# Patient Record
Sex: FEMALE | Race: WHITE | NOT HISPANIC OR LATINO | Employment: OTHER | URBAN - NONMETROPOLITAN AREA
[De-identification: names, ages, dates, MRNs, and addresses within clinical notes are randomized per-mention and may not be internally consistent; named-entity substitution may affect disease eponyms.]

---

## 2017-01-27 DIAGNOSIS — I48.0 PAROXYSMAL ATRIAL FIBRILLATION (HCC): Primary | ICD-10-CM

## 2017-01-27 DIAGNOSIS — Z51.81 ENCOUNTER FOR MONITORING FLECAINIDE THERAPY: Primary | ICD-10-CM

## 2017-01-27 DIAGNOSIS — Z79.899 ENCOUNTER FOR MONITORING FLECAINIDE THERAPY: Primary | ICD-10-CM

## 2017-01-27 RX ORDER — FLECAINIDE ACETATE 50 MG/1
50 TABLET ORAL 2 TIMES DAILY
Qty: 60 TABLET | Refills: 11 | Status: SHIPPED | OUTPATIENT
Start: 2017-01-27 | End: 2018-01-26 | Stop reason: SDUPTHER

## 2017-02-03 DIAGNOSIS — I50.9 CONGESTIVE HEART FAILURE, UNSPECIFIED CONGESTIVE HEART FAILURE CHRONICITY, UNSPECIFIED CONGESTIVE HEART FAILURE TYPE: ICD-10-CM

## 2017-02-03 DIAGNOSIS — R06.02 SHORTNESS OF BREATH: ICD-10-CM

## 2017-02-03 RX ORDER — POTASSIUM CHLORIDE 750 MG/1
10 TABLET, FILM COATED, EXTENDED RELEASE ORAL DAILY
Qty: 30 TABLET | Refills: 4 | Status: SHIPPED | OUTPATIENT
Start: 2017-02-03 | End: 2018-01-26 | Stop reason: SDUPTHER

## 2017-02-10 ENCOUNTER — OFFICE VISIT (OUTPATIENT)
Dept: CARDIOLOGY | Facility: CLINIC | Age: 77
End: 2017-02-10

## 2017-02-10 VITALS
DIASTOLIC BLOOD PRESSURE: 77 MMHG | BODY MASS INDEX: 37 KG/M2 | WEIGHT: 196 LBS | HEART RATE: 60 BPM | SYSTOLIC BLOOD PRESSURE: 156 MMHG | HEIGHT: 61 IN | OXYGEN SATURATION: 96 %

## 2017-02-10 DIAGNOSIS — I10 ESSENTIAL HYPERTENSION: ICD-10-CM

## 2017-02-10 DIAGNOSIS — R06.02 SHORTNESS OF BREATH: ICD-10-CM

## 2017-02-10 DIAGNOSIS — I48.0 PAROXYSMAL ATRIAL FIBRILLATION (HCC): Primary | ICD-10-CM

## 2017-02-10 PROCEDURE — 99213 OFFICE O/P EST LOW 20 MIN: CPT | Performed by: PHYSICIAN ASSISTANT

## 2017-02-10 RX ORDER — OMEPRAZOLE 20 MG/1
20 CAPSULE, DELAYED RELEASE ORAL 2 TIMES DAILY
COMMUNITY
Start: 2017-02-02 | End: 2017-11-22

## 2017-02-10 NOTE — PROGRESS NOTES
Problem list     Subjective   Cari Harper is a 76 y.o. female     Chief Complaint   Patient presents with   • Follow-up     routine   Problem List:  1. Paroxysmal atrial fibrillation  2. Preserved systolic function  3. Low risk stress test with no evidence of ischemia.  4. Hypertension  5. Mild pulmonary hypertension, PA systolic pressures 40-45 mmHg  6. Dyslipidemia  7. Hypothyroidism       HPI  The patient presents back in today to discuss medication change.  Because of side effects associated with amiodarone, we discontinued that therapy.  We put her on flecainide therapy and followed her with daily EKGs ×3.  She reports that she feels better after discontinuing amiodarone therapy.  She seems to have less dyspnea.  She was experiencing a tremor while on amiodarone.  That appears markedly improved as well.  Recently, she has had no rhythm disturbance.  She reports no PND or orthopnea.  Her edema even seems better long-term.  She relates to no chest pain or other anginal equivalent symptoms otherwise.  She has no further complaints.    Current Outpatient Prescriptions   Medication Sig Dispense Refill   • apixaban (ELIQUIS) 5 MG tablet tablet Take 5 mg by mouth every evening.     • clonazePAM (KlonoPIN) 1 MG tablet Take  by mouth. Takes 0.5 mg am and 1 mg in pm     • ferrous sulfate 325 (65 FE) MG tablet Take 325 mg by mouth daily with breakfast.     • flecainide (TAMBOCOR) 50 MG tablet Take 1 tablet by mouth 2 (Two) Times a Day. 60 tablet 11   • FLUoxetine (PROzac) 20 MG capsule Take 20 mg by mouth 2 (two) times a day.     • furosemide (LASIX) 20 MG tablet Take 1 tablet by mouth daily. 90 tablet 3   • Omega-3 Fatty Acids (FISH OIL) 1000 MG capsule capsule Take 1,200 mg by mouth 2 (Two) Times a Day With Meals.     • omeprazole (priLOSEC) 20 MG capsule Take 20 mg by mouth 2 (Two) Times a Day.     • potassium chloride (K-DUR) 10 MEQ CR tablet Take 1 tablet by mouth Daily. 30 tablet 4   • Prenatal Vit-Fe Fumarate-FA  (PRENATAL VITAMIN 27-0.8) 27-0.8 MG tablet tablet Take  by mouth daily.     • thyroid (ARMOUR) 120 MG tablet Take 60 mg by mouth daily.     • ondansetron (ZOFRAN) 4 MG tablet Take 1 tablet PRN nausea every 4 hours. 5 tablet 0   • polyethylene glycol (MIRALAX) packet Take 255g of Miralax with 32 oz clear liquid at 8pm the night before the procedure. Repeat 255g Miralax 6 hrs prior to your procedure. 510 g 0   • Probiotic Product (PROBIOTIC ADVANCED PO) Take 1 tablet by mouth daily.       No current facility-administered medications for this visit.        Review of patient's allergies indicates no known allergies.    Past Medical History   Diagnosis Date   • Anxiety    • Arthritis    • Atrial fibrillation    • Coronary artery disease    • Disease of thyroid gland    • DVT (deep venous thrombosis)    • Hyperlipidemia    • Hypertension        Social History     Social History   • Marital status: Unknown     Spouse name: N/A   • Number of children: N/A   • Years of education: N/A     Occupational History   • Not on file.     Social History Main Topics   • Smoking status: Former Smoker     Packs/day: 1.00     Years: 0.00     Types: Cigarettes     Start date: 8/11/1961     Quit date: 8/11/1986   • Smokeless tobacco: Never Used   • Alcohol use No   • Drug use: No   • Sexual activity: Defer     Other Topics Concern   • Not on file     Social History Narrative       Family History   Problem Relation Age of Onset   • Cancer Father    • No Known Problems Mother        Review of Systems   Constitutional: Positive for fatigue.   HENT: Positive for ear pain (left ear).    Eyes: Positive for visual disturbance (reading glasses).   Respiratory: Positive for shortness of breath.    Cardiovascular: Positive for leg swelling. Negative for chest pain and palpitations.   Gastrointestinal: Negative.    Endocrine: Negative.    Genitourinary: Negative.    Musculoskeletal: Positive for arthralgias, joint swelling (rt. shoulder due to past  "fall) and myalgias.   Skin: Negative.    Allergic/Immunologic: Negative.    Neurological: Negative.    Hematological: Bruises/bleeds easily.   Psychiatric/Behavioral: Negative.        Objective   Visit Vitals   • /77 (BP Location: Left arm, Patient Position: Sitting)   • Pulse 60   • Ht 61\" (154.9 cm)   • Wt 196 lb (88.9 kg)   • SpO2 96%   • BMI 37.03 kg/m2     Lab Results (most recent)     None        Physical Exam   Constitutional: She is oriented to person, place, and time. She appears well-developed and well-nourished. No distress.   HENT:   Head: Normocephalic and atraumatic.   Eyes: Conjunctivae and EOM are normal. Pupils are equal, round, and reactive to light.   Neck: Normal range of motion. Neck supple. No JVD present. No tracheal deviation present.   Cardiovascular: Normal rate, regular rhythm, normal heart sounds and intact distal pulses.    Pulmonary/Chest: Effort normal. She has decreased breath sounds. She has no rales (Minimal bibasilar rales).   Abdominal: Soft. Bowel sounds are normal. She exhibits no distension and no mass. There is no tenderness. There is no rebound and no guarding.   Musculoskeletal: Normal range of motion. She exhibits edema. She exhibits no tenderness or deformity.   Neurological: She is alert and oriented to person, place, and time.   Skin: Skin is warm and dry. No rash noted. No erythema. No pallor.   Psychiatric: She has a normal mood and affect. Her behavior is normal. Judgment and thought content normal.   Nursing note and vitals reviewed.        Procedure   Procedures       Assessment/Plan      Diagnosis Plan   1. Paroxysmal atrial fibrillation     2. Essential hypertension     3. Shortness of breath       The patient is doing well at this time.  She feels improved on flecainide.  The majority of side effects experienced while on amiodarone seems to be improving while off that medication.  I will make no changes at this time.  Her cardiac workup is been very much " benign at this point.  She feels very much stable this time.  She will call to us for any issues.  Otherwise we'll see her back in 3 months.

## 2017-03-07 DIAGNOSIS — R60.9 EDEMA, UNSPECIFIED TYPE: Primary | ICD-10-CM

## 2017-03-07 RX ORDER — BUMETANIDE 2 MG/1
2 TABLET ORAL DAILY
Qty: 30 TABLET | Refills: 6 | Status: SHIPPED | OUTPATIENT
Start: 2017-03-07 | End: 2017-09-28 | Stop reason: SDUPTHER

## 2017-05-12 ENCOUNTER — OFFICE VISIT (OUTPATIENT)
Dept: CARDIOLOGY | Facility: CLINIC | Age: 77
End: 2017-05-12

## 2017-05-12 VITALS
OXYGEN SATURATION: 97 % | DIASTOLIC BLOOD PRESSURE: 58 MMHG | SYSTOLIC BLOOD PRESSURE: 120 MMHG | BODY MASS INDEX: 36.4 KG/M2 | HEART RATE: 51 BPM | WEIGHT: 192.8 LBS | HEIGHT: 61 IN

## 2017-05-12 DIAGNOSIS — R06.02 SHORTNESS OF BREATH: Primary | ICD-10-CM

## 2017-05-12 DIAGNOSIS — I48.0 PAROXYSMAL ATRIAL FIBRILLATION (HCC): ICD-10-CM

## 2017-05-12 PROCEDURE — 99213 OFFICE O/P EST LOW 20 MIN: CPT | Performed by: PHYSICIAN ASSISTANT

## 2017-06-06 DIAGNOSIS — R60.9 EDEMA, UNSPECIFIED TYPE: Primary | ICD-10-CM

## 2017-06-06 RX ORDER — METOLAZONE 2.5 MG/1
2.5 TABLET ORAL DAILY
Qty: 10 TABLET | Refills: 0 | Status: SHIPPED | OUTPATIENT
Start: 2017-06-06 | End: 2017-11-22

## 2017-09-28 DIAGNOSIS — R60.9 EDEMA, UNSPECIFIED TYPE: ICD-10-CM

## 2017-09-28 RX ORDER — BUMETANIDE 2 MG/1
TABLET ORAL
Qty: 30 TABLET | Refills: 3 | Status: SHIPPED | OUTPATIENT
Start: 2017-09-28 | End: 2017-11-22

## 2017-11-17 ENCOUNTER — OFFICE VISIT (OUTPATIENT)
Dept: CARDIOLOGY | Facility: CLINIC | Age: 77
End: 2017-11-17

## 2017-11-17 VITALS
BODY MASS INDEX: 36.06 KG/M2 | OXYGEN SATURATION: 92 % | HEART RATE: 52 BPM | SYSTOLIC BLOOD PRESSURE: 135 MMHG | HEIGHT: 61 IN | WEIGHT: 191 LBS | DIASTOLIC BLOOD PRESSURE: 49 MMHG

## 2017-11-17 DIAGNOSIS — I48.0 PAROXYSMAL ATRIAL FIBRILLATION (HCC): Primary | ICD-10-CM

## 2017-11-17 DIAGNOSIS — I27.20 PULMONARY HTN (HCC): ICD-10-CM

## 2017-11-17 DIAGNOSIS — R06.02 SHORTNESS OF BREATH: ICD-10-CM

## 2017-11-17 PROCEDURE — 93000 ELECTROCARDIOGRAM COMPLETE: CPT | Performed by: PHYSICIAN ASSISTANT

## 2017-11-17 PROCEDURE — 99213 OFFICE O/P EST LOW 20 MIN: CPT | Performed by: PHYSICIAN ASSISTANT

## 2017-11-17 RX ORDER — MELOXICAM 7.5 MG/1
TABLET ORAL DAILY
COMMUNITY
Start: 2017-10-25 | End: 2017-11-22

## 2017-11-17 NOTE — PROGRESS NOTES
Problem list     Subjective   Cari Harper is a 77 y.o. female     Chief Complaint   Patient presents with   • Follow-up     patient appears in office today for routine follow up ; denies CP/palpitations   • Atrial Fibrillation     patient has H/O paroxysmal atrial fibrillation    • Shortness of Breath     patient states she has SOA upon exertion only    Problem List:  1. Paroxysmal atrial fibrillation  2. Preserved systolic function  3. Low risk stress test with no evidence of ischemia.  4. Hypertension  5. Mild pulmonary hypertension, PA systolic pressures 40-45 mmHg  6. Dyslipidemia  7. Hypothyroidism  8. First degree AVB.        HPI  The patient presents back in today for routine evaluation and follow-up.  Since last appointment here, she feels that she has done well from cardiac standpoint.  She describes no chest pain.  She has stable dyspnea.  She relates to no dysrhythmic symptoms.  The patient denies dizziness or syncope.  Blood pressures have been well-controlled.  Exercise capacity is poor but baseline.  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  By her report, she has had no bleeding issues on anticoagulation therapy.  She has no further complaints otherwise.    Current Outpatient Prescriptions   Medication Sig Dispense Refill   • apixaban (ELIQUIS) 5 MG tablet tablet Take 1 tablet by mouth Every 12 (Twelve) Hours. 42 tablet 0   • bumetanide (BUMEX) 2 MG tablet TAKE 1 TABLET BY MOUTH DAILY EVERY MORNING FOR FLUID RETENTION &SWELLLING 30 tablet 3   • clonazePAM (KlonoPIN) 1 MG tablet Take  by mouth. Takes 0.5 mg am and 1 mg in pm     • ferrous sulfate 325 (65 FE) MG tablet Take 325 mg by mouth daily with breakfast.     • flecainide (TAMBOCOR) 50 MG tablet Take 1 tablet by mouth 2 (Two) Times a Day. 60 tablet 11   • FLUoxetine (PROzac) 20 MG capsule Take 20 mg by mouth 2 (two) times a day.     • meloxicam (MOBIC) 7.5 MG tablet Daily.     • metOLazone  (ZAROXOLYN) 2.5 MG tablet Take 1 tablet by mouth Daily. 30 minutes prior to Lasix for 2 days. 10 tablet 0   • Omega-3 Fatty Acids (FISH OIL) 1000 MG capsule capsule Take 1,200 mg by mouth 2 (Two) Times a Day With Meals.     • omeprazole (priLOSEC) 20 MG capsule Take 20 mg by mouth 2 (Two) Times a Day.     • ondansetron (ZOFRAN) 4 MG tablet Take 1 tablet PRN nausea every 4 hours. 5 tablet 0   • polyethylene glycol (MIRALAX) packet Take 255g of Miralax with 32 oz clear liquid at 8pm the night before the procedure. Repeat 255g Miralax 6 hrs prior to your procedure. 510 g 0   • potassium chloride (K-DUR) 10 MEQ CR tablet Take 1 tablet by mouth Daily. 30 tablet 4   • Prenatal Vit-Fe Fumarate-FA (PRENATAL VITAMIN 27-0.8) 27-0.8 MG tablet tablet Take  by mouth daily.     • Probiotic Product (PROBIOTIC ADVANCED PO) Take 1 tablet by mouth daily.     • thyroid (ARMOUR) 120 MG tablet Take 60 mg by mouth daily.       No current facility-administered medications for this visit.        Review of patient's allergies indicates no known allergies.    Past Medical History:   Diagnosis Date   • Anxiety    • Arthritis    • Atrial fibrillation    • Coronary artery disease    • Disease of thyroid gland    • DVT (deep venous thrombosis)    • Hyperlipidemia    • Hypertension        Social History     Social History   • Marital status: Unknown     Spouse name: N/A   • Number of children: N/A   • Years of education: N/A     Occupational History   • Not on file.     Social History Main Topics   • Smoking status: Former Smoker     Packs/day: 1.00     Years: 0.00     Types: Cigarettes     Start date: 8/11/1961     Quit date: 8/11/1986   • Smokeless tobacco: Never Used   • Alcohol use No   • Drug use: No   • Sexual activity: Defer     Other Topics Concern   • Not on file     Social History Narrative       Family History   Problem Relation Age of Onset   • Cancer Father    • No Known Problems Mother        Review of Systems   Constitutional:  "Negative.  Negative for fatigue.   HENT: Positive for congestion (head congestion ) and rhinorrhea (due to seasonal allergies). Negative for sneezing and sore throat.    Eyes: Positive for visual disturbance (wears reading glasses).   Respiratory: Positive for cough (due to allergies) and shortness of breath (on exertion only). Negative for apnea, chest tightness and wheezing.    Cardiovascular: Positive for leg swelling (occasional BLE swelling/edema). Negative for chest pain and palpitations.   Gastrointestinal: Negative.  Negative for abdominal distention, abdominal pain, nausea and vomiting.   Endocrine: Negative.  Negative for cold intolerance, heat intolerance, polyphagia and polyuria.   Genitourinary: Negative.  Negative for difficulty urinating, frequency and urgency.   Musculoskeletal: Positive for arthralgias (back ) and back pain (due to arthritis). Negative for neck pain and neck stiffness.   Skin: Negative.  Negative for rash and wound.   Allergic/Immunologic: Positive for environmental allergies (seasonal allergies) and food allergies (salt).   Neurological: Negative.  Negative for dizziness, weakness, light-headedness and headaches.   Hematological: Bruises/bleeds easily (bruises and bleeds easily).   Psychiatric/Behavioral: Negative for agitation, confusion and sleep disturbance (denies waking up smothering/SOA). The patient is not nervous/anxious.        Objective   /49 (BP Location: Left arm, Patient Position: Sitting)  Pulse 52  Ht 61\" (154.9 cm)  Wt 191 lb (86.6 kg)  SpO2 92%  BMI 36.09 kg/m2  Lab Results (most recent)     None        Physical Exam   Constitutional: She is oriented to person, place, and time. She appears well-developed and well-nourished. No distress.   HENT:   Head: Normocephalic and atraumatic.   Eyes: Conjunctivae and EOM are normal. Pupils are equal, round, and reactive to light.   Neck: Normal range of motion. Neck supple. No JVD present. No tracheal deviation " present.   Cardiovascular: Normal rate, regular rhythm, normal heart sounds and intact distal pulses.    Pulmonary/Chest: Effort normal. She has decreased breath sounds. She has no rales (Minimal bibasilar rales).   Abdominal: Soft. Bowel sounds are normal. She exhibits no distension and no mass. There is no tenderness. There is no rebound and no guarding.   Musculoskeletal: Normal range of motion. She exhibits edema. She exhibits no tenderness or deformity.   Neurological: She is alert and oriented to person, place, and time.   Skin: Skin is warm and dry. No rash noted. No erythema. No pallor.   Psychiatric: She has a normal mood and affect. Her behavior is normal. Judgment and thought content normal.   Nursing note and vitals reviewed.        Procedure     ECG 12 Lead  Date/Time: 11/17/2017 11:40 AM  Performed by: DANIS YO  Authorized by: DANIS YO   Comments: Atrial fibrillation     Sinus rhythm, probable normal axis, septal wall MI age indeterminate, first-degree AV block, no acute changes noted.               Assessment/Plan      Diagnosis Plan   1. Paroxysmal atrial fibrillation  ECG 12 Lead   2. Shortness of breath     3. Pulmonary HTN       The patient is stable.  I will make no changes at this time.  For any issues, she will call to us.  We did give her samples of Eliquis.  We will see her back in 6 months, sooner if indicated by course.

## 2017-11-21 ENCOUNTER — APPOINTMENT (OUTPATIENT)
Dept: GENERAL RADIOLOGY | Facility: HOSPITAL | Age: 77
End: 2017-11-21

## 2017-11-21 ENCOUNTER — HOSPITAL ENCOUNTER (INPATIENT)
Facility: HOSPITAL | Age: 77
LOS: 7 days | Discharge: HOME | End: 2017-11-29
Attending: FAMILY MEDICINE | Admitting: INTERNAL MEDICINE

## 2017-11-21 DIAGNOSIS — J12.9 VIRAL PNEUMONIA: Primary | ICD-10-CM

## 2017-11-21 DIAGNOSIS — I48.20 CHRONIC ATRIAL FIBRILLATION (HCC): ICD-10-CM

## 2017-11-21 DIAGNOSIS — J96.01 ACUTE RESPIRATORY FAILURE WITH HYPOXIA (HCC): ICD-10-CM

## 2017-11-21 PROBLEM — J96.00 RESPIRATORY FAILURE, ACUTE (HCC): Status: ACTIVE | Noted: 2017-11-21

## 2017-11-21 LAB
A-A DO2: 58.2 MMHG (ref 0–300)
ALBUMIN SERPL-MCNC: 4.1 G/DL (ref 3.4–4.8)
ALBUMIN/GLOB SERPL: 1.2 G/DL (ref 1.5–2.5)
ALP SERPL-CCNC: 175 U/L (ref 35–104)
ALT SERPL W P-5'-P-CCNC: 21 U/L (ref 10–36)
ANION GAP SERPL CALCULATED.3IONS-SCNC: 8.1 MMOL/L (ref 3.6–11.2)
APTT PPP: 45.1 SECONDS (ref 23.8–36.1)
ARTERIAL PATENCY WRIST A: POSITIVE
AST SERPL-CCNC: 29 U/L (ref 10–30)
ATMOSPHERIC PRESS: 726 MMHG
BASE EXCESS BLDA CALC-SCNC: 3.2 MMOL/L
BASOPHILS # BLD AUTO: 0.02 10*3/MM3 (ref 0–0.3)
BASOPHILS NFR BLD AUTO: 0.2 % (ref 0–2)
BDY SITE: ABNORMAL
BILIRUB SERPL-MCNC: 0.8 MG/DL (ref 0.2–1.8)
BNP SERPL-MCNC: 771 PG/ML (ref 0–100)
BODY TEMPERATURE: 98.6 C
BUN BLD-MCNC: 23 MG/DL (ref 7–21)
BUN/CREAT SERPL: 19.2 (ref 7–25)
CALCIUM SPEC-SCNC: 9.2 MG/DL (ref 7.7–10)
CHLORIDE SERPL-SCNC: 101 MMOL/L (ref 99–112)
CO2 SERPL-SCNC: 27.9 MMOL/L (ref 24.3–31.9)
COHGB MFR BLD: 1.8 % (ref 0–5)
CREAT BLD-MCNC: 1.2 MG/DL (ref 0.43–1.29)
CRP SERPL-MCNC: 12.4 MG/DL (ref 0–0.99)
D-LACTATE SERPL-SCNC: 0.9 MMOL/L (ref 0.5–2)
DEPRECATED RDW RBC AUTO: 45.1 FL (ref 37–54)
EOSINOPHIL # BLD AUTO: 0.11 10*3/MM3 (ref 0–0.7)
EOSINOPHIL NFR BLD AUTO: 1.2 % (ref 0–7)
ERYTHROCYTE [DISTWIDTH] IN BLOOD BY AUTOMATED COUNT: 14.9 % (ref 11.5–14.5)
ERYTHROCYTE [SEDIMENTATION RATE] IN BLOOD: 81 MM/HR (ref 0–30)
FLUAV AG NPH QL: NEGATIVE
FLUBV AG NPH QL IA: NEGATIVE
GFR SERPL CREATININE-BSD FRML MDRD: 43 ML/MIN/1.73
GLOBULIN UR ELPH-MCNC: 3.3 GM/DL
GLUCOSE BLD-MCNC: 118 MG/DL (ref 70–110)
HCO3 BLDA-SCNC: 25.8 MMOL/L (ref 22–26)
HCT VFR BLD AUTO: 33.4 % (ref 37–47)
HCT VFR BLD CALC: 35 % (ref 37–47)
HGB BLD-MCNC: 10.5 G/DL (ref 12–16)
HGB BLDA-MCNC: 11.8 G/DL (ref 12–16)
HOROWITZ INDEX BLD+IHG-RTO: 21 %
IMM GRANULOCYTES # BLD: 0.02 10*3/MM3 (ref 0–0.03)
IMM GRANULOCYTES NFR BLD: 0.2 % (ref 0–0.5)
INR PPP: 1.43 (ref 0.9–1.1)
LYMPHOCYTES # BLD AUTO: 0.6 10*3/MM3 (ref 1–3)
LYMPHOCYTES NFR BLD AUTO: 6.6 % (ref 16–46)
MCH RBC QN AUTO: 26.3 PG (ref 27–33)
MCHC RBC AUTO-ENTMCNC: 31.4 G/DL (ref 33–37)
MCV RBC AUTO: 83.7 FL (ref 80–94)
METHGB BLD QL: 0.3 % (ref 0–3)
MODALITY: ABNORMAL
MONOCYTES # BLD AUTO: 0.69 10*3/MM3 (ref 0.1–0.9)
MONOCYTES NFR BLD AUTO: 7.6 % (ref 0–12)
NEUTROPHILS # BLD AUTO: 7.68 10*3/MM3 (ref 1.4–6.5)
NEUTROPHILS NFR BLD AUTO: 84.2 % (ref 40–75)
OSMOLALITY SERPL CALC.SUM OF ELEC: 278.6 MOSM/KG (ref 273–305)
OXYHGB MFR BLDV: 81.9 % (ref 85–100)
PCO2 BLDA: 32.6 MM HG (ref 35–45)
PH BLDA: 7.52 PH UNITS (ref 7.35–7.45)
PLATELET # BLD AUTO: 267 10*3/MM3 (ref 130–400)
PMV BLD AUTO: 10.9 FL (ref 6–10)
PO2 BLDA: 45.4 MM HG (ref 80–100)
POTASSIUM BLD-SCNC: 3.5 MMOL/L (ref 3.5–5.3)
PROT SERPL-MCNC: 7.4 G/DL (ref 6–8)
PROTHROMBIN TIME: 17.7 SECONDS (ref 11–15.4)
RBC # BLD AUTO: 3.99 10*6/MM3 (ref 4.2–5.4)
SAO2 % BLDCOA: 83.7 % (ref 90–100)
SODIUM BLD-SCNC: 137 MMOL/L (ref 135–153)
TROPONIN I SERPL-MCNC: 0.02 NG/ML
WBC NRBC COR # BLD: 9.12 10*3/MM3 (ref 4.5–12.5)

## 2017-11-21 PROCEDURE — 25010000002 CEFTRIAXONE: Performed by: FAMILY MEDICINE

## 2017-11-21 PROCEDURE — 25010000002 AZITHROMYCIN: Performed by: FAMILY MEDICINE

## 2017-11-21 PROCEDURE — 94799 UNLISTED PULMONARY SVC/PX: CPT

## 2017-11-21 PROCEDURE — 83605 ASSAY OF LACTIC ACID: CPT | Performed by: FAMILY MEDICINE

## 2017-11-21 PROCEDURE — 80053 COMPREHEN METABOLIC PANEL: CPT | Performed by: FAMILY MEDICINE

## 2017-11-21 PROCEDURE — 99284 EMERGENCY DEPT VISIT MOD MDM: CPT

## 2017-11-21 PROCEDURE — 85025 COMPLETE CBC W/AUTO DIFF WBC: CPT | Performed by: FAMILY MEDICINE

## 2017-11-21 PROCEDURE — 84484 ASSAY OF TROPONIN QUANT: CPT | Performed by: FAMILY MEDICINE

## 2017-11-21 PROCEDURE — 83880 ASSAY OF NATRIURETIC PEPTIDE: CPT | Performed by: FAMILY MEDICINE

## 2017-11-21 PROCEDURE — 85610 PROTHROMBIN TIME: CPT | Performed by: FAMILY MEDICINE

## 2017-11-21 PROCEDURE — 86140 C-REACTIVE PROTEIN: CPT | Performed by: FAMILY MEDICINE

## 2017-11-21 PROCEDURE — 82805 BLOOD GASES W/O2 SATURATION: CPT | Performed by: FAMILY MEDICINE

## 2017-11-21 PROCEDURE — 25010000002 METHYLPREDNISOLONE PER 125 MG: Performed by: FAMILY MEDICINE

## 2017-11-21 PROCEDURE — 71010 XR CHEST 1 VW: CPT | Performed by: RADIOLOGY

## 2017-11-21 PROCEDURE — 36600 WITHDRAWAL OF ARTERIAL BLOOD: CPT | Performed by: FAMILY MEDICINE

## 2017-11-21 PROCEDURE — 85730 THROMBOPLASTIN TIME PARTIAL: CPT | Performed by: FAMILY MEDICINE

## 2017-11-21 PROCEDURE — 83050 HGB METHEMOGLOBIN QUAN: CPT | Performed by: FAMILY MEDICINE

## 2017-11-21 PROCEDURE — 93005 ELECTROCARDIOGRAM TRACING: CPT | Performed by: FAMILY MEDICINE

## 2017-11-21 PROCEDURE — 85652 RBC SED RATE AUTOMATED: CPT | Performed by: FAMILY MEDICINE

## 2017-11-21 PROCEDURE — 94640 AIRWAY INHALATION TREATMENT: CPT

## 2017-11-21 PROCEDURE — 87040 BLOOD CULTURE FOR BACTERIA: CPT | Performed by: FAMILY MEDICINE

## 2017-11-21 PROCEDURE — 82375 ASSAY CARBOXYHB QUANT: CPT | Performed by: FAMILY MEDICINE

## 2017-11-21 PROCEDURE — 71010 HC CHEST PA OR AP: CPT

## 2017-11-21 PROCEDURE — 87804 INFLUENZA ASSAY W/OPTIC: CPT | Performed by: FAMILY MEDICINE

## 2017-11-21 PROCEDURE — 93010 ELECTROCARDIOGRAM REPORT: CPT | Performed by: INTERNAL MEDICINE

## 2017-11-21 RX ORDER — METHYLPREDNISOLONE SODIUM SUCCINATE 125 MG/2ML
80 INJECTION, POWDER, LYOPHILIZED, FOR SOLUTION INTRAMUSCULAR; INTRAVENOUS ONCE
Status: COMPLETED | OUTPATIENT
Start: 2017-11-21 | End: 2017-11-21

## 2017-11-21 RX ORDER — OXYMETAZOLINE HYDROCHLORIDE 0.05 G/100ML
3 SPRAY NASAL ONCE
Status: COMPLETED | OUTPATIENT
Start: 2017-11-21 | End: 2017-11-21

## 2017-11-21 RX ORDER — IPRATROPIUM BROMIDE AND ALBUTEROL SULFATE 2.5; .5 MG/3ML; MG/3ML
3 SOLUTION RESPIRATORY (INHALATION) ONCE
Status: COMPLETED | OUTPATIENT
Start: 2017-11-21 | End: 2017-11-21

## 2017-11-21 RX ORDER — SODIUM CHLORIDE 0.9 % (FLUSH) 0.9 %
10 SYRINGE (ML) INJECTION AS NEEDED
Status: DISCONTINUED | OUTPATIENT
Start: 2017-11-21 | End: 2017-11-29 | Stop reason: HOSPADM

## 2017-11-21 RX ORDER — ACETAMINOPHEN 500 MG
1000 TABLET ORAL ONCE
Status: COMPLETED | OUTPATIENT
Start: 2017-11-21 | End: 2017-11-21

## 2017-11-21 RX ORDER — IBUPROFEN 400 MG/1
800 TABLET ORAL ONCE
Status: COMPLETED | OUTPATIENT
Start: 2017-11-21 | End: 2017-11-21

## 2017-11-21 RX ADMIN — IPRATROPIUM BROMIDE AND ALBUTEROL SULFATE 3 ML: .5; 3 SOLUTION RESPIRATORY (INHALATION) at 22:11

## 2017-11-21 RX ADMIN — AZITHROMYCIN 500 MG: 500 INJECTION, POWDER, LYOPHILIZED, FOR SOLUTION INTRAVENOUS at 22:43

## 2017-11-21 RX ADMIN — IPRATROPIUM BROMIDE AND ALBUTEROL SULFATE 3 ML: .5; 3 SOLUTION RESPIRATORY (INHALATION) at 23:23

## 2017-11-21 RX ADMIN — CEFTRIAXONE 1 G: 1 INJECTION, POWDER, FOR SOLUTION INTRAMUSCULAR; INTRAVENOUS at 23:08

## 2017-11-21 RX ADMIN — OXYMETAZOLINE HYDROCHLORIDE 3 SPRAY: 5 SPRAY NASAL at 23:09

## 2017-11-21 RX ADMIN — METHYLPREDNISOLONE SODIUM SUCCINATE 80 MG: 125 INJECTION, POWDER, FOR SOLUTION INTRAMUSCULAR; INTRAVENOUS at 22:23

## 2017-11-21 RX ADMIN — IPRATROPIUM BROMIDE AND ALBUTEROL SULFATE 3 ML: .5; 3 SOLUTION RESPIRATORY (INHALATION) at 22:52

## 2017-11-21 RX ADMIN — ACETAMINOPHEN 1000 MG: 500 TABLET ORAL at 23:21

## 2017-11-21 RX ADMIN — IBUPROFEN 800 MG: 400 TABLET, FILM COATED ORAL at 22:09

## 2017-11-22 ENCOUNTER — APPOINTMENT (OUTPATIENT)
Dept: CARDIOLOGY | Facility: HOSPITAL | Age: 77
End: 2017-11-22
Attending: INTERNAL MEDICINE

## 2017-11-22 PROBLEM — J12.9 VIRAL PNEUMONIA: Status: ACTIVE | Noted: 2017-11-22

## 2017-11-22 LAB
ALBUMIN SERPL-MCNC: 3.9 G/DL (ref 3.4–4.8)
ALBUMIN/GLOB SERPL: 1.3 G/DL (ref 1.5–2.5)
ALP SERPL-CCNC: 174 U/L (ref 35–104)
ALT SERPL W P-5'-P-CCNC: 11 U/L (ref 10–36)
ANION GAP SERPL CALCULATED.3IONS-SCNC: 12.3 MMOL/L (ref 3.6–11.2)
AST SERPL-CCNC: 23 U/L (ref 10–30)
BACTERIA UR QL AUTO: ABNORMAL /HPF
BASOPHILS # BLD AUTO: 0.01 10*3/MM3 (ref 0–0.3)
BASOPHILS NFR BLD AUTO: 0.1 % (ref 0–2)
BH CV ECHO MEAS - % IVS THICK: 22.2 %
BH CV ECHO MEAS - % LVPW THICK: 100 %
BH CV ECHO MEAS - ACS: 1.9 CM
BH CV ECHO MEAS - AO MAX PG (FULL): 9.2 MMHG
BH CV ECHO MEAS - AO MAX PG: 17.2 MMHG
BH CV ECHO MEAS - AO MEAN PG (FULL): 3.6 MMHG
BH CV ECHO MEAS - AO MEAN PG: 8 MMHG
BH CV ECHO MEAS - AO ROOT AREA (BSA CORRECTED): 1.3
BH CV ECHO MEAS - AO ROOT AREA: 5.7 CM^2
BH CV ECHO MEAS - AO ROOT DIAM: 2.7 CM
BH CV ECHO MEAS - AO V2 MAX: 207.2 CM/SEC
BH CV ECHO MEAS - AO V2 MEAN: 131 CM/SEC
BH CV ECHO MEAS - AO V2 VTI: 47.5 CM
BH CV ECHO MEAS - AVA(I,A): 2.2 CM^2
BH CV ECHO MEAS - AVA(I,D): 2.2 CM^2
BH CV ECHO MEAS - AVA(V,A): 1.8 CM^2
BH CV ECHO MEAS - AVA(V,D): 1.8 CM^2
BH CV ECHO MEAS - BSA(HAYCOCK): 2.2 M^2
BH CV ECHO MEAS - BSA: 2 M^2
BH CV ECHO MEAS - BZI_BMI: 43.3 KILOGRAMS/M^2
BH CV ECHO MEAS - BZI_METRIC_HEIGHT: 154.9 CM
BH CV ECHO MEAS - BZI_METRIC_WEIGHT: 103.9 KG
BH CV ECHO MEAS - CONTRAST EF 4CH: 68.7 ML/M^2
BH CV ECHO MEAS - EDV(CUBED): 140.8 ML
BH CV ECHO MEAS - EDV(MOD-SP4): 67 ML
BH CV ECHO MEAS - EDV(TEICH): 129.7 ML
BH CV ECHO MEAS - EF(CUBED): 86 %
BH CV ECHO MEAS - EF(TEICH): 79.1 %
BH CV ECHO MEAS - ESV(CUBED): 19.7 ML
BH CV ECHO MEAS - ESV(MOD-SP4): 21 ML
BH CV ECHO MEAS - ESV(TEICH): 27.1 ML
BH CV ECHO MEAS - FS: 48.1 %
BH CV ECHO MEAS - IVS/LVPW: 0.95
BH CV ECHO MEAS - IVSD: 0.73 CM
BH CV ECHO MEAS - IVSS: 0.89 CM
BH CV ECHO MEAS - LA DIMENSION: 3.7 CM
BH CV ECHO MEAS - LA/AO: 1.4
BH CV ECHO MEAS - LV DIASTOLIC VOL/BSA (35-75): 33.5 ML/M^2
BH CV ECHO MEAS - LV MASS(C)D: 133.1 GRAMS
BH CV ECHO MEAS - LV MASS(C)DI: 66.5 GRAMS/M^2
BH CV ECHO MEAS - LV MASS(C)S: 96 GRAMS
BH CV ECHO MEAS - LV MASS(C)SI: 48 GRAMS/M^2
BH CV ECHO MEAS - LV MAX PG: 8 MMHG
BH CV ECHO MEAS - LV MEAN PG: 4.5 MMHG
BH CV ECHO MEAS - LV SYSTOLIC VOL/BSA (12-30): 10.5 ML/M^2
BH CV ECHO MEAS - LV V1 MAX: 141.2 CM/SEC
BH CV ECHO MEAS - LV V1 MEAN: 96.1 CM/SEC
BH CV ECHO MEAS - LV V1 VTI: 38.9 CM
BH CV ECHO MEAS - LVIDD: 5.2 CM
BH CV ECHO MEAS - LVIDS: 2.7 CM
BH CV ECHO MEAS - LVLD AP4: 7.9 CM
BH CV ECHO MEAS - LVLS AP4: 5.9 CM
BH CV ECHO MEAS - LVOT AREA (M): 2.5 CM^2
BH CV ECHO MEAS - LVOT AREA: 2.7 CM^2
BH CV ECHO MEAS - LVOT DIAM: 1.8 CM
BH CV ECHO MEAS - LVPWD: 0.77 CM
BH CV ECHO MEAS - LVPWS: 1.5 CM
BH CV ECHO MEAS - MV A MAX VEL: 126.2 CM/SEC
BH CV ECHO MEAS - MV E MAX VEL: 166.6 CM/SEC
BH CV ECHO MEAS - MV E/A: 1.3
BH CV ECHO MEAS - PA ACC SLOPE: 1799 CM/SEC^2
BH CV ECHO MEAS - PA ACC TIME: 0.08 SEC
BH CV ECHO MEAS - PA PR(ACCEL): 41 MMHG
BH CV ECHO MEAS - RAP SYSTOLE: 10 MMHG
BH CV ECHO MEAS - RVDD: 2.9 CM
BH CV ECHO MEAS - RVSP: 61.8 MMHG
BH CV ECHO MEAS - SI(AO): 135 ML/M^2
BH CV ECHO MEAS - SI(CUBED): 60.5 ML/M^2
BH CV ECHO MEAS - SI(LVOT): 51.9 ML/M^2
BH CV ECHO MEAS - SI(MOD-SP4): 23 ML/M^2
BH CV ECHO MEAS - SI(TEICH): 51.3 ML/M^2
BH CV ECHO MEAS - SV(AO): 270.2 ML
BH CV ECHO MEAS - SV(CUBED): 121.1 ML
BH CV ECHO MEAS - SV(LVOT): 103.9 ML
BH CV ECHO MEAS - SV(MOD-SP4): 46 ML
BH CV ECHO MEAS - SV(TEICH): 102.6 ML
BH CV ECHO MEAS - TR MAX VEL: 359.7 CM/SEC
BILIRUB SERPL-MCNC: 0.7 MG/DL (ref 0.2–1.8)
BILIRUB UR QL STRIP: NEGATIVE
BUN BLD-MCNC: 21 MG/DL (ref 7–21)
BUN/CREAT SERPL: 17.9 (ref 7–25)
CALCIUM SPEC-SCNC: 8.4 MG/DL (ref 7.7–10)
CHLORIDE SERPL-SCNC: 105 MMOL/L (ref 99–112)
CLARITY UR: CLEAR
CO2 SERPL-SCNC: 21.7 MMOL/L (ref 24.3–31.9)
COLOR UR: YELLOW
CREAT BLD-MCNC: 1.17 MG/DL (ref 0.43–1.29)
CRP SERPL-MCNC: 12.45 MG/DL (ref 0–0.99)
DEPRECATED RDW RBC AUTO: 45.6 FL (ref 37–54)
EOSINOPHIL # BLD AUTO: 0 10*3/MM3 (ref 0–0.7)
EOSINOPHIL NFR BLD AUTO: 0 % (ref 0–7)
ERYTHROCYTE [DISTWIDTH] IN BLOOD BY AUTOMATED COUNT: 14.9 % (ref 11.5–14.5)
GFR SERPL CREATININE-BSD FRML MDRD: 45 ML/MIN/1.73
GLOBULIN UR ELPH-MCNC: 2.9 GM/DL
GLUCOSE BLD-MCNC: 166 MG/DL (ref 70–110)
GLUCOSE UR STRIP-MCNC: NEGATIVE MG/DL
HCT VFR BLD AUTO: 31.7 % (ref 37–47)
HGB BLD-MCNC: 9.7 G/DL (ref 12–16)
HGB UR QL STRIP.AUTO: NEGATIVE
HYALINE CASTS UR QL AUTO: ABNORMAL /LPF
IMM GRANULOCYTES # BLD: 0.03 10*3/MM3 (ref 0–0.03)
IMM GRANULOCYTES NFR BLD: 0.3 % (ref 0–0.5)
KETONES UR QL STRIP: NEGATIVE
L PNEUMO1 AG UR QL IA: NEGATIVE
LEUKOCYTE ESTERASE UR QL STRIP.AUTO: ABNORMAL
LYMPHOCYTES # BLD AUTO: 0.19 10*3/MM3 (ref 1–3)
LYMPHOCYTES NFR BLD AUTO: 2.1 % (ref 16–46)
M PNEUMO IGM SER QL: NEGATIVE
MAXIMAL PREDICTED HEART RATE: 142 BPM
MCH RBC QN AUTO: 26.2 PG (ref 27–33)
MCHC RBC AUTO-ENTMCNC: 30.6 G/DL (ref 33–37)
MCV RBC AUTO: 85.7 FL (ref 80–94)
MONOCYTES # BLD AUTO: 0.09 10*3/MM3 (ref 0.1–0.9)
MONOCYTES NFR BLD AUTO: 1 % (ref 0–12)
NEUTROPHILS # BLD AUTO: 8.76 10*3/MM3 (ref 1.4–6.5)
NEUTROPHILS NFR BLD AUTO: 96.5 % (ref 40–75)
NITRITE UR QL STRIP: NEGATIVE
OSMOLALITY SERPL CALC.SUM OF ELEC: 284.3 MOSM/KG (ref 273–305)
PH UR STRIP.AUTO: 6.5 [PH] (ref 5–8)
PLATELET # BLD AUTO: 242 10*3/MM3 (ref 130–400)
PMV BLD AUTO: 11 FL (ref 6–10)
POTASSIUM BLD-SCNC: 3.2 MMOL/L (ref 3.5–5.3)
PROT SERPL-MCNC: 6.8 G/DL (ref 6–8)
PROT UR QL STRIP: NEGATIVE
RBC # BLD AUTO: 3.7 10*6/MM3 (ref 4.2–5.4)
RBC # UR: ABNORMAL /HPF
REF LAB TEST METHOD: ABNORMAL
SODIUM BLD-SCNC: 139 MMOL/L (ref 135–153)
SP GR UR STRIP: 1.01 (ref 1–1.03)
SQUAMOUS #/AREA URNS HPF: ABNORMAL /HPF
STRESS TARGET HR: 121 BPM
TROPONIN I SERPL-MCNC: 0.02 NG/ML
TSH SERPL DL<=0.05 MIU/L-ACNC: 0.85 MIU/ML (ref 0.55–4.78)
UROBILINOGEN UR QL STRIP: ABNORMAL
WBC NRBC COR # BLD: 9.08 10*3/MM3 (ref 4.5–12.5)
WBC UR QL AUTO: ABNORMAL /HPF

## 2017-11-22 PROCEDURE — 97530 THERAPEUTIC ACTIVITIES: CPT

## 2017-11-22 PROCEDURE — 97162 PT EVAL MOD COMPLEX 30 MIN: CPT

## 2017-11-22 PROCEDURE — 94799 UNLISTED PULMONARY SVC/PX: CPT

## 2017-11-22 PROCEDURE — 85025 COMPLETE CBC W/AUTO DIFF WBC: CPT | Performed by: INTERNAL MEDICINE

## 2017-11-22 PROCEDURE — 99223 1ST HOSP IP/OBS HIGH 75: CPT | Performed by: INTERNAL MEDICINE

## 2017-11-22 PROCEDURE — G8979 MOBILITY GOAL STATUS: HCPCS

## 2017-11-22 PROCEDURE — 93306 TTE W/DOPPLER COMPLETE: CPT

## 2017-11-22 PROCEDURE — 84484 ASSAY OF TROPONIN QUANT: CPT | Performed by: FAMILY MEDICINE

## 2017-11-22 PROCEDURE — 25010000002 CEFTRIAXONE: Performed by: INTERNAL MEDICINE

## 2017-11-22 PROCEDURE — 80053 COMPREHEN METABOLIC PANEL: CPT | Performed by: INTERNAL MEDICINE

## 2017-11-22 PROCEDURE — 84443 ASSAY THYROID STIM HORMONE: CPT | Performed by: INTERNAL MEDICINE

## 2017-11-22 PROCEDURE — 97116 GAIT TRAINING THERAPY: CPT

## 2017-11-22 PROCEDURE — G8978 MOBILITY CURRENT STATUS: HCPCS

## 2017-11-22 PROCEDURE — 87899 AGENT NOS ASSAY W/OPTIC: CPT | Performed by: INTERNAL MEDICINE

## 2017-11-22 PROCEDURE — 86140 C-REACTIVE PROTEIN: CPT | Performed by: INTERNAL MEDICINE

## 2017-11-22 PROCEDURE — 25010000002 METHYLPREDNISOLONE PER 125 MG

## 2017-11-22 PROCEDURE — 81001 URINALYSIS AUTO W/SCOPE: CPT | Performed by: FAMILY MEDICINE

## 2017-11-22 PROCEDURE — 25010000002 METHYLPREDNISOLONE PER 40 MG: Performed by: INTERNAL MEDICINE

## 2017-11-22 RX ORDER — CLONAZEPAM 0.5 MG/1
0.5 TABLET ORAL NIGHTLY
Status: DISCONTINUED | OUTPATIENT
Start: 2017-11-22 | End: 2017-11-29 | Stop reason: HOSPADM

## 2017-11-22 RX ORDER — CLONAZEPAM 1 MG/1
1 TABLET ORAL NIGHTLY
COMMUNITY

## 2017-11-22 RX ORDER — FLUOXETINE HYDROCHLORIDE 20 MG/1
20 CAPSULE ORAL 2 TIMES DAILY
Status: CANCELLED | OUTPATIENT
Start: 2017-11-22

## 2017-11-22 RX ORDER — METHYLPREDNISOLONE SODIUM SUCCINATE 40 MG/ML
40 INJECTION, POWDER, LYOPHILIZED, FOR SOLUTION INTRAMUSCULAR; INTRAVENOUS EVERY 12 HOURS
Status: DISCONTINUED | OUTPATIENT
Start: 2017-11-22 | End: 2017-11-23

## 2017-11-22 RX ORDER — SODIUM CHLORIDE 0.9 % (FLUSH) 0.9 %
1-10 SYRINGE (ML) INJECTION AS NEEDED
Status: DISCONTINUED | OUTPATIENT
Start: 2017-11-22 | End: 2017-11-29 | Stop reason: HOSPADM

## 2017-11-22 RX ORDER — FLECAINIDE ACETATE 50 MG/1
50 TABLET ORAL EVERY 12 HOURS SCHEDULED
Status: DISCONTINUED | OUTPATIENT
Start: 2017-11-22 | End: 2017-11-29 | Stop reason: HOSPADM

## 2017-11-22 RX ORDER — FERROUS SULFATE 325(65) MG
325 TABLET ORAL EVERY EVENING
Status: CANCELLED | OUTPATIENT
Start: 2017-11-22

## 2017-11-22 RX ORDER — AMOXICILLIN 500 MG
2400 CAPSULE ORAL EVERY EVENING
COMMUNITY

## 2017-11-22 RX ORDER — CLONAZEPAM 0.5 MG/1
0.5 TABLET ORAL EVERY MORNING
Status: CANCELLED | OUTPATIENT
Start: 2017-11-22

## 2017-11-22 RX ORDER — FLECAINIDE ACETATE 50 MG/1
50 TABLET ORAL 2 TIMES DAILY
Status: CANCELLED | OUTPATIENT
Start: 2017-11-22

## 2017-11-22 RX ORDER — CLONAZEPAM 1 MG/1
1 TABLET ORAL NIGHTLY
Status: CANCELLED | OUTPATIENT
Start: 2017-11-22

## 2017-11-22 RX ORDER — BUMETANIDE 1 MG/1
2 TABLET ORAL EVERY MORNING
Status: CANCELLED | OUTPATIENT
Start: 2017-11-22

## 2017-11-22 RX ORDER — METHYLPREDNISOLONE SODIUM SUCCINATE 125 MG/2ML
INJECTION, POWDER, LYOPHILIZED, FOR SOLUTION INTRAMUSCULAR; INTRAVENOUS
Status: COMPLETED
Start: 2017-11-22 | End: 2017-11-22

## 2017-11-22 RX ORDER — CLONAZEPAM 0.5 MG/1
0.5 TABLET ORAL DAILY PRN
Status: DISCONTINUED | OUTPATIENT
Start: 2017-11-22 | End: 2017-11-29 | Stop reason: HOSPADM

## 2017-11-22 RX ORDER — PRENATAL VIT/IRON FUM/FOLIC AC 27MG-0.8MG
1 TABLET ORAL EVERY EVENING
Status: CANCELLED | OUTPATIENT
Start: 2017-11-22

## 2017-11-22 RX ORDER — BUMETANIDE 2 MG/1
2 TABLET ORAL EVERY MORNING
COMMUNITY
End: 2018-01-26 | Stop reason: SDUPTHER

## 2017-11-22 RX ORDER — HEPARIN SODIUM 5000 [USP'U]/ML
5000 INJECTION, SOLUTION INTRAVENOUS; SUBCUTANEOUS EVERY 12 HOURS SCHEDULED
Status: DISCONTINUED | OUTPATIENT
Start: 2017-11-22 | End: 2017-11-22

## 2017-11-22 RX ORDER — IPRATROPIUM BROMIDE AND ALBUTEROL SULFATE 2.5; .5 MG/3ML; MG/3ML
3 SOLUTION RESPIRATORY (INHALATION)
Status: DISCONTINUED | OUTPATIENT
Start: 2017-11-22 | End: 2017-11-23

## 2017-11-22 RX ORDER — POTASSIUM CHLORIDE 750 MG/1
10 TABLET, FILM COATED, EXTENDED RELEASE ORAL EVERY MORNING
Status: CANCELLED | OUTPATIENT
Start: 2017-11-22

## 2017-11-22 RX ORDER — LEVOTHYROXINE AND LIOTHYRONINE 38; 9 UG/1; UG/1
60 TABLET ORAL EVERY MORNING
COMMUNITY

## 2017-11-22 RX ORDER — FERROUS SULFATE 325(65) MG
325 TABLET ORAL EVERY EVENING
Status: DISCONTINUED | OUTPATIENT
Start: 2017-11-22 | End: 2017-11-29 | Stop reason: HOSPADM

## 2017-11-22 RX ORDER — POTASSIUM CHLORIDE 750 MG/1
10 TABLET, FILM COATED, EXTENDED RELEASE ORAL DAILY
Status: DISCONTINUED | OUTPATIENT
Start: 2017-11-22 | End: 2017-11-29 | Stop reason: HOSPADM

## 2017-11-22 RX ORDER — LEVOTHYROXINE AND LIOTHYRONINE 38; 9 UG/1; UG/1
60 TABLET ORAL EVERY MORNING
Status: CANCELLED | OUTPATIENT
Start: 2017-11-22

## 2017-11-22 RX ORDER — BUMETANIDE 0.25 MG/ML
1 INJECTION INTRAMUSCULAR; INTRAVENOUS ONCE
Status: COMPLETED | OUTPATIENT
Start: 2017-11-22 | End: 2017-11-22

## 2017-11-22 RX ORDER — L.ACID,CASEI/B.ANIMAL/S.THERMO 16B CELL
1 CAPSULE ORAL DAILY
Status: COMPLETED | OUTPATIENT
Start: 2017-11-22 | End: 2017-11-29

## 2017-11-22 RX ORDER — CLONAZEPAM 0.5 MG/1
0.5 TABLET ORAL DAILY PRN
COMMUNITY
End: 2019-08-21

## 2017-11-22 RX ORDER — LEVOTHYROXINE AND LIOTHYRONINE 38; 9 UG/1; UG/1
60 TABLET ORAL EVERY MORNING
Status: DISCONTINUED | OUTPATIENT
Start: 2017-11-22 | End: 2017-11-29 | Stop reason: HOSPADM

## 2017-11-22 RX ORDER — FLUOXETINE HYDROCHLORIDE 20 MG/1
20 CAPSULE ORAL EVERY 12 HOURS SCHEDULED
Status: DISCONTINUED | OUTPATIENT
Start: 2017-11-22 | End: 2017-11-29 | Stop reason: HOSPADM

## 2017-11-22 RX ADMIN — THYROID, PORCINE 60 MG: 60 TABLET ORAL at 05:14

## 2017-11-22 RX ADMIN — METHYLPREDNISOLONE SODIUM SUCCINATE 40 MG: 40 INJECTION, POWDER, FOR SOLUTION INTRAMUSCULAR; INTRAVENOUS at 08:30

## 2017-11-22 RX ADMIN — FERROUS SULFATE TAB 325 MG (65 MG ELEMENTAL FE) 325 MG: 325 (65 FE) TAB at 16:44

## 2017-11-22 RX ADMIN — APIXABAN 5 MG: 5 TABLET, FILM COATED ORAL at 08:29

## 2017-11-22 RX ADMIN — DOXYCYCLINE 100 MG: 100 INJECTION, POWDER, LYOPHILIZED, FOR SOLUTION INTRAVENOUS at 05:14

## 2017-11-22 RX ADMIN — IPRATROPIUM BROMIDE AND ALBUTEROL SULFATE 3 ML: .5; 3 SOLUTION RESPIRATORY (INHALATION) at 06:27

## 2017-11-22 RX ADMIN — FLECAINIDE ACETATE 50 MG: 50 TABLET ORAL at 08:30

## 2017-11-22 RX ADMIN — SODIUM CHLORIDE 1000 ML: 9 INJECTION, SOLUTION INTRAVENOUS at 00:04

## 2017-11-22 RX ADMIN — METHYLPREDNISOLONE SODIUM SUCCINATE 40 MG: 40 INJECTION, POWDER, FOR SOLUTION INTRAMUSCULAR; INTRAVENOUS at 21:29

## 2017-11-22 RX ADMIN — FLUOXETINE HYDROCHLORIDE 20 MG: 20 CAPSULE ORAL at 08:29

## 2017-11-22 RX ADMIN — APIXABAN 5 MG: 5 TABLET, FILM COATED ORAL at 21:22

## 2017-11-22 RX ADMIN — IPRATROPIUM BROMIDE AND ALBUTEROL SULFATE 3 ML: .5; 3 SOLUTION RESPIRATORY (INHALATION) at 23:38

## 2017-11-22 RX ADMIN — THYROID, PORCINE 60 MG: 60 TABLET ORAL at 11:28

## 2017-11-22 RX ADMIN — METHYLPREDNISOLONE SODIUM SUCCINATE 40 MG: 125 INJECTION, POWDER, FOR SOLUTION INTRAMUSCULAR; INTRAVENOUS at 21:29

## 2017-11-22 RX ADMIN — IPRATROPIUM BROMIDE AND ALBUTEROL SULFATE 3 ML: .5; 3 SOLUTION RESPIRATORY (INHALATION) at 18:05

## 2017-11-22 RX ADMIN — BUMETANIDE 1 MG: 0.25 INJECTION INTRAMUSCULAR; INTRAVENOUS at 00:12

## 2017-11-22 RX ADMIN — CEFTRIAXONE 1 G: 1 INJECTION, POWDER, FOR SOLUTION INTRAMUSCULAR; INTRAVENOUS at 18:17

## 2017-11-22 RX ADMIN — CLONAZEPAM 0.5 MG: 0.5 TABLET ORAL at 21:22

## 2017-11-22 RX ADMIN — IPRATROPIUM BROMIDE AND ALBUTEROL SULFATE 3 ML: .5; 3 SOLUTION RESPIRATORY (INHALATION) at 12:00

## 2017-11-22 RX ADMIN — FLUOXETINE HYDROCHLORIDE 20 MG: 20 CAPSULE ORAL at 22:47

## 2017-11-22 RX ADMIN — Medication 1 CAPSULE: at 16:44

## 2017-11-22 RX ADMIN — DOXYCYCLINE 100 MG: 100 INJECTION, POWDER, LYOPHILIZED, FOR SOLUTION INTRAVENOUS at 16:44

## 2017-11-22 RX ADMIN — FLECAINIDE ACETATE 50 MG: 50 TABLET ORAL at 21:21

## 2017-11-22 RX ADMIN — POTASSIUM CHLORIDE 10 MEQ: 750 TABLET, FILM COATED, EXTENDED RELEASE ORAL at 08:29

## 2017-11-22 NOTE — ED PROVIDER NOTES
Subjective   History of Present Illness  78 y/o F w/h/o SOA that has been worsening for the past 24 hours. +fevers/chills. Pt denies any CP. No lower ext swelling. Pt states no h/o COPD or asthma. Pt states she quit smoking over 40 yrs ago. Pt states fevers/chills started tonight.   Review of Systems   Constitutional: Positive for chills, fatigue and fever.   Eyes: Negative for photophobia and visual disturbance.   Respiratory: Positive for cough, shortness of breath and wheezing. Negative for chest tightness.    Cardiovascular: Negative for chest pain and palpitations.   Gastrointestinal: Negative for abdominal distention, abdominal pain, constipation, diarrhea, nausea and vomiting.   Genitourinary: Negative for difficulty urinating and dysuria.   Musculoskeletal: Negative for back pain and neck pain.   Skin: Negative for color change and pallor.   Neurological: Negative for dizziness and headaches.   Hematological: Bruises/bleeds easily.       Past Medical History:   Diagnosis Date   • Anxiety    • Arthritis    • Atrial fibrillation    • Coronary artery disease    • Disease of thyroid gland    • DVT (deep venous thrombosis)    • Hyperlipidemia    • Hypertension        No Known Allergies    Past Surgical History:   Procedure Laterality Date   • APPENDECTOMY     • CHOLECYSTECTOMY     • COLONOSCOPY W/ BIOPSIES     • EYE SURGERY     • TUBAL ABDOMINAL LIGATION         Family History   Problem Relation Age of Onset   • Cancer Father    • No Known Problems Mother        Social History     Social History   • Marital status: Unknown     Spouse name: N/A   • Number of children: N/A   • Years of education: N/A     Social History Main Topics   • Smoking status: Former Smoker     Packs/day: 1.00     Years: 0.00     Types: Cigarettes     Start date: 8/11/1961     Quit date: 8/11/1986   • Smokeless tobacco: Never Used   • Alcohol use No   • Drug use: No   • Sexual activity: Defer     Other Topics Concern   • None     Social  History Narrative           Objective   Physical Exam   Constitutional: She is oriented to person, place, and time. She appears well-developed and well-nourished. She is active.   HENT:   Head: Normocephalic and atraumatic.   Right Ear: Hearing and external ear normal.   Left Ear: Hearing and external ear normal.   Nose: Nose normal.   Mouth/Throat: Uvula is midline, oropharynx is clear and moist and mucous membranes are normal.   Eyes: Conjunctivae, EOM and lids are normal. Pupils are equal, round, and reactive to light.   Neck: Trachea normal, normal range of motion, full passive range of motion without pain and phonation normal. Neck supple.   Cardiovascular: Normal rate, regular rhythm and normal heart sounds.    Pulmonary/Chest: Effort normal. She has decreased breath sounds. She has wheezes. She has no rhonchi.   Abdominal: Soft. Normal appearance.   Neurological: She is alert and oriented to person, place, and time. GCS eye subscore is 4. GCS verbal subscore is 5. GCS motor subscore is 6.   Skin: Skin is warm, dry and intact.   Psychiatric: She has a normal mood and affect. Her speech is normal and behavior is normal. Cognition and memory are normal.   Nursing note and vitals reviewed.      Procedures         ED Course  ED Course   Value Comment By Time   ECG 12 Lead Junctional rhythm. 66 bpm. QTc 436ms. +artifact. No ST segment abnormalities concerning for STEMI. Edi Hansen MD 11/21 2214      Pt desat to <90% when on RA. Pt admitted overnight for observation. Pt x-ray sig for perihilar infiltrate. Pt reports h/o CHF but is noncompliant with bumex.            MDM  Number of Diagnoses or Management Options  Chronic atrial fibrillation: new and requires workup  Viral pneumonia: new and requires workup     Amount and/or Complexity of Data Reviewed  Clinical lab tests: reviewed and ordered  Tests in the radiology section of CPT®: reviewed and ordered  Tests in the medicine section of CPT®: reviewed  and ordered  Discuss the patient with other providers: yes  Independent visualization of images, tracings, or specimens: yes    Risk of Complications, Morbidity, and/or Mortality  Presenting problems: high  Diagnostic procedures: high  Management options: high    Patient Progress  Patient progress: stable      Final diagnoses:   Viral pneumonia   Chronic atrial fibrillation            Edi Hansen MD  11/21/17 8699       Edi Hansen MD  11/22/17 0024

## 2017-11-22 NOTE — PROGRESS NOTES
Discharge Planning Assessment   Jesu     Patient Name: Cari Harper  MRN: [id number]  Today's Date: 11/22/2017    Admit Date: 11/21/2017          Discharge Needs Assessment       11/22/17 1157    Living Environment    Lives With alone    Living Arrangements house    Type of Financial/Environmental Concern none    Transportation Available car;family or friend will provide   Her son will provide transporatation at d/c.     Living Environment    Quality Of Family Relationships supportive;helpful    Able to Return to Prior Living Arrangements yes    Discharge Needs Assessment    Concerns To Be Addressed denies needs/concerns at this time    Readmission Within The Last 30 Days no previous admission in last 30 days    Community Agency Name(S) She does not have Home health and denies any need at this time.     Anticipated Changes Related to Illness none    Equipment Currently Used at Home walker, standard   She has a walker but does not use.    Equipment Needed After Discharge none   CM will follow and may need oxygen if she qualifies.     Discharge Disposition home or self-care    Discharge Contact Information if Applicable Doroteo Harper [phone] (pts son)            Discharge Plan       11/22/17 1201    Case Management/Social Work Plan    Plan She lives at home alone and plans to return home at d/c.  She has a walker via unknown provider but does not require the use of it.  She does not have home health and declines the need.  Her son will provide transportation at d/c.  She may need oxygen and S/S will arrange with MD order.    Patient/Family In Agreement With Plan yes    Additional Comments Admitted with dx of PNA and respiratory failure.  Started on O2@2L, Rocephin, Doxy, steroids and nebulizer tx's.  Blood cult pending. Continue tele monitoring.         Discharge Placement     No information found                Demographic Summary       11/22/17 1153    Referral Information    Admission Type  observation;inpatient    Arrived From admitted as an inpatient    Reason For Consult discharge planning    Record Reviewed medical record    Primary Care Physician Information    Name Traci WATT in [place]            Functional Status       11/22/17 1152    Functional Status Current    Current Functional Level Comment She has been up to walk in the miranda this morning without difficulty per PT.    Change in Functional Status Since Onset of Current Illness/Injury no    Functional Status Prior    Prior Functional Level Comment SHe is usually independent with ADL's.    IADL    Medications assistive person   Her son Shin sets her meds up in a weekly container.     Activity Tolerance    Current Activity Limitations none    Usual Activity Tolerance good    Current Activity Tolerance good    Cognitive/Perceptual/Developmental    Current Mental Status/Cognitive Functioning no deficits noted    Employment/Financial    Shift Worked other (see comments)    Financial Concerns none    Employment/Finance Comments She has Medicare, [employer] and [employer] for Rx coverage.  She denies any issues with coverage and Rx's.  Gets meds filled at [facility] in [place].               Legal       11/22/17 1156    Legal    Legal Comments She has a POA and her son Shin will bring in a copy.                   Renea Kaufman RN

## 2017-11-22 NOTE — PROGRESS NOTES
Patient is currently out of bed to chair having lunch, she reports feeling much better Dr. Levy's admission note and plan noted, labs and x-ray reviewed.  Agree with perihilar pneumonia and early signs of CHF decompensation as well as acute kidney injury..  We'll continue current treatment.  We will continue its cardiac enzymes and renal function monitoring as well as bowling status monitoring and decide deep we will continue IV diuretics.  Currently she is feeling better we will wait for lab results.  Son is at bedside and agree with plan of care.

## 2017-11-22 NOTE — ED NOTES
Provider at bedside at this time discussing results and plan to admit at this time, all questions and concerns addressed with pt and family, pt and family verbalize understanding.     Namrata Smith RN  11/22/17 0104

## 2017-11-22 NOTE — ED NOTES
Pt will have Trop drawn in ED prior to going to floor, floor nurse made aware.     Namrata Smith RN  11/22/17 0051

## 2017-11-22 NOTE — ED NOTES
Updated pt that report had been called to floor nurse and pt will be going up to the floor as soon as blood is drawn.     Namrata Smith RN  11/22/17 0102

## 2017-11-22 NOTE — ED NOTES
2L via NC placed on pt after ABG was complete per provider order.     Namrata Smith RN  11/21/17 7636

## 2017-11-22 NOTE — PLAN OF CARE
Problem: Patient Care Overview (Adult)  Goal: Plan of Care Review  Outcome: Ongoing (interventions implemented as appropriate)    Problem: Pneumonia (Adult)  Goal: Signs and Symptoms of Listed Potential Problems Will be Absent or Manageable (Pneumonia)  Outcome: Ongoing (interventions implemented as appropriate)    Problem: Fall Risk (Adult)  Goal: Identify Related Risk Factors and Signs and Symptoms  Outcome: Outcome(s) achieved Date Met:  11/22/17  Goal: Absence of Falls  Outcome: Ongoing (interventions implemented as appropriate)    Problem: Pressure Ulcer Risk (Kerwin Scale) (Adult,Obstetrics,Pediatric)  Goal: Identify Related Risk Factors and Signs and Symptoms  Outcome: Outcome(s) achieved Date Met:  11/22/17  Goal: Skin Integrity  Outcome: Ongoing (interventions implemented as appropriate)

## 2017-11-22 NOTE — H&P
Hospitalist History and Physical    Patient Identification:  Name: Cari Harper  Age/Sex: 77 y.o. female  :  1940  MRN: [id number]         Primary Care Physician: ALYSA Marrero    Chief Complaint   Patient presents with   • Shortness of Breath   • Fever       History of Present Illness  Patient is a 77 y.o. female presents with the following: Fatigue, fever and malaise    The patient is a pleasant 77-year-old female with past medical history significant for congestive heart failure with diastolic dysfunction, coronary artery disease and atrial fibrillation who presents the emergency department complaining of malaise, fatigue and fever.    The patient states that approximately 4 days ago she experienced severe fatigue with malaise.  She also reports a nonproductive cough.  She complains of shortness of breath/dyspnea on exertion.  The patient denies any nausea, vomiting and/or diarrhea.  She denies any abdominal pain.  She denies chest pain and/or palpitations.  She complains of chronic lower extremity edema.    The patient denies any known sick contacts but states that she visits her son who lives in a nursing facility in [place] as he suffers from ALS.    The patient reports that apart from the above-mentioned symptoms she has been in her usual state of health with the exception of a fall that occurred last Monday.  The patient denies syncope but states that she doesn't really remember the events surrounding her fall.  She states that she crawled through her 4 year and made a phone call for help.  The patient does complain of overall generalized weakness.    In the emergency department, the patient was found to be febrile with a maximum temperature of 101.5°F.  She was tachypnic with respirations up to 28 and her oxygen saturation was 86% via room air.  Chemistries were remarkable for a creatinine that was slightly elevated at 1.20.  Fine creatinine appears to be around  0.7-0.88.  Her BNP was elevated at 771.  Urinalysis revealed 7-12 epithelial cells, trace bacteria, 13-20 white blood cells and large leukocyte esterase with negative nitrites.  The patient had a portable chest x-ray with bilateral perihilar bronchial wall thickening and questionable infiltrate.  This was per virtual radiologic.    The patient has been admitted to the telemetry floor as a medical/telemetry patient for further evaluation and management.    Present during visit: patient's son and RUTHIE Yen    Past History:  Past Medical History:   Diagnosis Date   • Anxiety    • Arthritis    • Atrial fibrillation    • Coronary artery disease    • Disease of thyroid gland    • DVT (deep venous thrombosis)    • Hyperlipidemia    • Hypertension      Past Surgical History:   Procedure Laterality Date   • APPENDECTOMY     • CHOLECYSTECTOMY     • COLONOSCOPY W/ BIOPSIES     • EYE SURGERY     • TUBAL ABDOMINAL LIGATION       Family History   Problem Relation Age of Onset   • Cancer Father    • No Known Problems Mother      Social History   Substance Use Topics   • Smoking status: Former Smoker     Packs/day: 1.00     Years: 0.00     Types: Cigarettes     Start date: 8/11/1961     Quit date: 8/11/1986   • Smokeless tobacco: Never Used   • Alcohol use No     Prescriptions Prior to Admission   Medication Sig Dispense Refill Last Dose   • apixaban (ELIQUIS) 5 MG tablet tablet Take 1 tablet by mouth Every 12 (Twelve) Hours. (Patient taking differently: Take 5 mg by mouth 2 (Two) Times a Day.) 42 tablet 0 11/21/2017 at am   • bumetanide (BUMEX) 2 MG tablet Take 2 mg by mouth Every Morning.   11/21/2017 at am   • clonazePAM (KlonoPIN) 0.5 MG tablet Take 0.5 mg by mouth Every Morning.   11/21/2017 at am   • clonazePAM (KlonoPIN) 1 MG tablet Take 1 mg by mouth Every Night.   11/20/2017 at pm   • ferrous sulfate 325 (65 FE) MG tablet Take 325 mg by mouth Every Evening.   11/20/2017 at pm   • flecainide (TAMBOCOR) 50 MG tablet Take 1  tablet by mouth 2 (Two) Times a Day. 60 tablet 11 11/21/2017 at am   • FLUoxetine (PROzac) 20 MG capsule Take 20 mg by mouth 2 (two) times a day.   11/21/2017 at am   • Omega-3 Fatty Acids (FISH OIL) 1200 MG capsule capsule Take 2,400 mg by mouth Every Evening.   11/20/2017 at pm   • potassium chloride (K-DUR) 10 MEQ CR tablet Take 1 tablet by mouth Daily. (Patient taking differently: Take 10 mEq by mouth Every Morning.) 30 tablet 4 11/21/2017 at am   • Prenatal Vit-Fe Fumarate-FA (PRENATAL VITAMIN 27-0.8) 27-0.8 MG tablet tablet Take 1 tablet by mouth Every Evening.   11/20/2017 at pm   • Thyroid 60 MG PO tablet Take 60 mg by mouth Every Morning.   11/21/2017 at am     Allergies: Review of patient's allergies indicates no known allergies.    Review of Systems:  Review of Systems   Constitutional: Positive for chills, fatigue and fever. Negative for diaphoresis.   HENT: Positive for congestion. Negative for hearing loss, tinnitus and trouble swallowing.    Eyes: Negative for photophobia, discharge and visual disturbance.   Respiratory: Positive for cough and shortness of breath. Negative for wheezing.    Cardiovascular: Negative for chest pain, palpitations and leg swelling.   Gastrointestinal: Negative for abdominal pain, constipation, diarrhea, nausea and vomiting.   Endocrine: Negative for polydipsia, polyphagia and polyuria.   Genitourinary: Negative for dysuria, frequency and hematuria.   Musculoskeletal: Negative for gait problem, myalgias and neck pain.   Skin: Negative for rash and wound.   Neurological: Positive for weakness. Negative for dizziness, tremors, seizures, syncope and light-headedness.   Hematological: Does not bruise/bleed easily.   Psychiatric/Behavioral: Negative for confusion, hallucinations and suicidal ideas.      Vital Signs  Temp:  [97.8 °F (36.6 °C)-101.5 °F (38.6 °C)] 97.8 °F (36.6 °C)  Heart Rate:  [64-72] 70  Resp:  [20-28] 20  BP: (111-142)/(56-74) 139/56  Body mass index is 43.28  kg/(m^2).    Physical Exam:  Physical Exam   Constitutional: She is oriented to person, place, and time. She appears well-developed and well-nourished. She appears ill. No distress.   HENT:   Head: Normocephalic and atraumatic.   Nose: Nose normal.   Mouth/Throat: Oropharynx is clear and moist and mucous membranes are normal.   Eyes: Conjunctivae and EOM are normal. Pupils are equal, round, and reactive to light. No scleral icterus.   Neck: Trachea normal. Neck supple. No JVD present. Carotid bruit is not present. No thyromegaly present.   Cardiovascular: Normal rate, regular rhythm, normal heart sounds and normal pulses.  Exam reveals no gallop and no friction rub.    No murmur heard.  Trace bilateral lower extremity edema   Pulmonary/Chest: Effort normal. No respiratory distress. She has no wheezes. She has rhonchi in the right middle field and the right lower field. She has no rales.   Abdominal: Soft. Bowel sounds are normal. She exhibits no distension. There is no tenderness. There is no guarding.   Musculoskeletal: Normal range of motion.   Neurological: She is alert and oriented to person, place, and time. She has normal strength. No cranial nerve deficit.   Skin: Skin is warm, dry and intact. No rash noted. No erythema.   Psychiatric: She has a normal mood and affect. Her speech is delayed.     Results Review:      Results from last 7 days  Lab Units 11/21/17  2216   PH, ARTERIAL pH units 7.516*   PO2 ART mm Hg 45.4*   PCO2, ARTERIAL mm Hg 32.6*   HCO3 ART mmol/L 25.8         Results from last 7 days  Lab Units 11/21/17  2221   WBC 10*3/mm3 9.12   HEMOGLOBIN g/dL 10.5*   HEMATOCRIT % 33.4*   PLATELETS 10*3/mm3 267       Results from last 7 days  Lab Units 11/21/17  2221   SODIUM mmol/L 137   POTASSIUM mmol/L 3.5   CHLORIDE mmol/L 101   CO2 mmol/L 27.9   BUN mg/dL 23*   CREATININE mg/dL 1.20   CALCIUM mg/dL 9.2   GLUCOSE mg/dL 118*       Results from last 7 days  Lab Units 11/21/17  2221   BILIRUBIN mg/dL 0.8    ALK PHOS U/L 175*   AST (SGOT) U/L 29   ALT (SGPT) U/L 21       Results from last 7 days  Lab Units 11/21/17  2221   CRP mg/dL 12.40*           Results from last 7 days  Lab Units 11/22/17  0025 11/21/17  2221   TROPONIN I ng/mL 0.023 0.016       Results from last 7 days  Lab Units 11/21/17  2221   INR  1.43*         Imaging:    I have personally reviewed the EKG. Per my view it appears to be skewed by artifact    Imaging Results (most recent)     Procedure Component Value Units Date/Time    XR Chest 1 View [[id number]] Resulted:  11/21/17 2339     Updated:  11/21/17 2339      *Per my view the patient does appear to have bilateral jodee-hilar fullness that may represent infiltrates; no effusions    Assessment/Plan      -Systemic inflammatory response syndrome (SIRS) with fever and tachypnea that is probably related to bilateral community acquired pneumonia  -Probable mild acute CHF exacerbation with diastolic dysfunction that may be related to above  -Acute hypoxic respiratory failure with respiratory alkalosis likely due to hyperventilation and related to SIRS/pneumonia  -TAHIR on top of stage III CKD  -Mildly elevated alkaline phosphatase  -Chronic normocytic anemia  -Essential hypertension, currently uncontrolled  -History of DVT    The patient has been admitted to the telemetry unit as a medical telemetry patient.  She has been placed on IV Solu-Medrol, scheduled DuoNeb nebs, IV Rocephin and doxycycline.  She is receiving oxygen therapy that will be titrated for oxygen saturations greater than 90%.  We'll follow-up on the official chest x-ray report.  I have ordered for mycoplasma IgM antibody and a urine Legionella antigen.  I will update the patient's echocardiogram.  He received a one-time dose of IV Bumex in the emergency department.  I will hold on her oral Bumex as the patient does have some mild renal failure. The patient may require oxygen therapy prior to discharge home.    I will check a TSH and CRP in  the morning. I will repeat a CBC and CMP in the morning. I have consulted physical therapy.     DVT prophylaxis: Subcutaneous heparin    Estimated Length of Stay: > 2 MNs    I discussed the patients findings and my recommendations with patient, family and nursing staff      Arminda Jacinto DO  11/22/17  2:21 AM

## 2017-11-22 NOTE — ED NOTES
Provider made aware of pts temp at this time, new orders received.     Namrata Smith RN  11/21/17 9569

## 2017-11-22 NOTE — PLAN OF CARE
Problem: Inpatient Physical Therapy  Goal: Bed Mobility Goal LTG- PT  Outcome: Ongoing (interventions implemented as appropriate)    11/22/17 1552   Bed Mobility PT LTG   Bed Mobility PT LTG, Date Established 11/22/17   Bed Mobility PT LTG, Time to Achieve by discharge   Bed Mobility PT LTG, Activity Type all bed mobility   Bed Mobility PT LTG, [place] Level contact guard assist   Bed Mobility PT Goal LTG, Assist Device bed rails       Goal: Transfer Training Goal 1 LTG- PT  Outcome: Ongoing (interventions implemented as appropriate)    11/22/17 1552   Transfer Training PT LTG   Transfer Training PT LTG, Date Established 11/22/17   Transfer Training PT LTG, Time to Achieve by discharge   Transfer Training PT LTG, Activity Type all transfers   Transfer Training PT LTG, [place] Level contact guard assist   Transfer Training PT LTG, Assist Device walker, rolling       Goal: Gait Training Goal LTG- PT  Outcome: Ongoing (interventions implemented as appropriate)    11/22/17 1552   Gait Training PT LTG   Gait Training Goal PT LTG, Date Established 11/22/17   Gait Training Goal PT LTG, Time to Achieve by discharge   Gait Training Goal PT LTG, [place] Level contact guard assist   Gait Training Goal PT LTG, Assist Device walker, rolling   Gait Training Goal PT LTG, Distance to Achieve 80

## 2017-11-23 ENCOUNTER — APPOINTMENT (OUTPATIENT)
Dept: GENERAL RADIOLOGY | Facility: HOSPITAL | Age: 77
End: 2017-11-23

## 2017-11-23 PROCEDURE — 25010000002 METHYLPREDNISOLONE PER 40 MG: Performed by: INTERNAL MEDICINE

## 2017-11-23 PROCEDURE — 25010000002 CEFTRIAXONE: Performed by: HOSPITALIST

## 2017-11-23 PROCEDURE — 99232 SBSQ HOSP IP/OBS MODERATE 35: CPT | Performed by: HOSPITALIST

## 2017-11-23 PROCEDURE — 71020 XR CHEST PA AND LATERAL: CPT | Performed by: RADIOLOGY

## 2017-11-23 PROCEDURE — 94799 UNLISTED PULMONARY SVC/PX: CPT

## 2017-11-23 PROCEDURE — 71020 HC CHEST PA AND LATERAL: CPT

## 2017-11-23 PROCEDURE — 63710000001 PREDNISONE PER 1 MG: Performed by: HOSPITALIST

## 2017-11-23 RX ORDER — POTASSIUM CHLORIDE 1.5 G/1.77G
40 POWDER, FOR SOLUTION ORAL AS NEEDED
Status: DISCONTINUED | OUTPATIENT
Start: 2017-11-23 | End: 2017-11-29 | Stop reason: HOSPADM

## 2017-11-23 RX ORDER — POTASSIUM CHLORIDE 7.45 MG/ML
10 INJECTION INTRAVENOUS
Status: DISCONTINUED | OUTPATIENT
Start: 2017-11-23 | End: 2017-11-29 | Stop reason: HOSPADM

## 2017-11-23 RX ORDER — IPRATROPIUM BROMIDE AND ALBUTEROL SULFATE 2.5; .5 MG/3ML; MG/3ML
3 SOLUTION RESPIRATORY (INHALATION) 2 TIMES DAILY
Status: DISCONTINUED | OUTPATIENT
Start: 2017-11-23 | End: 2017-11-29 | Stop reason: HOSPADM

## 2017-11-23 RX ORDER — POTASSIUM CHLORIDE 20 MEQ/1
40 TABLET, EXTENDED RELEASE ORAL EVERY 4 HOURS
Status: COMPLETED | OUTPATIENT
Start: 2017-11-23 | End: 2017-11-23

## 2017-11-23 RX ORDER — NITROGLYCERIN 0.4 MG/1
0.4 TABLET SUBLINGUAL
Status: DISCONTINUED | OUTPATIENT
Start: 2017-11-23 | End: 2017-11-29 | Stop reason: HOSPADM

## 2017-11-23 RX ORDER — PREDNISONE 20 MG/1
20 TABLET ORAL DAILY
Status: DISCONTINUED | OUTPATIENT
Start: 2017-11-23 | End: 2017-11-29 | Stop reason: HOSPADM

## 2017-11-23 RX ORDER — POTASSIUM CHLORIDE 750 MG/1
40 CAPSULE, EXTENDED RELEASE ORAL AS NEEDED
Status: DISCONTINUED | OUTPATIENT
Start: 2017-11-23 | End: 2017-11-29 | Stop reason: HOSPADM

## 2017-11-23 RX ADMIN — POTASSIUM CHLORIDE 40 MEQ: 1500 TABLET, EXTENDED RELEASE ORAL at 17:10

## 2017-11-23 RX ADMIN — FLECAINIDE ACETATE 50 MG: 50 TABLET ORAL at 07:44

## 2017-11-23 RX ADMIN — Medication 1 CAPSULE: at 07:44

## 2017-11-23 RX ADMIN — APIXABAN 5 MG: 5 TABLET, FILM COATED ORAL at 20:21

## 2017-11-23 RX ADMIN — IPRATROPIUM BROMIDE AND ALBUTEROL SULFATE 3 ML: .5; 3 SOLUTION RESPIRATORY (INHALATION) at 18:27

## 2017-11-23 RX ADMIN — FLUOXETINE HYDROCHLORIDE 20 MG: 20 CAPSULE ORAL at 20:21

## 2017-11-23 RX ADMIN — FERROUS SULFATE TAB 325 MG (65 MG ELEMENTAL FE) 325 MG: 325 (65 FE) TAB at 15:39

## 2017-11-23 RX ADMIN — CEFTRIAXONE 1 G: 1 INJECTION, POWDER, FOR SOLUTION INTRAMUSCULAR; INTRAVENOUS at 17:10

## 2017-11-23 RX ADMIN — THYROID, PORCINE 60 MG: 60 TABLET ORAL at 05:50

## 2017-11-23 RX ADMIN — FLUOXETINE HYDROCHLORIDE 20 MG: 20 CAPSULE ORAL at 07:44

## 2017-11-23 RX ADMIN — CLONAZEPAM 0.5 MG: 0.5 TABLET ORAL at 20:21

## 2017-11-23 RX ADMIN — FLECAINIDE ACETATE 50 MG: 50 TABLET ORAL at 20:21

## 2017-11-23 RX ADMIN — METHYLPREDNISOLONE SODIUM SUCCINATE 40 MG: 40 INJECTION, POWDER, FOR SOLUTION INTRAMUSCULAR; INTRAVENOUS at 10:54

## 2017-11-23 RX ADMIN — POTASSIUM CHLORIDE 40 MEQ: 1500 TABLET, EXTENDED RELEASE ORAL at 13:23

## 2017-11-23 RX ADMIN — DOXYCYCLINE 100 MG: 100 INJECTION, POWDER, LYOPHILIZED, FOR SOLUTION INTRAVENOUS at 15:39

## 2017-11-23 RX ADMIN — POTASSIUM CHLORIDE 10 MEQ: 750 TABLET, FILM COATED, EXTENDED RELEASE ORAL at 07:44

## 2017-11-23 RX ADMIN — DOXYCYCLINE 100 MG: 100 INJECTION, POWDER, LYOPHILIZED, FOR SOLUTION INTRAVENOUS at 05:47

## 2017-11-23 RX ADMIN — PREDNISONE 20 MG: 20 TABLET ORAL at 13:23

## 2017-11-23 RX ADMIN — IPRATROPIUM BROMIDE AND ALBUTEROL SULFATE 3 ML: .5; 3 SOLUTION RESPIRATORY (INHALATION) at 06:18

## 2017-11-23 RX ADMIN — APIXABAN 5 MG: 5 TABLET, FILM COATED ORAL at 07:43

## 2017-11-23 NOTE — PLAN OF CARE
Problem: Patient Care Overview (Adult)  Goal: Plan of Care Review  Outcome: Ongoing (interventions implemented as appropriate)    Problem: Pneumonia (Adult)  Goal: Signs and Symptoms of Listed Potential Problems Will be Absent or Manageable (Pneumonia)  Outcome: Ongoing (interventions implemented as appropriate)    Problem: Fall Risk (Adult)  Goal: Absence of Falls  Outcome: Ongoing (interventions implemented as appropriate)    Problem: Pressure Ulcer Risk (Kerwin Scale) (Adult,Obstetrics,Pediatric)  Goal: Skin Integrity  Outcome: Ongoing (interventions implemented as appropriate)

## 2017-11-23 NOTE — PLAN OF CARE
Problem: Patient Care Overview (Adult)  Goal: Plan of Care Review  Outcome: Ongoing (interventions implemented as appropriate)    11/22/17 1918 11/22/17 1953   Patient Care Overview   Progress --  progress toward functional goals as expected   Coping/Psychosocial Response Interventions   Plan Of Care Reviewed With patient;son --        Goal: Adult Individualization and Mutuality  Outcome: Ongoing (interventions implemented as appropriate)    Problem: Pneumonia (Adult)  Goal: Signs and Symptoms of Listed Potential Problems Will be Absent or Manageable (Pneumonia)  Outcome: Ongoing (interventions implemented as appropriate)    Problem: Fall Risk (Adult)  Goal: Absence of Falls  Outcome: Ongoing (interventions implemented as appropriate)    Problem: Pressure Ulcer Risk (Kerwin Scale) (Adult,Obstetrics,Pediatric)  Goal: Skin Integrity  Outcome: Ongoing (interventions implemented as appropriate)

## 2017-11-23 NOTE — PROGRESS NOTES
[facility]     Patient Identification:  Name:  Cari Harper  Age:  77 y.o.  Sex:  female  :  1940  MRN:  [id number]  Visit Number:  [id number]  Primary Care Provider:  ALYSA Marrero    Length of stay:  1    Subjective:  Patient is out of bed to chair, eating well reporting multiple urination and good bowel movement she felt much better, complaining of tremors, son is at bedside.   ----------------------------------------------------------------------------------------------------------------------  Current Hospital Meds:    apixaban 5 mg Oral Q12H   ceftriaxone 1 g Intravenous Q24H   clonazePAM 0.5 mg Oral Nightly   doxycycline 100 mg Intravenous Q12H   ferrous sulfate 325 mg Oral Q PM   flecainide 50 mg Oral Q12H   FLUoxetine 20 mg Oral Q12H   ipratropium-albuterol 3 mL Nebulization 4x Daily - RT   methylPREDNISolone sodium succinate 40 mg Intravenous Q12H   potassium chloride 10 mEq Oral Daily   Risaquad-2 1 capsule Oral Daily   Thyroid 60 mg Oral QAM        ----------------------------------------------------------------------------------------------------------------------  Vital Signs:  Temp:  [97.3 °F (36.3 °C)-98.2 °F (36.8 °C)] 97.7 °F (36.5 °C)  Heart Rate:  [63-74] 64  Resp:  [18] 18  BP: (111-142)/(48-65) 121/65       Tele:   Last 3 weights    17  2152 17  0055 17  0341   Weight: 194 lb (88 kg) 229 lb 1 oz (104 kg) 228 lb 12.8 oz (104 kg)     Body mass index is 43.23 kg/(m^2).    Intake/Output Summary (Last 24 hours) at 17 1119  Last data filed at 17 0812   Gross per 24 hour   Intake              820 ml   Output             1250 ml   Net             -430 ml     Diet Regular; Cardiac  ----------------------------------------------------------------------------------------------------------------------  Physical exam:  General: Comfortable,awake, alert, oriented to self, place, and time, well-developed and  well-nourished.  No respiratory distress.    Skin:  Skin is warm and dry. No rash noted. No pallor.    HENT:  Head:  Normocephalic and atraumatic.  Mouth:  Moist mucous membranes.    Eyes:  Conjunctivae and EOM are normal.  Pupils are equal, round, and reactive to light.  No scleral icterus.    Neck:  Neck supple.  No JVD present.    Pulmonary/Chest:  No respiratory distress, no wheezes, no crackles, with normal breath sounds and good air movement.  Cardiovascular:  Normal rate, regular rhythm and normal heart sounds with no murmur.  Abdominal:  Soft.  Bowel sounds are normal.  No distension and no tenderness.   Extremities:  No edema, no tenderness, and no deformity.  No red or swollen joints anywhere.  Strong pulses in all 4 extremities with no clubbing, no cyanosis, no edema.  Neurological:  Motor strength equal no obvious deficit, sensory grossly intact.   No cranial nerve deficit.  No tongue deviation.  No facial droop.  No slurred speech.      ----------------------------------------------------------------------------------------------------------------------  ----------------------------------------------------------------------------------------------------------------------    Results from last 7 days  Lab Units 11/22/17  0025 11/21/17  2221   TROPONIN I ng/mL 0.023 0.016               Results from last 7 days  Lab Units 11/22/17  0405 11/22/17  0231 11/21/17  2221   CRP mg/dL 12.45*  --  12.40*   LACTATE mmol/L  --   --  0.9   WBC 10*3/mm3  --  9.08 9.12   HEMOGLOBIN g/dL  --  9.7* 10.5*   HEMATOCRIT %  --  31.7* 33.4*   MCV fL  --  85.7 83.7   MCHC g/dL  --  30.6* 31.4*   PLATELETS 10*3/mm3  --  242 267   INR   --   --  1.43*       Results from last 7 days  Lab Units 11/21/17  2216   PH, ARTERIAL pH units 7.516*   PO2 ART mm Hg 45.4*   PCO2, ARTERIAL mm Hg 32.6*   HCO3 ART mmol/L 25.8       Results from last 7 days  Lab Units 11/22/17  0231 11/21/17  2221   SODIUM mmol/L 139 137   POTASSIUM mmol/L 3.2*  3.5   CHLORIDE mmol/L 105 101   CO2 mmol/L 21.7* 27.9   BUN mg/dL 21 23*   CREATININE mg/dL 1.17 1.20   EGFR IF NONAFRICN AM mL/min/1.73 45* 43*   CALCIUM mg/dL 8.4 9.2   GLUCOSE mg/dL 166* 118*   ALBUMIN g/dL 3.90 4.10   BILIRUBIN mg/dL 0.7 0.8   ALK PHOS U/L 174* 175*   AST (SGOT) U/L 23 29   ALT (SGPT) U/L 11 21   Estimated Creatinine Clearance: 44 mL/min (by C-G formula based on Cr of 1.17).    No results found for: AMMONIA      Blood Culture   Date Value Ref Range Status   11/21/2017 No growth at 24 hours  Preliminary   11/21/2017 No growth at 24 hours  Preliminary                I have personally looked at the labs and they are summarized above.  ----------------------------------------------------------------------------------------------------------------------  Imaging Results (last 24 hours)     ** No results found for the last 24 hours. **        ----------------------------------------------------------------------------------------------------------------------  Assessment and Plan:    - Bilateral  perihilar pneumonia.    - Early signs of acute diastolic heart failure and chronic clinically she is now euvolemic.    - History of atrial fibrillation, currently in sinus, on chronic anticoagulation.    - Acute hypoxic Respiratory failure secondary to acute CHF and pneumonia.    - Mild elevation of troponin, patient is chest pain-free and did not report any discomfort prior to admission most likely secondary to hypoxemia from acute pressure failure.  We'll repeat troponin in the morning.      - Severe pulmonary hypertension on echo.    - Moderate tricuspid regurgitation on echo.    Continue current treatment changes Solu-Medrol to by mouth, decreased neb treatment frequency, will repeat chest x-ray in the morning, we will also assess oxygen supplementation needs.    Morena Sarah MD  11/23/17  11:19 AM

## 2017-11-24 LAB
ANION GAP SERPL CALCULATED.3IONS-SCNC: 5.8 MMOL/L (ref 3.6–11.2)
BASOPHILS # BLD AUTO: 0 10*3/MM3 (ref 0–0.3)
BASOPHILS NFR BLD AUTO: 0 % (ref 0–2)
BUN BLD-MCNC: 32 MG/DL (ref 7–21)
BUN/CREAT SERPL: 42.7 (ref 7–25)
CALCIUM SPEC-SCNC: 9.1 MG/DL (ref 7.7–10)
CHLORIDE SERPL-SCNC: 110 MMOL/L (ref 99–112)
CO2 SERPL-SCNC: 25.2 MMOL/L (ref 24.3–31.9)
CREAT BLD-MCNC: 0.75 MG/DL (ref 0.43–1.29)
CRP SERPL-MCNC: 9.4 MG/DL (ref 0–0.99)
DEPRECATED RDW RBC AUTO: 45.8 FL (ref 37–54)
EOSINOPHIL # BLD AUTO: 0 10*3/MM3 (ref 0–0.7)
EOSINOPHIL NFR BLD AUTO: 0 % (ref 0–7)
ERYTHROCYTE [DISTWIDTH] IN BLOOD BY AUTOMATED COUNT: 15 % (ref 11.5–14.5)
GFR SERPL CREATININE-BSD FRML MDRD: 75 ML/MIN/1.73
GLUCOSE BLD-MCNC: 116 MG/DL (ref 70–110)
HCT VFR BLD AUTO: 31.2 % (ref 37–47)
HGB BLD-MCNC: 9.6 G/DL (ref 12–16)
IMM GRANULOCYTES # BLD: 0.03 10*3/MM3 (ref 0–0.03)
IMM GRANULOCYTES NFR BLD: 0.3 % (ref 0–0.5)
LYMPHOCYTES # BLD AUTO: 0.49 10*3/MM3 (ref 1–3)
LYMPHOCYTES NFR BLD AUTO: 4.8 % (ref 16–46)
MAGNESIUM SERPL-MCNC: 2.5 MG/DL (ref 1.7–2.6)
MCH RBC QN AUTO: 26.4 PG (ref 27–33)
MCHC RBC AUTO-ENTMCNC: 30.8 G/DL (ref 33–37)
MCV RBC AUTO: 85.7 FL (ref 80–94)
MONOCYTES # BLD AUTO: 0.82 10*3/MM3 (ref 0.1–0.9)
MONOCYTES NFR BLD AUTO: 8 % (ref 0–12)
NEUTROPHILS # BLD AUTO: 8.91 10*3/MM3 (ref 1.4–6.5)
NEUTROPHILS NFR BLD AUTO: 86.9 % (ref 40–75)
OSMOLALITY SERPL CALC.SUM OF ELEC: 289.1 MOSM/KG (ref 273–305)
PHOSPHATE SERPL-MCNC: 3.8 MG/DL (ref 2.7–4.5)
PLATELET # BLD AUTO: 254 10*3/MM3 (ref 130–400)
PMV BLD AUTO: 11.2 FL (ref 6–10)
POTASSIUM BLD-SCNC: 4.6 MMOL/L (ref 3.5–5.3)
RBC # BLD AUTO: 3.64 10*6/MM3 (ref 4.2–5.4)
SODIUM BLD-SCNC: 141 MMOL/L (ref 135–153)
TROPONIN I SERPL-MCNC: 0.04 NG/ML
WBC NRBC COR # BLD: 10.25 10*3/MM3 (ref 4.5–12.5)

## 2017-11-24 PROCEDURE — 25010000002 CEFTRIAXONE: Performed by: HOSPITALIST

## 2017-11-24 PROCEDURE — 80048 BASIC METABOLIC PNL TOTAL CA: CPT | Performed by: HOSPITALIST

## 2017-11-24 PROCEDURE — 83735 ASSAY OF MAGNESIUM: CPT | Performed by: PHYSICIAN ASSISTANT

## 2017-11-24 PROCEDURE — 25010000002 FUROSEMIDE PER 20 MG: Performed by: HOSPITALIST

## 2017-11-24 PROCEDURE — 97530 THERAPEUTIC ACTIVITIES: CPT

## 2017-11-24 PROCEDURE — 97110 THERAPEUTIC EXERCISES: CPT

## 2017-11-24 PROCEDURE — 94799 UNLISTED PULMONARY SVC/PX: CPT

## 2017-11-24 PROCEDURE — 84484 ASSAY OF TROPONIN QUANT: CPT | Performed by: HOSPITALIST

## 2017-11-24 PROCEDURE — 97116 GAIT TRAINING THERAPY: CPT

## 2017-11-24 PROCEDURE — 99232 SBSQ HOSP IP/OBS MODERATE 35: CPT | Performed by: HOSPITALIST

## 2017-11-24 PROCEDURE — 63710000001 PREDNISONE PER 1 MG: Performed by: HOSPITALIST

## 2017-11-24 PROCEDURE — 86140 C-REACTIVE PROTEIN: CPT | Performed by: PHYSICIAN ASSISTANT

## 2017-11-24 PROCEDURE — 84100 ASSAY OF PHOSPHORUS: CPT | Performed by: PHYSICIAN ASSISTANT

## 2017-11-24 PROCEDURE — 85025 COMPLETE CBC W/AUTO DIFF WBC: CPT | Performed by: HOSPITALIST

## 2017-11-24 RX ORDER — FUROSEMIDE 10 MG/ML
20 INJECTION INTRAMUSCULAR; INTRAVENOUS ONCE
Status: COMPLETED | OUTPATIENT
Start: 2017-11-24 | End: 2017-11-24

## 2017-11-24 RX ADMIN — APIXABAN 5 MG: 5 TABLET, FILM COATED ORAL at 07:51

## 2017-11-24 RX ADMIN — FLECAINIDE ACETATE 50 MG: 50 TABLET ORAL at 07:51

## 2017-11-24 RX ADMIN — THYROID, PORCINE 60 MG: 60 TABLET ORAL at 05:29

## 2017-11-24 RX ADMIN — FERROUS SULFATE TAB 325 MG (65 MG ELEMENTAL FE) 325 MG: 325 (65 FE) TAB at 13:05

## 2017-11-24 RX ADMIN — CEFTRIAXONE 1 G: 1 INJECTION, POWDER, FOR SOLUTION INTRAMUSCULAR; INTRAVENOUS at 16:41

## 2017-11-24 RX ADMIN — IPRATROPIUM BROMIDE AND ALBUTEROL SULFATE 3 ML: .5; 3 SOLUTION RESPIRATORY (INHALATION) at 06:22

## 2017-11-24 RX ADMIN — FUROSEMIDE 20 MG: 10 INJECTION, SOLUTION INTRAMUSCULAR; INTRAVENOUS at 13:05

## 2017-11-24 RX ADMIN — FLUOXETINE HYDROCHLORIDE 20 MG: 20 CAPSULE ORAL at 07:51

## 2017-11-24 RX ADMIN — FLUOXETINE HYDROCHLORIDE 20 MG: 20 CAPSULE ORAL at 19:21

## 2017-11-24 RX ADMIN — PREDNISONE 20 MG: 20 TABLET ORAL at 07:50

## 2017-11-24 RX ADMIN — IPRATROPIUM BROMIDE AND ALBUTEROL SULFATE 3 ML: .5; 3 SOLUTION RESPIRATORY (INHALATION) at 18:43

## 2017-11-24 RX ADMIN — Medication 1 CAPSULE: at 07:50

## 2017-11-24 RX ADMIN — POTASSIUM CHLORIDE 10 MEQ: 750 TABLET, FILM COATED, EXTENDED RELEASE ORAL at 07:51

## 2017-11-24 RX ADMIN — CLONAZEPAM 0.5 MG: 0.5 TABLET ORAL at 19:21

## 2017-11-24 RX ADMIN — DOXYCYCLINE 100 MG: 100 INJECTION, POWDER, LYOPHILIZED, FOR SOLUTION INTRAVENOUS at 16:40

## 2017-11-24 RX ADMIN — DOXYCYCLINE 100 MG: 100 INJECTION, POWDER, LYOPHILIZED, FOR SOLUTION INTRAVENOUS at 05:30

## 2017-11-24 RX ADMIN — APIXABAN 5 MG: 5 TABLET, FILM COATED ORAL at 19:21

## 2017-11-24 RX ADMIN — FLECAINIDE ACETATE 50 MG: 50 TABLET ORAL at 19:21

## 2017-11-24 NOTE — PROGRESS NOTES
[facility]     Patient Identification:  Name:  Cari Harper  Age:  77 y.o.  Sex:  female  :  1940  MRN:  [id number]  Visit Number:  [id number]  Primary Care Provider:  ALYSA Marrero    Length of stay:  2    Subjective:  Patient is sitting on a chair she feels comfortable she still gets short of air with ambulation but reports improvement, she no longer has tremors, eating well had bowel movement.  She is diuresing well.  ----------------------------------------------------------------------------------------------------------------------  Current Hospital Meds:    apixaban 5 mg Oral Q12H   ceftriaxone 1 g Intravenous Q24H   clonazePAM 0.5 mg Oral Nightly   doxycycline 100 mg Intravenous Q12H   ferrous sulfate 325 mg Oral Q PM   flecainide 50 mg Oral Q12H   FLUoxetine 20 mg Oral Q12H   furosemide 20 mg Intravenous Once   ipratropium-albuterol 3 mL Nebulization BID   potassium chloride 10 mEq Oral Daily   predniSONE 20 mg Oral Daily   Risaquad-2 1 capsule Oral Daily   Thyroid 60 mg Oral QAM        ----------------------------------------------------------------------------------------------------------------------  Vital Signs:  Temp:  [98.1 °F (36.7 °C)-98.4 °F (36.9 °C)] 98.1 °F (36.7 °C)  Heart Rate:  [54-64] 54  Resp:  [18-20] 20  BP: (129-149)/(57-66) 149/57       Tele:   Last 3 weights    17  0055 17  0341 17  0309   Weight: 229 lb 1 oz (104 kg) 228 lb 12.8 oz (104 kg) 198 lb 3.2 oz (89.9 kg)     Body mass index is 37.45 kg/(m^2).    Intake/Output Summary (Last 24 hours) at 17 1232  Last data filed at 17 0900   Gross per 24 hour   Intake              840 ml   Output             1550 ml   Net             -710 ml     Diet Regular; Cardiac  ----------------------------------------------------------------------------------------------------------------------  Physical exam:    General: Comfortable,awake, alert, oriented  to self, place, and time, well-developed and well-nourished.  No respiratory distress.    Skin:  Skin is warm and dry. No rash noted. No pallor.    HENT:  Head:  Normocephalic and atraumatic.  Mouth:  Moist mucous membranes.    Eyes:  Conjunctivae and EOM are normal.  Pupils are equal, round, and reactive to light.  No scleral icterus.    Neck:  Neck supple.  No JVD present.    Pulmonary/Chest:  No respiratory distress, no wheezes, no crackles, with normal breath sounds and good air movement.  Cardiovascular:  Normal rate, regular rhythm and normal heart sounds with no murmur.  Abdominal:  Soft.  Bowel sounds are normal.  No distension and no tenderness.   Extremities:  No edema, no tenderness, and no deformity.  No red or swollen joints anywhere.  Strong pulses in all 4 extremities with no clubbing, no cyanosis, no edema.    ----------------------------------------------------------------------------------------------------------------------  ----------------------------------------------------------------------------------------------------------------------    Results from last 7 days  Lab Units 11/24/17  0510 11/22/17  0025 11/21/17  2221   TROPONIN I ng/mL 0.038 0.023 0.016       Results from last 7 days  Lab Units 11/24/17  0510 11/22/17  0405 11/22/17  0231 11/21/17  2221   CRP mg/dL 9.40* 12.45*  --  12.40*   LACTATE mmol/L  --   --   --  0.9   WBC 10*3/mm3 10.25  --  9.08 9.12   HEMOGLOBIN g/dL 9.6*  --  9.7* 10.5*   HEMATOCRIT % 31.2*  --  31.7* 33.4*   MCV fL 85.7  --  85.7 83.7   MCHC g/dL 30.8*  --  30.6* 31.4*   PLATELETS 10*3/mm3 254  --  242 267   INR   --   --   --  1.43*       Results from last 7 days  Lab Units 11/21/17  2216   PH, ARTERIAL pH units 7.516*   PO2 ART mm Hg 45.4*   PCO2, ARTERIAL mm Hg 32.6*   HCO3 ART mmol/L 25.8       Results from last 7 days  Lab Units 11/24/17  0510 11/22/17  0231 11/21/17  2221   SODIUM mmol/L 141 139 137   POTASSIUM mmol/L 4.6 3.2* 3.5   MAGNESIUM mg/dL 2.5   --   --    CHLORIDE mmol/L 110 105 101   CO2 mmol/L 25.2 21.7* 27.9   BUN mg/dL 32* 21 23*   CREATININE mg/dL 0.75 1.17 1.20   EGFR IF NONAFRICN AM mL/min/1.73 75 45* 43*   CALCIUM mg/dL 9.1 8.4 9.2   GLUCOSE mg/dL 116* 166* 118*   ALBUMIN g/dL  --  3.90 4.10   BILIRUBIN mg/dL  --  0.7 0.8   ALK PHOS U/L  --  174* 175*   AST (SGOT) U/L  --  23 29   ALT (SGPT) U/L  --  11 21   Estimated Creatinine Clearance: 59.1 mL/min (by C-G formula based on Cr of 0.75).    No results found for: AMMONIA      Blood Culture   Date Value Ref Range Status   11/21/2017 No growth at 2 days  Preliminary   11/21/2017 No growth at 2 days  Preliminary                I have personally looked at the labs and they are summarized above.  ----------------------------------------------------------------------------------------------------------------------  Imaging Results (last 24 hours)     Procedure Component Value Units Date/Time    XR Chest PA & Lateral [[id number]] Collected:  11/24/17 1026     Updated:  11/24/17 1029    Narrative:       EXAMINATION: XR CHEST PA AND LATERAL-      CLINICAL INDICATION: chf and pneumonia; J12.9-Viral pneumonia,  unspecified; I48.2-Chronic atrial fibrillation          COMPARISON: 11/21/2017      TECHNIQUE: XR CHEST PA AND LATERAL-      FINDINGS:   Increased interstitial markings and superimposed airspace disease in the  upper lobes bilaterally   Heart and mediastinal contours are unremarkable.   No pneumothorax.   Bony and soft tissue structures are unremarkable.            Impression:       Diffuse interstitial lung disease and probable superimposed  bronchopneumonia in the upper lobes     This report was finalized on 11/24/2017 10:27 AM by Dr. Simón Stone MD.           ----------------------------------------------------------------------------------------------------------------------  Assessment and Plan:    -Bilateral perihilar pneumonia.  - Acute hypoxic respiratory failure secondary to diastolic heart  failure and pneumonia.    - Acute diastolic heart failure chronic.    - Severe pulmonary hypertension on 2-D echo.    Continue current treatment, low-dose Lasix when necessary, fluid restriction, will repeat chest x-ray in the morning and hopefully home soon.  We'll also check pulse oximetry with ambulation on room air to see if she requires home oxygen.       Morena Sarah MD  11/24/17  12:32 PM

## 2017-11-24 NOTE — PLAN OF CARE
Problem: Patient Care Overview (Adult)  Goal: Plan of Care Review  Outcome: Ongoing (interventions implemented as appropriate)  Goal: Adult Individualization and Mutuality  Outcome: Ongoing (interventions implemented as appropriate)  Goal: Discharge Needs Assessment  Outcome: Ongoing (interventions implemented as appropriate)    Problem: Pneumonia (Adult)  Goal: Signs and Symptoms of Listed Potential Problems Will be Absent or Manageable (Pneumonia)  Outcome: Ongoing (interventions implemented as appropriate)    Problem: Fall Risk (Adult)  Goal: Absence of Falls  Outcome: Ongoing (interventions implemented as appropriate)    Problem: Pressure Ulcer Risk (Kerwin Scale) (Adult,Obstetrics,Pediatric)  Goal: Skin Integrity  Outcome: Ongoing (interventions implemented as appropriate)

## 2017-11-24 NOTE — THERAPY TREATMENT NOTE
Acute Care - Physical Therapy Treatment Note   Jesu     Patient Name: Cari Harper  : 1940  MRN: [id number]  Today's Date: 2017  Onset of Illness/Injury or Date of Surgery Date: 17  Date of Referral to PT: 17  Referring Physician: Dr. Jacinto    Admit Date: 2017    Visit Dx:    ICD-10-CM ICD-9-CM   1. Viral pneumonia J12.9 480.9   2. Chronic atrial fibrillation I48.2 427.31     Patient Active Problem List   Diagnosis   • Diarrhea   • Respiratory failure, acute   • Viral pneumonia               Adult Rehabilitation Note       17 1507          Rehab Assessment/Intervention    Discipline physical therapy assistant  -RF      Document Type therapy note (daily note)  -RF      Subjective Information agree to therapy  -RF      Patient Effort, Rehab Treatment good  -RF      Precautions/Limitations fall precautions  -RF      Patient Response to Treatment Patient demonstrated good tolerance to Te, however slight fatigue was noted with ambualtion. Pt able to use restroom toilet with safety frame this session. COntinued skilled care required for further strengthening and improved functional mobility.   -RF      Recorded by [RF] Alley Dubon PTA      Pain Assessment    Pain Assessment No/denies pain  -RF      Recorded by [RF] Alley Dubon PTA      Cognitive Assessment/Intervention    Current Cognitive/Communication Assessment functional  -RF      Orientation Status oriented to;person;place;situation  -RF      Follows Commands/Answers Questions able to follow single-step instructions;100% of the time  -RF      Personal Safety WNL/WFL  -RF      Personal Safety Interventions fall prevention program maintained;muscle strengthening facilitated;gait belt;nonskid shoes/slippers when out of bed  -RF      Recorded by [RF] Alley Dubon PTA      Bed Mobility, Assessment/Treatment    Bed Mobility, Assistive Device bed rails  -RF      Bed Mob, Supine to Sit, [place] minimum assist  (75% patient effort)  -RF      Bed Mob, Sit to Supine, [place] minimum assist (75% patient effort)  -RF      Recorded by [RF] Alley Dubon PTA      Transfer Assessment/Treatment    Transfers, Bed-Chair [place] minimum assist (75% patient effort);nonverbal cues required (demo/gesture);verbal cues required  -RF      Transfers, Chair-Bed [place] minimum assist (75% patient effort);nonverbal cues required (demo/gesture);verbal cues required  -RF      Transfers, Sit-Stand [place] minimum assist (75% patient effort);moderate assist (50% patient effort)  -RF      Transfers, Stand-Sit [place] minimum assist (75% patient effort);moderate assist (50% patient effort)  -RF      Transfers, Sit-Stand-Sit, Assist Device rolling walker  -RF      Toilet Transfer, [place] minimum assist (75% patient effort);verbal cues required;supervision required  -RF      Toilet Transfer, Assistive Device elevated toilet seat;rolling walker  -RF      Recorded by [RF] Alley Dubon PTA      Gait Assessment/Treatment    Gait, [place] Level minimum assist (75% patient effort);moderate assist (50% patient effort);2 person assist required  -RF      Gait, Assistive Device rolling walker  -RF      Gait, Distance (Feet) 80  -RF      Gait, Comment slight fatigue noted  -RF      Recorded by [RF] Alley Dubon PTA      Therapy Exercises    Bilateral Lower Extremities AROM:;15 reps;sitting  -RF      Recorded by [RF] Alley Dubon PTA      Positioning and Restraints    Pre-Treatment Position in bed  -RF      Post Treatment Position chair  -RF      In Chair sitting;call light within reach;encouraged to call for assist;with family/caregiver  -RF      Recorded by [RF] Alley Dubon PTA        User Key  (r) = Recorded By, (t) = Taken By, (c) = Cosigned By    Initials Name Effective Dates    RF Alley Dubon PTA 07/19/17 -                 IP PT Goals       11/22/17 1552          Bed Mobility PT LTG    Bed  Mobility PT LTG, Date Established 11/22/17  -BC      Bed Mobility PT LTG, Time to Achieve by discharge  -BC      Bed Mobility PT LTG, Activity Type all bed mobility  -BC      Bed Mobility PT LTG, [place] Level contact guard assist  -BC      Bed Mobility PT Goal  LTG, Assist Device bed rails  -BC      Transfer Training PT LTG    Transfer Training PT LTG, Date Established 11/22/17  -BC      Transfer Training PT LTG, Time to Achieve by discharge  -BC      Transfer Training PT LTG, Activity Type all transfers  -BC      Transfer Training PT LTG, [place] Level contact guard assist  -BC      Transfer Training PT LTG, Assist Device walker, rolling  -BC      Gait Training PT LTG    Gait Training Goal PT LTG, Date Established 11/22/17  -BC      Gait Training Goal PT LTG, Time to Achieve by discharge  -BC      Gait Training Goal PT LTG, [place] Level contact guard assist  -BC      Gait Training Goal PT LTG, Assist Device walker, rolling  -BC      Gait Training Goal PT LTG, Distance to Achieve 80  -BC        User Key  (r) = Recorded By, (t) = Taken By, (c) = Cosigned By    Initials Name Provider Type    ERICA Cardona, PT Physical Therapist          Physical Therapy Education     Title: PT OT SLP Therapies (Done)     Topic: Physical Therapy (Done)     Point: Mobility training (Done)    Learning Progress Summary    Learner Readiness Method Response Comment Documented by Status   Patient Acceptance E VU,NR   11/24/17 1510 Done    Acceptance E VU,NR   11/23/17 2145 Done    Acceptance E VU  BC 11/22/17 1551 Done               Point: Home exercise program (Done)    Learning Progress Summary    Learner Readiness Method Response Comment Documented by Status   Patient Acceptance E VU,NR   11/24/17 1510 Done    Acceptance E VU,NR   11/23/17 2145 Done    Acceptance E VU  BC 11/22/17 1551 Done               Point: Body mechanics (Done)    Learning Progress Summary    Learner Readiness Method Response Comment  Documented by Status   Patient Acceptance E VU,NR   11/24/17 1510 Done    Acceptance E VU,NR   11/23/17 2145 Done    Acceptance E VU  BC 11/22/17 1551 Done               Point: Precautions (Done)    Learning Progress Summary    Learner Readiness Method Response Comment Documented by Status   Patient Acceptance E VU,NR   11/24/17 1510 Done    Acceptance E VU,NR   11/23/17 2145 Done    Acceptance E VU  BC 11/22/17 1551 Done                      User Key     Initials Effective Dates Name Provider Type Discipline    BC 03/14/16 -  Adele Cardona, PT Physical Therapist PT     04/14/17 -  Richmond Seals, RN Registered Nurse Nurse     07/19/17 -  Alley Dubon PTA Physical Therapy Assistant PT                    PT Recommendation and Plan  Anticipated Equipment Needs At Discharge: front wheeled walker  Planned Therapy Interventions: balance training, bed mobility training, gait training, home exercise program, patient/family education, strengthening, transfer training  PT Frequency: 3-5 times/wk, per priority policy             Outcome Measures       11/24/17 1500 11/22/17 1500       How much help from another person do you currently need...    Turning from your back to your side while in flat bed without using bedrails? 3  -RF 3  -BC     Moving from lying on back to sitting on the side of a flat bed without bedrails? 2  -RF 2  -BC     Moving to and from a bed to a chair (including a wheelchair)? 3  -RF 3  -BC     Standing up from a chair using your arms (e.g., wheelchair, bedside chair)? 3  -RF 3  -BC     Climbing 3-5 steps with a railing? 2  -RF 2  -BC     To walk in hospital room? 3  -RF 3  -BC     AM-PAC 6 Clicks Score 16  -RF 16  -BC     Functional Assessment    Outcome Measure Options AM-PAC 6 Clicks Basic Mobility (PT)  -RF AM-PAC 6 Clicks Basic Mobility (PT)  -BC       User Key  (r) = Recorded By, (t) = Taken By, (c) = Cosigned By    Initials Name Provider Type    BC Adele Cardona, PT Physical  Therapist    RF Alley Dubon PTA Physical Therapy Assistant           Time Calculation:         PT Charges       11/24/17 1511          Time Calculation    PT Received On 11/24/17  -RF      PT - Next Appointment 11/27/17  -RF      PT Goal Re-Cert Due Date 12/06/17  -RF      Time Calculation- PT    Total Timed Code Minutes- PT 39 minute(s)  -RF        User Key  (r) = Recorded By, (t) = Taken By, (c) = Cosigned By    Initials Name Provider Type    RF Alley Dubon PTA Physical Therapy Assistant          Therapy Charges for Today     Code Description Service Date Service Provider Modifiers Qty    90551729496 HC PT THER PROC EA 15 MIN 11/24/2017 Alley Dubon, GWENDOLYN GP 1    41693412158 HC PT THERAPEUTIC ACT EA 15 MIN 11/24/2017 Alley Dubon PTA GP 1    51762318086 HC GAIT TRAINING EA 15 MIN 11/24/2017 Alley Dubon PTA GP 1    18017421850 HC PT THER SUPP EA 15 MIN 11/24/2017 Alley Dubon PTA GP 3          PT G-Codes  Outcome Measure Options: AM-PAC 6 Clicks Basic Mobility (PT)  Score: 16  Functional Limitation: Mobility: Walking and moving around  Mobility: Walking and Moving Around Current Status (): At least 40 percent but less than 60 percent impaired, limited or restricted  Mobility: Walking and Moving Around Goal Status (): At least 20 percent but less than 40 percent impaired, limited or restricted    Alley Dubon PTA  11/24/2017

## 2017-11-25 ENCOUNTER — APPOINTMENT (OUTPATIENT)
Dept: GENERAL RADIOLOGY | Facility: HOSPITAL | Age: 77
End: 2017-11-25

## 2017-11-25 ENCOUNTER — APPOINTMENT (OUTPATIENT)
Dept: CT IMAGING | Facility: HOSPITAL | Age: 77
End: 2017-11-25

## 2017-11-25 LAB
ANION GAP SERPL CALCULATED.3IONS-SCNC: 6.6 MMOL/L (ref 3.6–11.2)
BASOPHILS # BLD AUTO: 0 10*3/MM3 (ref 0–0.3)
BASOPHILS NFR BLD AUTO: 0 % (ref 0–2)
BUN BLD-MCNC: 36 MG/DL (ref 7–21)
BUN/CREAT SERPL: 41.9 (ref 7–25)
CALCIUM SPEC-SCNC: 9.4 MG/DL (ref 7.7–10)
CHLORIDE SERPL-SCNC: 110 MMOL/L (ref 99–112)
CO2 SERPL-SCNC: 25.4 MMOL/L (ref 24.3–31.9)
CREAT BLD-MCNC: 0.86 MG/DL (ref 0.43–1.29)
CRP SERPL-MCNC: 9.66 MG/DL (ref 0–0.99)
DEPRECATED RDW RBC AUTO: 47.2 FL (ref 37–54)
EOSINOPHIL # BLD AUTO: 0.01 10*3/MM3 (ref 0–0.7)
EOSINOPHIL NFR BLD AUTO: 0.1 % (ref 0–7)
ERYTHROCYTE [DISTWIDTH] IN BLOOD BY AUTOMATED COUNT: 15.2 % (ref 11.5–14.5)
GFR SERPL CREATININE-BSD FRML MDRD: 64 ML/MIN/1.73
GLUCOSE BLD-MCNC: 100 MG/DL (ref 70–110)
HCT VFR BLD AUTO: 32 % (ref 37–47)
HGB BLD-MCNC: 9.6 G/DL (ref 12–16)
IMM GRANULOCYTES # BLD: 0.02 10*3/MM3 (ref 0–0.03)
IMM GRANULOCYTES NFR BLD: 0.2 % (ref 0–0.5)
LYMPHOCYTES # BLD AUTO: 0.84 10*3/MM3 (ref 1–3)
LYMPHOCYTES NFR BLD AUTO: 8.5 % (ref 16–46)
MAGNESIUM SERPL-MCNC: 2.6 MG/DL (ref 1.7–2.6)
MCH RBC QN AUTO: 26.1 PG (ref 27–33)
MCHC RBC AUTO-ENTMCNC: 30 G/DL (ref 33–37)
MCV RBC AUTO: 87 FL (ref 80–94)
MONOCYTES # BLD AUTO: 0.98 10*3/MM3 (ref 0.1–0.9)
MONOCYTES NFR BLD AUTO: 9.9 % (ref 0–12)
NEUTROPHILS # BLD AUTO: 8.04 10*3/MM3 (ref 1.4–6.5)
NEUTROPHILS NFR BLD AUTO: 81.3 % (ref 40–75)
OSMOLALITY SERPL CALC.SUM OF ELEC: 291.5 MOSM/KG (ref 273–305)
PHOSPHATE SERPL-MCNC: 4.3 MG/DL (ref 2.7–4.5)
PLATELET # BLD AUTO: 257 10*3/MM3 (ref 130–400)
PMV BLD AUTO: 11.1 FL (ref 6–10)
POTASSIUM BLD-SCNC: 4.7 MMOL/L (ref 3.5–5.3)
RBC # BLD AUTO: 3.68 10*6/MM3 (ref 4.2–5.4)
SODIUM BLD-SCNC: 142 MMOL/L (ref 135–153)
WBC NRBC COR # BLD: 9.89 10*3/MM3 (ref 4.5–12.5)

## 2017-11-25 PROCEDURE — 71010 HC CHEST AP: CPT

## 2017-11-25 PROCEDURE — 94799 UNLISTED PULMONARY SVC/PX: CPT

## 2017-11-25 PROCEDURE — 71250 CT THORAX DX C-: CPT | Performed by: RADIOLOGY

## 2017-11-25 PROCEDURE — 71250 CT THORAX DX C-: CPT

## 2017-11-25 PROCEDURE — 25010000002 CEFTRIAXONE: Performed by: HOSPITALIST

## 2017-11-25 PROCEDURE — 80048 BASIC METABOLIC PNL TOTAL CA: CPT | Performed by: HOSPITALIST

## 2017-11-25 PROCEDURE — 99232 SBSQ HOSP IP/OBS MODERATE 35: CPT | Performed by: HOSPITALIST

## 2017-11-25 PROCEDURE — 86140 C-REACTIVE PROTEIN: CPT | Performed by: PHYSICIAN ASSISTANT

## 2017-11-25 PROCEDURE — 85025 COMPLETE CBC W/AUTO DIFF WBC: CPT | Performed by: HOSPITALIST

## 2017-11-25 PROCEDURE — 84100 ASSAY OF PHOSPHORUS: CPT | Performed by: PHYSICIAN ASSISTANT

## 2017-11-25 PROCEDURE — 83735 ASSAY OF MAGNESIUM: CPT | Performed by: PHYSICIAN ASSISTANT

## 2017-11-25 PROCEDURE — 71010 XR CHEST AP: CPT | Performed by: RADIOLOGY

## 2017-11-25 PROCEDURE — 63710000001 PREDNISONE PER 1 MG: Performed by: HOSPITALIST

## 2017-11-25 RX ORDER — BUDESONIDE AND FORMOTEROL FUMARATE DIHYDRATE 80; 4.5 UG/1; UG/1
2 AEROSOL RESPIRATORY (INHALATION)
Status: DISCONTINUED | OUTPATIENT
Start: 2017-11-25 | End: 2017-11-29 | Stop reason: HOSPADM

## 2017-11-25 RX ADMIN — APIXABAN 5 MG: 5 TABLET, FILM COATED ORAL at 08:11

## 2017-11-25 RX ADMIN — IPRATROPIUM BROMIDE AND ALBUTEROL SULFATE 3 ML: .5; 3 SOLUTION RESPIRATORY (INHALATION) at 06:15

## 2017-11-25 RX ADMIN — PREDNISONE 20 MG: 20 TABLET ORAL at 08:11

## 2017-11-25 RX ADMIN — FLECAINIDE ACETATE 50 MG: 50 TABLET ORAL at 21:17

## 2017-11-25 RX ADMIN — DOXYCYCLINE 100 MG: 100 INJECTION, POWDER, LYOPHILIZED, FOR SOLUTION INTRAVENOUS at 04:56

## 2017-11-25 RX ADMIN — IPRATROPIUM BROMIDE AND ALBUTEROL SULFATE 3 ML: .5; 3 SOLUTION RESPIRATORY (INHALATION) at 18:30

## 2017-11-25 RX ADMIN — FLUOXETINE HYDROCHLORIDE 20 MG: 20 CAPSULE ORAL at 21:17

## 2017-11-25 RX ADMIN — BUDESONIDE AND FORMOTEROL FUMARATE DIHYDRATE 2 PUFF: 80; 4.5 AEROSOL RESPIRATORY (INHALATION) at 18:30

## 2017-11-25 RX ADMIN — APIXABAN 5 MG: 5 TABLET, FILM COATED ORAL at 21:17

## 2017-11-25 RX ADMIN — FLECAINIDE ACETATE 50 MG: 50 TABLET ORAL at 08:11

## 2017-11-25 RX ADMIN — FERROUS SULFATE TAB 325 MG (65 MG ELEMENTAL FE) 325 MG: 325 (65 FE) TAB at 14:19

## 2017-11-25 RX ADMIN — THYROID, PORCINE 60 MG: 60 TABLET ORAL at 04:54

## 2017-11-25 RX ADMIN — FLUOXETINE HYDROCHLORIDE 20 MG: 20 CAPSULE ORAL at 08:11

## 2017-11-25 RX ADMIN — CLONAZEPAM 0.5 MG: 0.5 TABLET ORAL at 21:17

## 2017-11-25 RX ADMIN — Medication 1 CAPSULE: at 08:11

## 2017-11-25 RX ADMIN — CEFTRIAXONE 1 G: 1 INJECTION, POWDER, FOR SOLUTION INTRAMUSCULAR; INTRAVENOUS at 17:55

## 2017-11-25 RX ADMIN — DOXYCYCLINE 100 MG: 100 INJECTION, POWDER, LYOPHILIZED, FOR SOLUTION INTRAVENOUS at 17:55

## 2017-11-25 RX ADMIN — POTASSIUM CHLORIDE 10 MEQ: 750 TABLET, FILM COATED, EXTENDED RELEASE ORAL at 08:11

## 2017-11-25 NOTE — NURSING NOTE
Grandson at bedside at present time. Informed him of pt being transferred to [facility].

## 2017-11-25 NOTE — PROGRESS NOTES
[facility] HOSPITALIST PROGRESS NOTE     Patient Identification:  Name:  Cari Harper  Age:  77 y.o.  Sex:  female  :  1940  MRN:  [id number]  Visit Number:  [id number]  Primary Care Provider:  ALYSA Marrero    Length of stay:  3    Subjective:  Patient is in bed, she seems to be more short of breath than yesterday with minimal exertion, no reports of improvement with her respiratory status for the past 2 days.  She is diuresing well.  Repeat x-ray shows more prominence of diffuse infiltrate, left perihilar infiltrate seems to have nodularity or lymphadenopathy.  ----------------------------------------------------------------------------------------------------------------------  Current Hospital Meds:    apixaban 5 mg Oral Q12H   budesonide-formoterol 2 puff Inhalation BID - RT   ceftriaxone 1 g Intravenous Q24H   clonazePAM 0.5 mg Oral Nightly   doxycycline 100 mg Intravenous Q12H   ferrous sulfate 325 mg Oral Q PM   flecainide 50 mg Oral Q12H   FLUoxetine 20 mg Oral Q12H   ipratropium-albuterol 3 mL Nebulization BID   potassium chloride 10 mEq Oral Daily   predniSONE 20 mg Oral Daily   Risaquad-2 1 capsule Oral Daily   Thyroid 60 mg Oral QAM        ----------------------------------------------------------------------------------------------------------------------  Vital Signs:  Temp:  [97.7 °F (36.5 °C)-98.9 °F (37.2 °C)] 98.9 °F (37.2 °C)  Heart Rate:  [54-93] 93  Resp:  [18-22] 22  BP: (119-155)/(46-60) 155/60         Last 3 weights    17  0341 17  0309 17  0338   Weight: 228 lb 12.8 oz (104 kg) 198 lb 3.2 oz (89.9 kg) 191 lb 4.8 oz (86.8 kg)     Body mass index is 36.15 kg/(m^2).    Intake/Output Summary (Last 24 hours) at 17 1024  Last data filed at 17 0338   Gross per 24 hour   Intake              240 ml   Output             1200 ml   Net             -960 ml     Diet Regular;  Cardiac  ----------------------------------------------------------------------------------------------------------------------  Physical exam:  General: Comfortable,awake, alert, oriented to self, place, and time, well-developed and well-nourished.  No respiratory distress.    Skin:  Skin is warm and dry. No rash noted. No pallor.    HENT:  Head:  Normocephalic and atraumatic.  Mouth:  Moist mucous membranes.    Eyes:  Conjunctivae and EOM are normal.  Pupils are equal, round, and reactive to light.  No scleral icterus.    Neck:  Neck supple.  No JVD present.  No hepatojugular reflux   Pulmonary/Chest:  No respiratory distress, no wheezes, rare crackles on the right mid and lower lung fields   Cardiovascular:  Normal rate, regular rhythm and normal heart sounds with no murmur.  Abdominal:  Soft.  Bowel sounds are normal.  No distension and no tenderness.   Extremities:  No edema, no tenderness, and no deformity.  No red or swollen joints anywhere.  Strong pulses in all 4 extremities with no clubbing, no cyanosis, no edema.  Neurological:  Motor strength equal no obvious deficit, sensory grossly intact.   No cranial nerve deficit.  No tongue deviation.  No facial droop.  No slurred speech.      ----------------------------------------------------------------------------------------------------------------------  ----------------------------------------------------------------------------------------------------------------------    Results from last 7 days  Lab Units 11/24/17  0510 11/22/17  0025 11/21/17  2221   TROPONIN I ng/mL 0.038 0.023 0.016       Results from last 7 days  Lab Units 11/25/17  0834 11/25/17  0130 11/24/17  0510 11/22/17  0405 11/22/17  0231 11/21/17  2221   CRP mg/dL 9.66*  --  9.40* 12.45*  --  12.40*   LACTATE mmol/L  --   --   --   --   --  0.9   WBC 10*3/mm3  --  9.89 10.25  --  9.08 9.12   HEMOGLOBIN g/dL  --  9.6* 9.6*  --  9.7* 10.5*   HEMATOCRIT %  --  32.0* 31.2*  --  31.7* 33.4*   MCV  fL  --  87.0 85.7  --  85.7 83.7   MCHC g/dL  --  30.0* 30.8*  --  30.6* 31.4*   PLATELETS 10*3/mm3  --  257 254  --  242 267   INR   --   --   --   --   --  1.43*       Results from last 7 days  Lab Units 11/21/17  2216   PH, ARTERIAL pH units 7.516*   PO2 ART mm Hg 45.4*   PCO2, ARTERIAL mm Hg 32.6*   HCO3 ART mmol/L 25.8       Results from last 7 days  Lab Units 11/25/17  0130 11/24/17  0510 11/22/17  0231 11/21/17  2221   SODIUM mmol/L 142 141 139 137   POTASSIUM mmol/L 4.7 4.6 3.2* 3.5   MAGNESIUM mg/dL 2.6 2.5  --   --    CHLORIDE mmol/L 110 110 105 101   CO2 mmol/L 25.4 25.2 21.7* 27.9   BUN mg/dL 36* 32* 21 23*   CREATININE mg/dL 0.86 0.75 1.17 1.20   EGFR IF NONAFRICN AM mL/min/1.73 64 75 45* 43*   CALCIUM mg/dL 9.4 9.1 8.4 9.2   GLUCOSE mg/dL 100 116* 166* 118*   ALBUMIN g/dL  --   --  3.90 4.10   BILIRUBIN mg/dL  --   --  0.7 0.8   ALK PHOS U/L  --   --  174* 175*   AST (SGOT) U/L  --   --  23 29   ALT (SGPT) U/L  --   --  11 21   Estimated Creatinine Clearance: 54 mL/min (by C-G formula based on Cr of 0.86).    No results found for: AMMONIA      Blood Culture   Date Value Ref Range Status   11/21/2017 No growth at 3 days  Preliminary   11/21/2017 No growth at 3 days  Preliminary                I have personally looked at the labs and they are summarized above.  ----------------------------------------------------------------------------------------------------------------------  Imaging Results (last 24 hours)     Procedure Component Value Units Date/Time    XR Chest PA & Lateral [[id number]] Collected:  11/24/17 1026     Updated:  11/24/17 1029    Narrative:       EXAMINATION: XR CHEST PA AND LATERAL-      CLINICAL INDICATION: chf and pneumonia; J12.9-Viral pneumonia,  unspecified; I48.2-Chronic atrial fibrillation          COMPARISON: 11/21/2017      TECHNIQUE: XR CHEST PA AND LATERAL-      FINDINGS:   Increased interstitial markings and superimposed airspace disease in the  upper lobes bilaterally    Heart and mediastinal contours are unremarkable.   No pneumothorax.   Bony and soft tissue structures are unremarkable.            Impression:       Diffuse interstitial lung disease and probable superimposed  bronchopneumonia in the upper lobes     This report was finalized on 11/24/2017 10:27 AM by Dr. Simón Stone MD.       XR Chest AP [[id number]] Updated:  11/25/17 0724            ----------------------------------------------------------------------------------------------------------------------  Assessment and Plan:    - Bilateral pneumonia    - Acute hypoxic course of failure secondary to pneumonia and acute diastolic heart failure on top of chronic    - Acute diastolic heart failure on top of chronic now euvolemic    - Severe pulmonary hypertension on 2-D echo.    Continue current treatment, we'll get a CT scan of the chest for further evaluation of infiltrate especially in the left perihilar where it seems to be more nodular in characteristic but is difficult to assess because of the cardiac silhouette.    Plan has been outlined and discussed with the patient and 2 sons at bedside and agreed with plan of care.    Morena Sarah MD  11/25/17  10:24 AM

## 2017-11-25 NOTE — NURSING NOTE
While ambulating patient to bathroom without oxygen her sat dropped to 82%. O2 placed on patient at 2L and saturation improved to 94%. Ambulated pt back to bed with o2 on and no soa observed. She is resting in bed at this time with no SOA voiced and O2 @ 2l n/c in place. Will continue to monitor.

## 2017-11-26 LAB
ANION GAP SERPL CALCULATED.3IONS-SCNC: 6.6 MMOL/L (ref 3.6–11.2)
BACTERIA SPEC AEROBE CULT: NORMAL
BACTERIA SPEC AEROBE CULT: NORMAL
BASOPHILS # BLD AUTO: 0 10*3/MM3 (ref 0–0.3)
BASOPHILS NFR BLD AUTO: 0 % (ref 0–2)
BUN BLD-MCNC: 36 MG/DL (ref 7–21)
BUN/CREAT SERPL: 43.4 (ref 7–25)
CALCIUM SPEC-SCNC: 8.7 MG/DL (ref 7.7–10)
CHLORIDE SERPL-SCNC: 109 MMOL/L (ref 99–112)
CO2 SERPL-SCNC: 25.4 MMOL/L (ref 24.3–31.9)
CREAT BLD-MCNC: 0.83 MG/DL (ref 0.43–1.29)
CRP SERPL-MCNC: 12.24 MG/DL (ref 0–0.99)
DEPRECATED RDW RBC AUTO: 46.8 FL (ref 37–54)
EOSINOPHIL # BLD AUTO: 0.04 10*3/MM3 (ref 0–0.7)
EOSINOPHIL NFR BLD AUTO: 0.4 % (ref 0–7)
ERYTHROCYTE [DISTWIDTH] IN BLOOD BY AUTOMATED COUNT: 15.1 % (ref 11.5–14.5)
GFR SERPL CREATININE-BSD FRML MDRD: 66 ML/MIN/1.73
GLUCOSE BLD-MCNC: 109 MG/DL (ref 70–110)
HCT VFR BLD AUTO: 30.5 % (ref 37–47)
HGB BLD-MCNC: 9.1 G/DL (ref 12–16)
IMM GRANULOCYTES # BLD: 0.04 10*3/MM3 (ref 0–0.03)
IMM GRANULOCYTES NFR BLD: 0.4 % (ref 0–0.5)
LYMPHOCYTES # BLD AUTO: 0.7 10*3/MM3 (ref 1–3)
LYMPHOCYTES NFR BLD AUTO: 7.7 % (ref 16–46)
MAGNESIUM SERPL-MCNC: 2.4 MG/DL (ref 1.7–2.6)
MCH RBC QN AUTO: 25.9 PG (ref 27–33)
MCHC RBC AUTO-ENTMCNC: 29.8 G/DL (ref 33–37)
MCV RBC AUTO: 86.9 FL (ref 80–94)
MONOCYTES # BLD AUTO: 0.95 10*3/MM3 (ref 0.1–0.9)
MONOCYTES NFR BLD AUTO: 10.5 % (ref 0–12)
NEUTROPHILS # BLD AUTO: 7.36 10*3/MM3 (ref 1.4–6.5)
NEUTROPHILS NFR BLD AUTO: 81 % (ref 40–75)
OSMOLALITY SERPL CALC.SUM OF ELEC: 290.2 MOSM/KG (ref 273–305)
PHOSPHATE SERPL-MCNC: 4.6 MG/DL (ref 2.7–4.5)
PLATELET # BLD AUTO: 242 10*3/MM3 (ref 130–400)
PMV BLD AUTO: 11 FL (ref 6–10)
POTASSIUM BLD-SCNC: 4.2 MMOL/L (ref 3.5–5.3)
RBC # BLD AUTO: 3.51 10*6/MM3 (ref 4.2–5.4)
SODIUM BLD-SCNC: 141 MMOL/L (ref 135–153)
WBC NRBC COR # BLD: 9.09 10*3/MM3 (ref 4.5–12.5)

## 2017-11-26 PROCEDURE — 83735 ASSAY OF MAGNESIUM: CPT | Performed by: PHYSICIAN ASSISTANT

## 2017-11-26 PROCEDURE — 94799 UNLISTED PULMONARY SVC/PX: CPT

## 2017-11-26 PROCEDURE — 63710000001 PREDNISONE PER 1 MG: Performed by: HOSPITALIST

## 2017-11-26 PROCEDURE — 84100 ASSAY OF PHOSPHORUS: CPT | Performed by: PHYSICIAN ASSISTANT

## 2017-11-26 PROCEDURE — 85025 COMPLETE CBC W/AUTO DIFF WBC: CPT | Performed by: PHYSICIAN ASSISTANT

## 2017-11-26 PROCEDURE — 86140 C-REACTIVE PROTEIN: CPT | Performed by: PHYSICIAN ASSISTANT

## 2017-11-26 PROCEDURE — 99232 SBSQ HOSP IP/OBS MODERATE 35: CPT | Performed by: HOSPITALIST

## 2017-11-26 PROCEDURE — 80048 BASIC METABOLIC PNL TOTAL CA: CPT | Performed by: PHYSICIAN ASSISTANT

## 2017-11-26 RX ADMIN — FLECAINIDE ACETATE 50 MG: 50 TABLET ORAL at 20:15

## 2017-11-26 RX ADMIN — POTASSIUM CHLORIDE 10 MEQ: 750 TABLET, FILM COATED, EXTENDED RELEASE ORAL at 08:32

## 2017-11-26 RX ADMIN — DOXYCYCLINE 100 MG: 100 INJECTION, POWDER, LYOPHILIZED, FOR SOLUTION INTRAVENOUS at 17:27

## 2017-11-26 RX ADMIN — FLUOXETINE HYDROCHLORIDE 20 MG: 20 CAPSULE ORAL at 08:32

## 2017-11-26 RX ADMIN — FLUOXETINE HYDROCHLORIDE 20 MG: 20 CAPSULE ORAL at 20:15

## 2017-11-26 RX ADMIN — BUDESONIDE AND FORMOTEROL FUMARATE DIHYDRATE 2 PUFF: 80; 4.5 AEROSOL RESPIRATORY (INHALATION) at 18:31

## 2017-11-26 RX ADMIN — PIPERACILLIN SODIUM,TAZOBACTAM SODIUM 3.38 G: 3; .375 INJECTION, POWDER, FOR SOLUTION INTRAVENOUS at 20:14

## 2017-11-26 RX ADMIN — FLECAINIDE ACETATE 50 MG: 50 TABLET ORAL at 08:32

## 2017-11-26 RX ADMIN — FERROUS SULFATE TAB 325 MG (65 MG ELEMENTAL FE) 325 MG: 325 (65 FE) TAB at 13:53

## 2017-11-26 RX ADMIN — PREDNISONE 20 MG: 20 TABLET ORAL at 08:32

## 2017-11-26 RX ADMIN — APIXABAN 5 MG: 5 TABLET, FILM COATED ORAL at 20:15

## 2017-11-26 RX ADMIN — PIPERACILLIN SODIUM,TAZOBACTAM SODIUM 3.38 G: 3; .375 INJECTION, POWDER, FOR SOLUTION INTRAVENOUS at 13:11

## 2017-11-26 RX ADMIN — THYROID, PORCINE 60 MG: 60 TABLET ORAL at 05:31

## 2017-11-26 RX ADMIN — DOXYCYCLINE 100 MG: 100 INJECTION, POWDER, LYOPHILIZED, FOR SOLUTION INTRAVENOUS at 05:31

## 2017-11-26 RX ADMIN — CLONAZEPAM 0.5 MG: 0.5 TABLET ORAL at 20:15

## 2017-11-26 RX ADMIN — BUDESONIDE AND FORMOTEROL FUMARATE DIHYDRATE 2 PUFF: 80; 4.5 AEROSOL RESPIRATORY (INHALATION) at 06:22

## 2017-11-26 RX ADMIN — APIXABAN 5 MG: 5 TABLET, FILM COATED ORAL at 08:32

## 2017-11-26 RX ADMIN — IPRATROPIUM BROMIDE AND ALBUTEROL SULFATE 3 ML: .5; 3 SOLUTION RESPIRATORY (INHALATION) at 18:31

## 2017-11-26 RX ADMIN — Medication 1 CAPSULE: at 08:32

## 2017-11-26 RX ADMIN — IPRATROPIUM BROMIDE AND ALBUTEROL SULFATE 3 ML: .5; 3 SOLUTION RESPIRATORY (INHALATION) at 06:23

## 2017-11-26 NOTE — PLAN OF CARE
Problem: Pneumonia (Adult)  Goal: Signs and Symptoms of Listed Potential Problems Will be Absent or Manageable (Pneumonia)  Outcome: Ongoing (interventions implemented as appropriate)    11/26/17 0008   Pneumonia   Problems Assessed (Pneumonia) all   Problems Present (Pneumonia) none

## 2017-11-26 NOTE — PLAN OF CARE
Problem: Fall Risk (Adult)  Goal: Absence of Falls  Outcome: Ongoing (interventions implemented as appropriate)    11/26/17 0008   Fall Risk (Adult)   Absence of Falls making progress toward outcome

## 2017-11-26 NOTE — PLAN OF CARE
Problem: Pressure Ulcer Risk (Kerwin Scale) (Adult,Obstetrics,Pediatric)  Goal: Skin Integrity  Outcome: Ongoing (interventions implemented as appropriate)    11/26/17 0007   Pressure Ulcer Risk (Kerwin Scale) (Adult,Obstetrics,Pediatric)   Skin Integrity making progress toward outcome

## 2017-11-26 NOTE — PROGRESS NOTES
[facility] HOSPITALIST PROGRESS NOTE     Patient Identification:  Name:  Cari Harper  Age:  77 y.o.  Sex:  female  :  1940  MRN:  [id number]  Visit Number:  [id number]  Primary Care Provider:  ALYSA Marrero    Length of stay:  4    Subjective:  Patient currently is in bed in reclined position she is not short of breath unless with exertion, she is afebrile, diuresing well and had bowel movement.  CT scan of chest was ordered yesterday because of worsening of infiltrate and no good clinical improvement, result shows extensive interstitial lung disease and infiltrate bilaterally more in the right.  Patient reports her coughing is more productive now which I think we can order another sputum culture.  CRP is increasing  ----------------------------------------------------------------------------------------------------------------------  Current Hospital Meds:    apixaban 5 mg Oral Q12H   budesonide-formoterol 2 puff Inhalation BID - RT   ceftriaxone 1 g Intravenous Q24H   clonazePAM 0.5 mg Oral Nightly   doxycycline 100 mg Intravenous Q12H   ferrous sulfate 325 mg Oral Q PM   flecainide 50 mg Oral Q12H   FLUoxetine 20 mg Oral Q12H   ipratropium-albuterol 3 mL Nebulization BID   potassium chloride 10 mEq Oral Daily   predniSONE 20 mg Oral Daily   Risaquad-2 1 capsule Oral Daily   Thyroid 60 mg Oral QAM        ----------------------------------------------------------------------------------------------------------------------  Vital Signs:  Temp:  [98 °F (36.7 °C)-98.5 °F (36.9 °C)] 98.2 °F (36.8 °C)  Heart Rate:  [53-70] 62  Resp:  [18-22] 20  BP: (123-138)/(44-71) 135/44       Tele:   Last 3 weights    17  0341 17  0309 17  0338   Weight: 228 lb 12.8 oz (104 kg) 198 lb 3.2 oz (89.9 kg) 191 lb 4.8 oz (86.8 kg)     Body mass index is 36.15 kg/(m^2).    Intake/Output Summary (Last 24 hours) at 17 1043  Last data filed at 17 0900   Gross per 24 hour    Intake              700 ml   Output             1150 ml   Net             -450 ml     Diet Regular; Cardiac  ----------------------------------------------------------------------------------------------------------------------  Physical exam:  General: Comfortable,awake, alert, oriented to self, place, and time, well-developed and well-nourished.  No respiratory distress.    Skin:  Skin is warm and dry. No rash noted. No pallor.    HENT:  Head:  Normocephalic and atraumatic.  Mouth:  Moist mucous membranes.    Eyes:  Conjunctivae and EOM are normal.  Pupils are equal, round, and reactive to light.  No scleral icterus.    Neck:  Neck supple.  No JVD present.    Pulmonary/Chest:  No respiratory distress, no more wheezes, scattered crackles both lung fields more on the right mid areas, with good air movement.  Cardiovascular:  Normal rate, regular rhythm and normal heart sounds with no murmur.  Abdominal:  Soft.  Bowel sounds are normal.  No distension and no tenderness.   Extremities:  No edema, no tenderness, and no deformity.  No red or swollen joints anywhere.  Strong pulses in all 4 extremities with no clubbing, no cyanosis, no edema.  Neurological:  Motor strength equal no obvious deficit, sensory grossly intact.   No cranial nerve deficit.  No tongue deviation.  No facial droop.  No slurred speech.      ----------------------------------------------------------------------------------------------------------------------  ----------------------------------------------------------------------------------------------------------------------    Results from last 7 days  Lab Units 11/24/17  0510 11/22/17  0025 11/21/17  2221   TROPONIN I ng/mL 0.038 0.023 0.016       Results from last 7 days  Lab Units 11/26/17  0122 11/25/17  0834 11/25/17  0130 11/24/17  0510  11/21/17  2221   CRP mg/dL 12.24* 9.66*  --  9.40*  < > 12.40*   LACTATE mmol/L  --   --   --   --   --  0.9   WBC 10*3/mm3 9.09  --  9.89 10.25  < > 9.12    HEMOGLOBIN g/dL 9.1*  --  9.6* 9.6*  < > 10.5*   HEMATOCRIT % 30.5*  --  32.0* 31.2*  < > 33.4*   MCV fL 86.9  --  87.0 85.7  < > 83.7   MCHC g/dL 29.8*  --  30.0* 30.8*  < > 31.4*   PLATELETS 10*3/mm3 242  --  257 254  < > 267   INR   --   --   --   --   --  1.43*   < > = values in this interval not displayed.    Results from last 7 days  Lab Units 11/21/17  2216   PH, ARTERIAL pH units 7.516*   PO2 ART mm Hg 45.4*   PCO2, ARTERIAL mm Hg 32.6*   HCO3 ART mmol/L 25.8       Results from last 7 days  Lab Units 11/26/17  0122 11/25/17  0130 11/24/17  0510 11/22/17  0231 11/21/17  2221   SODIUM mmol/L 141 142 141 139 137   POTASSIUM mmol/L 4.2 4.7 4.6 3.2* 3.5   MAGNESIUM mg/dL 2.4 2.6 2.5  --   --    CHLORIDE mmol/L 109 110 110 105 101   CO2 mmol/L 25.4 25.4 25.2 21.7* 27.9   BUN mg/dL 36* 36* 32* 21 23*   CREATININE mg/dL 0.83 0.86 0.75 1.17 1.20   EGFR IF NONAFRICN AM mL/min/1.73 66 64 75 45* 43*   CALCIUM mg/dL 8.7 9.4 9.1 8.4 9.2   GLUCOSE mg/dL 109 100 116* 166* 118*   ALBUMIN g/dL  --   --   --  3.90 4.10   BILIRUBIN mg/dL  --   --   --  0.7 0.8   ALK PHOS U/L  --   --   --  174* 175*   AST (SGOT) U/L  --   --   --  23 29   ALT (SGPT) U/L  --   --   --  11 21   Estimated Creatinine Clearance: 55.9 mL/min (by C-G formula based on Cr of 0.83).    No results found for: AMMONIA      Blood Culture   Date Value Ref Range Status   11/21/2017 No growth at 4 days  Preliminary   11/21/2017 No growth at 4 days  Preliminary                I have personally looked at the labs and they are summarized above.  ----------------------------------------------------------------------------------------------------------------------  Imaging Results (last 24 hours)     Procedure Component Value Units Date/Time    XR Chest AP [[id number]] Collected:  11/25/17 1042     Updated:  11/25/17 1045    Narrative:       XR CHEST AP-     CLINICAL INDICATION: pneumonia; J12.9-Viral pneumonia, unspecified;  I48.2-Chronic atrial fibrillation           COMPARISON: 11/23/2017      TECHNIQUE: Single frontal view of the chest.     FINDINGS:     Diffuse interstitial lung disease  Consolidation in the right upper and to a lesser degree left upper lobe  There is no evidence of an acute osseous abnormality.   There are no suspicious-appearing parenchymal soft tissue nodules.            Impression:       Overall appearance is worse than on the previous exam         This report was finalized on 11/25/2017 10:43 AM by Dr. Simón Stone MD.       CT Chest Without Contrast [[id number]] Collected:  11/26/17 1013     Updated:  11/26/17 1028    Narrative:       CT CHEST WITHOUT CONTRAST-      CLINICAL INDICATION: Respiratory failure; J12.9-Viral pneumonia,  unspecified; I48.2-Chronic atrial fibrillation.          DOSE:      COMPARISON: Chest radiograph dated 11/25/2017.     Radiation dose reduction techniques were utilized per ALARA protocol.  Automated exposure control was initiated through either or InflowControl or  DoseRight software packages by  protocol.           PROCEDURE: Axial images were acquired from the thoracic inlet through  the upper abdomen without any IV contrast. Reformatted images were  created.     FINDINGS: There is trace right pleural effusion.     There are a couple of enlarged lymph nodes in the mediastinum.     The lung window setting images show extensive interstitial lung disease  in the upper lobes bilaterally, worse on the right than on the left.  This probably represents an atypical interstitial pneumonia. It has an  atypical appearance for edema.        Impression:       Extensive interstitial lung disease in the upper lobes  bilaterally most likely representing pneumonia. It is worse on the right  than on the left. Trace right effusion is present.     This report was finalized on 11/26/2017 10:26 AM by Dr. Simón Stone MD.            ----------------------------------------------------------------------------------------------------------------------  Assessment and Plan:    - Bilateral multifocal pneumonia with a typical pattern, no significant clinical improvement with current treatment as ordered and resolution of the bronchospasm.    - Acute diastolic heart failure on top of chronic now euvolemic.    - Severe pulmonary hypertension on 2-D echo.    - COPD exacerbation, bronchospasm has resolved    Will change her Rocephin to Zosyn, continue doxycycline, will avoid Levaquin especially with her history of cardiac arrhythmia, will reorder sputum culture now that she has productive cough.  If the patient fails to improve in the next few days patient may need bronchoscopy and pulmonary consult.      Morena Sarah MD  11/26/17  10:43 AM

## 2017-11-26 NOTE — PLAN OF CARE
Problem: Pneumonia (Adult)  Goal: Signs and Symptoms of Listed Potential Problems Will be Absent or Manageable (Pneumonia)  Outcome: Ongoing (interventions implemented as appropriate)    Problem: Fall Risk (Adult)  Goal: Absence of Falls  Outcome: Ongoing (interventions implemented as appropriate)    Problem: Pressure Ulcer Risk (Kerwin Scale) (Adult,Obstetrics,Pediatric)  Goal: Skin Integrity  Outcome: Ongoing (interventions implemented as appropriate)

## 2017-11-27 LAB
ANION GAP SERPL CALCULATED.3IONS-SCNC: 8.4 MMOL/L (ref 3.6–11.2)
BASOPHILS # BLD AUTO: 0 10*3/MM3 (ref 0–0.3)
BASOPHILS NFR BLD AUTO: 0 % (ref 0–2)
BUN BLD-MCNC: 29 MG/DL (ref 7–21)
BUN/CREAT SERPL: 42 (ref 7–25)
CALCIUM SPEC-SCNC: 8.5 MG/DL (ref 7.7–10)
CHLORIDE SERPL-SCNC: 109 MMOL/L (ref 99–112)
CO2 SERPL-SCNC: 22.6 MMOL/L (ref 24.3–31.9)
CREAT BLD-MCNC: 0.69 MG/DL (ref 0.43–1.29)
CRP SERPL-MCNC: 11.3 MG/DL (ref 0–0.99)
DEPRECATED RDW RBC AUTO: 46.3 FL (ref 37–54)
EOSINOPHIL # BLD AUTO: 0.07 10*3/MM3 (ref 0–0.7)
EOSINOPHIL NFR BLD AUTO: 0.8 % (ref 0–7)
ERYTHROCYTE [DISTWIDTH] IN BLOOD BY AUTOMATED COUNT: 15 % (ref 11.5–14.5)
GFR SERPL CREATININE-BSD FRML MDRD: 82 ML/MIN/1.73
GLUCOSE BLD-MCNC: 107 MG/DL (ref 70–110)
HCT VFR BLD AUTO: 29.9 % (ref 37–47)
HGB BLD-MCNC: 9 G/DL (ref 12–16)
IMM GRANULOCYTES # BLD: 0.02 10*3/MM3 (ref 0–0.03)
IMM GRANULOCYTES NFR BLD: 0.2 % (ref 0–0.5)
LYMPHOCYTES # BLD AUTO: 0.51 10*3/MM3 (ref 1–3)
LYMPHOCYTES NFR BLD AUTO: 6.2 % (ref 16–46)
MCH RBC QN AUTO: 26.1 PG (ref 27–33)
MCHC RBC AUTO-ENTMCNC: 30.1 G/DL (ref 33–37)
MCV RBC AUTO: 86.7 FL (ref 80–94)
MONOCYTES # BLD AUTO: 0.86 10*3/MM3 (ref 0.1–0.9)
MONOCYTES NFR BLD AUTO: 10.4 % (ref 0–12)
NEUTROPHILS # BLD AUTO: 6.78 10*3/MM3 (ref 1.4–6.5)
NEUTROPHILS NFR BLD AUTO: 82.4 % (ref 40–75)
OSMOLALITY SERPL CALC.SUM OF ELEC: 285.7 MOSM/KG (ref 273–305)
PLATELET # BLD AUTO: 233 10*3/MM3 (ref 130–400)
PMV BLD AUTO: 11 FL (ref 6–10)
POTASSIUM BLD-SCNC: 4.2 MMOL/L (ref 3.5–5.3)
RBC # BLD AUTO: 3.45 10*6/MM3 (ref 4.2–5.4)
SODIUM BLD-SCNC: 140 MMOL/L (ref 135–153)
WBC NRBC COR # BLD: 8.24 10*3/MM3 (ref 4.5–12.5)

## 2017-11-27 PROCEDURE — 94799 UNLISTED PULMONARY SVC/PX: CPT

## 2017-11-27 PROCEDURE — 80048 BASIC METABOLIC PNL TOTAL CA: CPT | Performed by: HOSPITALIST

## 2017-11-27 PROCEDURE — 99233 SBSQ HOSP IP/OBS HIGH 50: CPT | Performed by: INTERNAL MEDICINE

## 2017-11-27 PROCEDURE — 63710000001 PREDNISONE PER 1 MG: Performed by: HOSPITALIST

## 2017-11-27 PROCEDURE — 85025 COMPLETE CBC W/AUTO DIFF WBC: CPT | Performed by: HOSPITALIST

## 2017-11-27 PROCEDURE — 86140 C-REACTIVE PROTEIN: CPT | Performed by: PHYSICIAN ASSISTANT

## 2017-11-27 PROCEDURE — 97110 THERAPEUTIC EXERCISES: CPT

## 2017-11-27 PROCEDURE — 97116 GAIT TRAINING THERAPY: CPT

## 2017-11-27 RX ORDER — BUMETANIDE 1 MG/1
2 TABLET ORAL DAILY
Status: DISCONTINUED | OUTPATIENT
Start: 2017-11-27 | End: 2017-11-29 | Stop reason: HOSPADM

## 2017-11-27 RX ADMIN — CLONAZEPAM 0.5 MG: 0.5 TABLET ORAL at 20:47

## 2017-11-27 RX ADMIN — FLUOXETINE HYDROCHLORIDE 20 MG: 20 CAPSULE ORAL at 08:50

## 2017-11-27 RX ADMIN — BUDESONIDE AND FORMOTEROL FUMARATE DIHYDRATE 2 PUFF: 80; 4.5 AEROSOL RESPIRATORY (INHALATION) at 06:22

## 2017-11-27 RX ADMIN — DOXYCYCLINE 100 MG: 100 INJECTION, POWDER, LYOPHILIZED, FOR SOLUTION INTRAVENOUS at 05:15

## 2017-11-27 RX ADMIN — FLUOXETINE HYDROCHLORIDE 20 MG: 20 CAPSULE ORAL at 20:47

## 2017-11-27 RX ADMIN — FLECAINIDE ACETATE 50 MG: 50 TABLET ORAL at 20:47

## 2017-11-27 RX ADMIN — PIPERACILLIN SODIUM,TAZOBACTAM SODIUM 3.38 G: 3; .375 INJECTION, POWDER, FOR SOLUTION INTRAVENOUS at 02:32

## 2017-11-27 RX ADMIN — PIPERACILLIN SODIUM,TAZOBACTAM SODIUM 3.38 G: 3; .375 INJECTION, POWDER, FOR SOLUTION INTRAVENOUS at 20:46

## 2017-11-27 RX ADMIN — APIXABAN 5 MG: 5 TABLET, FILM COATED ORAL at 08:50

## 2017-11-27 RX ADMIN — FERROUS SULFATE TAB 325 MG (65 MG ELEMENTAL FE) 325 MG: 325 (65 FE) TAB at 15:55

## 2017-11-27 RX ADMIN — PIPERACILLIN SODIUM,TAZOBACTAM SODIUM 3.38 G: 3; .375 INJECTION, POWDER, FOR SOLUTION INTRAVENOUS at 11:14

## 2017-11-27 RX ADMIN — THYROID, PORCINE 60 MG: 60 TABLET ORAL at 05:16

## 2017-11-27 RX ADMIN — BUDESONIDE AND FORMOTEROL FUMARATE DIHYDRATE 2 PUFF: 80; 4.5 AEROSOL RESPIRATORY (INHALATION) at 18:22

## 2017-11-27 RX ADMIN — APIXABAN 5 MG: 5 TABLET, FILM COATED ORAL at 20:47

## 2017-11-27 RX ADMIN — PREDNISONE 20 MG: 20 TABLET ORAL at 08:50

## 2017-11-27 RX ADMIN — BUMETANIDE 2 MG: 1 TABLET ORAL at 18:47

## 2017-11-27 RX ADMIN — FLECAINIDE ACETATE 50 MG: 50 TABLET ORAL at 08:50

## 2017-11-27 RX ADMIN — Medication 1 CAPSULE: at 08:50

## 2017-11-27 RX ADMIN — IPRATROPIUM BROMIDE AND ALBUTEROL SULFATE 3 ML: .5; 3 SOLUTION RESPIRATORY (INHALATION) at 18:22

## 2017-11-27 RX ADMIN — POTASSIUM CHLORIDE 10 MEQ: 750 TABLET, FILM COATED, EXTENDED RELEASE ORAL at 08:50

## 2017-11-27 RX ADMIN — IPRATROPIUM BROMIDE AND ALBUTEROL SULFATE 3 ML: .5; 3 SOLUTION RESPIRATORY (INHALATION) at 06:22

## 2017-11-27 NOTE — PROGRESS NOTES
Subjective     History:   Cari Harper is a 77 y.o. female admitted on 11/21/2017 secondary to Respiratory failure, acute     Procedures: None    Patient seen and examined with RUTHIE Flor. Awake and alert. Currently sitting in bedside chair with her son present at bedside. Continues to report dyspnea with exertion but states she does feel improved from yesterday. Denies CP. Reports only a mild dry cough. Denies fever or chills. No acute events overnight per RN.     History taken from: patient, chart, and RN.      Objective     Vital Signs  Temp:  [97.9 °F (36.6 °C)-98.7 °F (37.1 °C)] 98.7 °F (37.1 °C)  Heart Rate:  [59-80] 59  Resp:  [18-20] 20  BP: (126-143)/(52-61) 142/60    Intake/Output Summary (Last 24 hours) at 11/27/17 1727  Last data filed at 11/27/17 1300   Gross per 24 hour   Intake              780 ml   Output             1200 ml   Net             -420 ml         Physical Exam:  General:    Awake, alert, in no acute distress   Heart:      Normal S1 and S2. Regular rate and rhythm. No significant murmur, rubs or gallops appreciated.   Lungs:     Respirations regular, even and unlabored. Diminished breath sounds throughout. No wheezes appreciated.    Abdomen:   Soft and nontender. No guarding, rebound tenderness or  organomegaly noted. Bowel sounds present x 4.   Extremities:  1+ pitting edema in LE B/L. Moves UE and LE equally B/L.     Results Review:      Results from last 7 days  Lab Units 11/27/17  0040 11/26/17 0122 11/25/17 0130 11/24/17  0510 11/22/17 0231 11/21/17  2221   WBC 10*3/mm3 8.24 9.09 9.89 10.25 9.08 9.12   HEMOGLOBIN g/dL 9.0* 9.1* 9.6* 9.6* 9.7* 10.5*   PLATELETS 10*3/mm3 233 242 257 254 242 267       Results from last 7 days  Lab Units 11/27/17  0040 11/26/17  0122 11/25/17  0130 11/24/17  0510 11/22/17  0231 11/21/17  2221   SODIUM mmol/L 140 141 142 141 139 137   POTASSIUM mmol/L 4.2 4.2 4.7 4.6 3.2* 3.5   CHLORIDE mmol/L 109 109 110 110 105 101   CO2 mmol/L 22.6* 25.4 25.4 25.2  21.7* 27.9   BUN mg/dL 29* 36* 36* 32* 21 23*   CREATININE mg/dL 0.69 0.83 0.86 0.75 1.17 1.20   CALCIUM mg/dL 8.5 8.7 9.4 9.1 8.4 9.2   GLUCOSE mg/dL 107 109 100 116* 166* 118*       Results from last 7 days  Lab Units 11/22/17  0231 11/21/17  2221   BILIRUBIN mg/dL 0.7 0.8   ALK PHOS U/L 174* 175*   AST (SGOT) U/L 23 29   ALT (SGPT) U/L 11 21       Results from last 7 days  Lab Units 11/26/17  0122 11/25/17  0130 11/24/17  0510   MAGNESIUM mg/dL 2.4 2.6 2.5       Results from last 7 days  Lab Units 11/21/17  2221   INR  1.43*       Results from last 7 days  Lab Units 11/24/17  0510 11/22/17  0025 11/21/17  2221   TROPONIN I ng/mL 0.038 0.023 0.016       Imaging Results (last 24 hours)     ** No results found for the last 24 hours. **            Medications:    apixaban 5 mg Oral Q12H   budesonide-formoterol 2 puff Inhalation BID - RT   bumetanide 2 mg Oral Daily   clonazePAM 0.5 mg Oral Nightly   ferrous sulfate 325 mg Oral Q PM   flecainide 50 mg Oral Q12H   FLUoxetine 20 mg Oral Q12H   ipratropium-albuterol 3 mL Nebulization BID   piperacillin-tazobactam 3.375 g Intravenous Q8H   potassium chloride 10 mEq Oral Daily   predniSONE 20 mg Oral Daily   Risaquad-2 1 capsule Oral Daily   Thyroid 60 mg Oral QAM              Assessment/Plan   Acute hypoxic respiratory failure: Likely multifactorial 2/2 bilateral pneumonia, COPD exacerbation, CHF exacerbation and severe pulmonary HTN. Cont antibiotics, steroids nebs and diuretics in addition to supplemental O2.     Severe sepsis: Likely 2/2 bilateral pneumonia. Atypical workup is negative. WBC remains stable. CRP improved today and pt reports improvement in her symptoms. Sputum culture ordered. Cont current antibiotics as they were recently broadened. Repeat labs in the AM.     Acute exacerbation of COPD: Cont treatment as outlined above. PT consulted in setting of deconditioning.      Acute on chronic diastolic CHF: Clinically improving. Restart home dose of Bumex today.  Repeat labs including BNP and CXR in the AM.     Severe pulmonary HTN: Cont treatment as outlined above.     Hx of Afib: Cont current medication regimen. Cont Eliquis for anticoagulation. Cont to monitor on telemetry.     DVT PPX: Eliquis    Pt is at high risk 2/2 respiratory failure, pneumonia, sepsis, CHF exacerbation, COPD exacerbation and advanced age.       Walter Sanchez,   11/27/17  5:27 PM

## 2017-11-27 NOTE — THERAPY TREATMENT NOTE
Acute Care - Physical Therapy Treatment Note   Jesu     Patient Name: Cari Harper  : 1940  MRN: [id number]  Today's Date: 2017  Onset of Illness/Injury or Date of Surgery Date: 17  Date of Referral to PT: 17  Referring Physician: Dr. Jacinto    Admit Date: 2017    Visit Dx:    ICD-10-CM ICD-9-CM   1. Viral pneumonia J12.9 480.9   2. Chronic atrial fibrillation I48.2 427.31     Patient Active Problem List   Diagnosis   • Diarrhea   • Respiratory failure, acute   • Viral pneumonia               Adult Rehabilitation Note       17 1553          Rehab Assessment/Intervention    Discipline physical therapy assistant  -RF      Document Type therapy note (daily note)  -RF      Subjective Information agree to therapy  -RF      Patient Effort, Rehab Treatment good  -RF      Precautions/Limitations fall precautions  -RF      Patient Response to Treatment Patient demonstrating improved strengthening and activity tolerance, however slight dyspnea is still noted with activity. Pt educated on HEP while sitting in chair.   -RF      Recorded by [RF] Alley Dubon PTA      Pain Assessment    Pain Assessment No/denies pain  -RF      Recorded by [RF] Alley Dubon PTA      Cognitive Assessment/Intervention    Current Cognitive/Communication Assessment functional  -RF      Orientation Status oriented to;person;place;situation  -RF      Follows Commands/Answers Questions able to follow single-step instructions  -RF      Personal Safety WNL/WFL  -RF      Personal Safety Interventions fall prevention program maintained;muscle strengthening facilitated;gait belt;nonskid shoes/slippers when out of bed  -RF      Recorded by [RF] Alley Dubon PTA      Bed Mobility, Assessment/Treatment    Bed Mobility, Assistive Device bed rails  -RF      Bed Mob, Supine to Sit, [place] minimum assist (75% patient effort)  -RF      Bed Mob, Sit to Supine, [place] minimum assist (75% patient  effort)  -RF      Recorded by [RF] Alley Dubon PTA      Transfer Assessment/Treatment    Transfers, Bed-Chair [place] minimum assist (75% patient effort);nonverbal cues required (demo/gesture);verbal cues required  -RF      Transfers, Chair-Bed [place] minimum assist (75% patient effort);nonverbal cues required (demo/gesture);verbal cues required  -RF      Transfers, Sit-Stand [place] minimum assist (75% patient effort);moderate assist (50% patient effort)  -RF      Transfers, Stand-Sit [place] minimum assist (75% patient effort);moderate assist (50% patient effort)  -RF      Transfers, Sit-Stand-Sit, Assist Device rolling walker  -RF      Recorded by [RF] Alley Dubon PTA      Gait Assessment/Treatment    Gait, [place] Level minimum assist (75% patient effort);moderate assist (50% patient effort);2 person assist required  -RF      Gait, Assistive Device rolling walker  -RF      Gait, Distance (Feet) 50  -RF      Gait, Comment fatigue noted  -RF      Recorded by [RF] Alley Dubon PTA      Therapy Exercises    Bilateral Lower Extremities AROM:;15 reps;sitting  -RF      Recorded by [RF] Alley Dubon PTA      Positioning and Restraints    Pre-Treatment Position in bed  -RF      Post Treatment Position chair  -RF      In Chair sitting;call light within reach;encouraged to call for assist;with family/caregiver  -RF      Recorded by [RF] Alley Dubon PTA        User Key  (r) = Recorded By, (t) = Taken By, (c) = Cosigned By    Initials Name Effective Dates    RF Alley Dubon PTA 07/19/17 -                 IP PT Goals       11/22/17 1552          Bed Mobility PT LTG    Bed Mobility PT LTG, Date Established 11/22/17  -BC      Bed Mobility PT LTG, Time to Achieve by discharge  -BC      Bed Mobility PT LTG, Activity Type all bed mobility  -BC      Bed Mobility PT LTG, [place] Level contact guard assist  -BC      Bed Mobility PT Goal  LTG, Assist Device bed rails  -BC       Transfer Training PT LTG    Transfer Training PT LTG, Date Established 11/22/17  -BC      Transfer Training PT LTG, Time to Achieve by discharge  -BC      Transfer Training PT LTG, Activity Type all transfers  -BC      Transfer Training PT LTG, [place] Level contact guard assist  -BC      Transfer Training PT LTG, Assist Device walker, rolling  -BC      Gait Training PT LTG    Gait Training Goal PT LTG, Date Established 11/22/17  -BC      Gait Training Goal PT LTG, Time to Achieve by discharge  -BC      Gait Training Goal PT LTG, [place] Level contact guard assist  -BC      Gait Training Goal PT LTG, Assist Device walker, rolling  -BC      Gait Training Goal PT LTG, Distance to Achieve 80  -BC        User Key  (r) = Recorded By, (t) = Taken By, (c) = Cosigned By    Initials Name Provider Type    ERICA Cardona, PT Physical Therapist          Physical Therapy Education     Title: PT OT SLP Therapies (Done)     Topic: Physical Therapy (Done)     Point: Mobility training (Done)    Learning Progress Summary    Learner Readiness Method Response Comment Documented by Status   Patient Acceptance E VU,NR   11/27/17 1611 Done    Acceptance E VU,NR   11/24/17 2029 Done    Acceptance E VU,NR   11/24/17 1510 Done    Acceptance E VU,NR   11/23/17 2145 Done    Acceptance E VU  BC 11/22/17 1551 Done   Family Acceptance E VU,NR   11/27/17 1611 Done               Point: Home exercise program (Done)    Learning Progress Summary    Learner Readiness Method Response Comment Documented by Status   Patient Acceptance E VU,NR   11/27/17 1611 Done    Acceptance E VU,NR   11/24/17 2029 Done    Acceptance E VU,NR   11/24/17 1510 Done    Acceptance E VU,NR   11/23/17 2145 Done    Acceptance E VU  BC 11/22/17 1551 Done   Family Acceptance E VU,NR   11/27/17 1611 Done               Point: Body mechanics (Done)    Learning Progress Summary    Learner Readiness Method Response Comment Documented by Status    Patient Acceptance E VU,NR   11/27/17 1611 Done    Acceptance E VU,NR   11/24/17 2029 Done    Acceptance E VU,NR   11/24/17 1510 Done    Acceptance E VU,NR   11/23/17 2145 Done    Acceptance E VU  BC 11/22/17 1551 Done   Family Acceptance E VU,NR   11/27/17 1611 Done               Point: Precautions (Done)    Learning Progress Summary    Learner Readiness Method Response Comment Documented by Status   Patient Acceptance E VU,NR   11/27/17 1611 Done    Acceptance E VU,NR   11/24/17 2029 Done    Acceptance E VU,NR   11/24/17 1510 Done    Acceptance E VU,NR   11/23/17 2145 Done    Acceptance E VU  BC 11/22/17 1551 Done   Family Acceptance E VU,NR   11/27/17 1611 Done                      User Key     Initials Effective Dates Name Provider Type Discipline    BC 03/14/16 -  Adele Cardona, PT Physical Therapist PT     04/14/17 -  Richmond Seals, RN Registered Nurse Nurse     07/19/17 -  Alley Dubon, PTA Physical Therapy Assistant PT                    PT Recommendation and Plan  Anticipated Equipment Needs At Discharge: front wheeled walker  Planned Therapy Interventions: balance training, bed mobility training, gait training, home exercise program, patient/family education, strengthening, transfer training  PT Frequency: 3-5 times/wk, per priority policy             Outcome Measures       11/27/17 1600          How much help from another person do you currently need...    Turning from your back to your side while in flat bed without using bedrails? 3  -RF      Moving from lying on back to sitting on the side of a flat bed without bedrails? 2  -RF      Moving to and from a bed to a chair (including a wheelchair)? 3  -RF      Standing up from a chair using your arms (e.g., wheelchair, bedside chair)? 3  -RF      Climbing 3-5 steps with a railing? 2  -RF      To walk in hospital room? 3  -RF      AM-PAC 6 Clicks Score 16  -RF      Functional Assessment    Outcome Measure Options AM-PAC 6  Clicks Basic Mobility (PT)  -RF        User Key  (r) = Recorded By, (t) = Taken By, (c) = Cosigned By    Initials Name Provider Type    RF Alley Dubon PTA Physical Therapy Assistant           Time Calculation:         PT Charges       11/27/17 1611          Time Calculation    PT Received On 11/27/17  -RF      PT - Next Appointment 11/28/17  -RF      PT Goal Re-Cert Due Date 12/06/17  -RF      Time Calculation- PT    Total Timed Code Minutes- PT 25 minute(s)  -RF        User Key  (r) = Recorded By, (t) = Taken By, (c) = Cosigned By    Initials Name Provider Type    RF Alley Dubon PTA Physical Therapy Assistant          Therapy Charges for Today     Code Description Service Date Service Provider Modifiers Qty    47191471840 HC GAIT TRAINING EA 15 MIN 11/27/2017 Alley Dubon PTA GP 1    62947867789 HC PT THER PROC EA 15 MIN 11/27/2017 Alley Dubon PTA GP 1    21014263861 HC PT THER SUPP EA 15 MIN 11/27/2017 Alley Dubon PTA GP 2          PT G-Codes  Outcome Measure Options: AM-PAC 6 Clicks Basic Mobility (PT)  Score: 16  Functional Limitation: Mobility: Walking and moving around  Mobility: Walking and Moving Around Current Status (): At least 40 percent but less than 60 percent impaired, limited or restricted  Mobility: Walking and Moving Around Goal Status (): At least 20 percent but less than 40 percent impaired, limited or restricted    Alley Dubon PTA  11/27/2017

## 2017-11-27 NOTE — PROGRESS NOTES
Continued Stay Note  CECILE Nails     Patient Name: Cari Harper  MRN: [id number]  Today's Date: 11/27/2017    Admit Date: 11/21/2017          Discharge Plan       11/27/17 1017    Case Management/Social Work Plan    Plan CM follow up visit: Pt sitting up in bedside chair, after ambulating with P.T. Son , Doroteo, is at bedside. Anbx's were changed on 11/26/17 d/t CT scan revealing semaj infiltrate. Pt remains on O2, nebs and pred. She lives at home alone with good family support. She has 3 sons & 1 dtr who take turns staying with her throughout the week. She plans to return back home when stable for dc. She currently denies any anticipated dc needs. Pt may require Home O2 upon discharge, if she qualifies. CM will cont. to follow and assist as needed.     Patient/Family In Agreement With Plan yes              Discharge Codes     None            Mckenzie Turpin RN

## 2017-11-28 ENCOUNTER — APPOINTMENT (OUTPATIENT)
Dept: GENERAL RADIOLOGY | Facility: HOSPITAL | Age: 77
End: 2017-11-28

## 2017-11-28 LAB
ALBUMIN SERPL-MCNC: 3.3 G/DL (ref 3.4–4.8)
ALBUMIN/GLOB SERPL: 1.4 G/DL (ref 1.5–2.5)
ALP SERPL-CCNC: 162 U/L (ref 35–104)
ALT SERPL W P-5'-P-CCNC: 45 U/L (ref 10–36)
ANION GAP SERPL CALCULATED.3IONS-SCNC: 9.1 MMOL/L (ref 3.6–11.2)
AST SERPL-CCNC: 32 U/L (ref 10–30)
BASOPHILS # BLD AUTO: 0.01 10*3/MM3 (ref 0–0.3)
BASOPHILS NFR BLD AUTO: 0.1 % (ref 0–2)
BILIRUB SERPL-MCNC: 0.8 MG/DL (ref 0.2–1.8)
BNP SERPL-MCNC: 521 PG/ML (ref 0–100)
BUN BLD-MCNC: 28 MG/DL (ref 7–21)
BUN/CREAT SERPL: 28.6 (ref 7–25)
CALCIUM SPEC-SCNC: 8.5 MG/DL (ref 7.7–10)
CHLORIDE SERPL-SCNC: 106 MMOL/L (ref 99–112)
CO2 SERPL-SCNC: 24.9 MMOL/L (ref 24.3–31.9)
CREAT BLD-MCNC: 0.98 MG/DL (ref 0.43–1.29)
CRP SERPL-MCNC: 9.97 MG/DL (ref 0–0.99)
DEPRECATED RDW RBC AUTO: 46.1 FL (ref 37–54)
EOSINOPHIL # BLD AUTO: 0.09 10*3/MM3 (ref 0–0.7)
EOSINOPHIL NFR BLD AUTO: 1.1 % (ref 0–7)
ERYTHROCYTE [DISTWIDTH] IN BLOOD BY AUTOMATED COUNT: 15.2 % (ref 11.5–14.5)
GFR SERPL CREATININE-BSD FRML MDRD: 55 ML/MIN/1.73
GLOBULIN UR ELPH-MCNC: 2.3 GM/DL
GLUCOSE BLD-MCNC: 100 MG/DL (ref 70–110)
HCT VFR BLD AUTO: 31.3 % (ref 37–47)
HGB BLD-MCNC: 9.5 G/DL (ref 12–16)
IMM GRANULOCYTES # BLD: 0.02 10*3/MM3 (ref 0–0.03)
IMM GRANULOCYTES NFR BLD: 0.2 % (ref 0–0.5)
LYMPHOCYTES # BLD AUTO: 0.7 10*3/MM3 (ref 1–3)
LYMPHOCYTES NFR BLD AUTO: 8.2 % (ref 16–46)
MCH RBC QN AUTO: 25.8 PG (ref 27–33)
MCHC RBC AUTO-ENTMCNC: 30.4 G/DL (ref 33–37)
MCV RBC AUTO: 85.1 FL (ref 80–94)
MONOCYTES # BLD AUTO: 0.75 10*3/MM3 (ref 0.1–0.9)
MONOCYTES NFR BLD AUTO: 8.8 % (ref 0–12)
NEUTROPHILS # BLD AUTO: 6.99 10*3/MM3 (ref 1.4–6.5)
NEUTROPHILS NFR BLD AUTO: 81.6 % (ref 40–75)
OSMOLALITY SERPL CALC.SUM OF ELEC: 285 MOSM/KG (ref 273–305)
PLATELET # BLD AUTO: 270 10*3/MM3 (ref 130–400)
PMV BLD AUTO: 11.1 FL (ref 6–10)
POTASSIUM BLD-SCNC: 3.8 MMOL/L (ref 3.5–5.3)
PROT SERPL-MCNC: 5.6 G/DL (ref 6–8)
RBC # BLD AUTO: 3.68 10*6/MM3 (ref 4.2–5.4)
SODIUM BLD-SCNC: 140 MMOL/L (ref 135–153)
WBC NRBC COR # BLD: 8.56 10*3/MM3 (ref 4.5–12.5)

## 2017-11-28 PROCEDURE — 80053 COMPREHEN METABOLIC PANEL: CPT | Performed by: INTERNAL MEDICINE

## 2017-11-28 PROCEDURE — 97116 GAIT TRAINING THERAPY: CPT

## 2017-11-28 PROCEDURE — 83880 ASSAY OF NATRIURETIC PEPTIDE: CPT | Performed by: INTERNAL MEDICINE

## 2017-11-28 PROCEDURE — 94799 UNLISTED PULMONARY SVC/PX: CPT

## 2017-11-28 PROCEDURE — 99232 SBSQ HOSP IP/OBS MODERATE 35: CPT | Performed by: INTERNAL MEDICINE

## 2017-11-28 PROCEDURE — 86140 C-REACTIVE PROTEIN: CPT | Performed by: PHYSICIAN ASSISTANT

## 2017-11-28 PROCEDURE — 97110 THERAPEUTIC EXERCISES: CPT

## 2017-11-28 PROCEDURE — 71010 HC CHEST AP: CPT

## 2017-11-28 PROCEDURE — 25010000002 PIPERACILLIN-TAZOBACTAM: Performed by: INTERNAL MEDICINE

## 2017-11-28 PROCEDURE — 63710000001 PREDNISONE PER 1 MG: Performed by: HOSPITALIST

## 2017-11-28 PROCEDURE — 85025 COMPLETE CBC W/AUTO DIFF WBC: CPT | Performed by: INTERNAL MEDICINE

## 2017-11-28 PROCEDURE — 71010 XR CHEST AP: CPT | Performed by: RADIOLOGY

## 2017-11-28 RX ORDER — GUAIFENESIN 600 MG/1
1200 TABLET, EXTENDED RELEASE ORAL EVERY 12 HOURS SCHEDULED
Status: DISCONTINUED | OUTPATIENT
Start: 2017-11-28 | End: 2017-11-29 | Stop reason: HOSPADM

## 2017-11-28 RX ADMIN — PREDNISONE 20 MG: 20 TABLET ORAL at 08:09

## 2017-11-28 RX ADMIN — PIPERACILLIN SODIUM,TAZOBACTAM SODIUM 3.38 G: 3; .375 INJECTION, POWDER, FOR SOLUTION INTRAVENOUS at 20:51

## 2017-11-28 RX ADMIN — APIXABAN 5 MG: 5 TABLET, FILM COATED ORAL at 08:09

## 2017-11-28 RX ADMIN — PIPERACILLIN SODIUM,TAZOBACTAM SODIUM 3.38 G: 3; .375 INJECTION, POWDER, FOR SOLUTION INTRAVENOUS at 11:00

## 2017-11-28 RX ADMIN — THYROID, PORCINE 60 MG: 60 TABLET ORAL at 05:55

## 2017-11-28 RX ADMIN — BUMETANIDE 2 MG: 1 TABLET ORAL at 08:09

## 2017-11-28 RX ADMIN — FERROUS SULFATE TAB 325 MG (65 MG ELEMENTAL FE) 325 MG: 325 (65 FE) TAB at 15:00

## 2017-11-28 RX ADMIN — BUDESONIDE AND FORMOTEROL FUMARATE DIHYDRATE 2 PUFF: 80; 4.5 AEROSOL RESPIRATORY (INHALATION) at 18:58

## 2017-11-28 RX ADMIN — BUDESONIDE AND FORMOTEROL FUMARATE DIHYDRATE 2 PUFF: 80; 4.5 AEROSOL RESPIRATORY (INHALATION) at 06:30

## 2017-11-28 RX ADMIN — APIXABAN 5 MG: 5 TABLET, FILM COATED ORAL at 20:51

## 2017-11-28 RX ADMIN — CLONAZEPAM 0.5 MG: 0.5 TABLET ORAL at 20:52

## 2017-11-28 RX ADMIN — PIPERACILLIN SODIUM,TAZOBACTAM SODIUM 3.38 G: 3; .375 INJECTION, POWDER, FOR SOLUTION INTRAVENOUS at 03:00

## 2017-11-28 RX ADMIN — FLECAINIDE ACETATE 50 MG: 50 TABLET ORAL at 20:51

## 2017-11-28 RX ADMIN — IPRATROPIUM BROMIDE AND ALBUTEROL SULFATE 3 ML: .5; 3 SOLUTION RESPIRATORY (INHALATION) at 06:30

## 2017-11-28 RX ADMIN — IPRATROPIUM BROMIDE AND ALBUTEROL SULFATE 3 ML: .5; 3 SOLUTION RESPIRATORY (INHALATION) at 18:58

## 2017-11-28 RX ADMIN — FLUOXETINE HYDROCHLORIDE 20 MG: 20 CAPSULE ORAL at 20:52

## 2017-11-28 RX ADMIN — GUAIFENESIN 1200 MG: 600 TABLET, EXTENDED RELEASE ORAL at 19:24

## 2017-11-28 RX ADMIN — POTASSIUM CHLORIDE 10 MEQ: 750 TABLET, FILM COATED, EXTENDED RELEASE ORAL at 08:09

## 2017-11-28 RX ADMIN — Medication 1 CAPSULE: at 08:09

## 2017-11-28 RX ADMIN — FLECAINIDE ACETATE 50 MG: 50 TABLET ORAL at 08:09

## 2017-11-28 RX ADMIN — FLUOXETINE HYDROCHLORIDE 20 MG: 20 CAPSULE ORAL at 08:09

## 2017-11-28 NOTE — PROGRESS NOTES
Antimicrobial Length of Therapy:    Day 3 of 14 Zosysebastien    Thank you.  Elissa Vincent, Pharm.D.  11/28/2017  1:35 PM

## 2017-11-28 NOTE — THERAPY TREATMENT NOTE
Acute Care - Physical Therapy Treatment Note   Jesu     Patient Name: Cari Harper  : 1940  MRN: [id number]  Today's Date: 2017  Onset of Illness/Injury or Date of Surgery Date: 17  Date of Referral to PT: 17  Referring Physician: Dr. Jacinto    Admit Date: 2017    Visit Dx:    ICD-10-CM ICD-9-CM   1. Viral pneumonia J12.9 480.9   2. Chronic atrial fibrillation I48.2 427.31     Patient Active Problem List   Diagnosis   • Diarrhea   • Respiratory failure, acute   • Viral pneumonia               Adult Rehabilitation Note       17 1609 17 1553       Rehab Assessment/Intervention    Discipline physical therapy assistant  -RF physical therapy assistant  -RF     Document Type therapy note (daily note)  -RF therapy note (daily note)  -RF     Subjective Information agree to therapy  -RF agree to therapy  -RF     Patient Effort, Rehab Treatment good  -RF good  -RF     Symptoms Noted During/After Treatment fatigue;shortness of breath  -RF      Precautions/Limitations fall precautions  -RF fall precautions  -RF     Patient Response to Treatment Patient demonstrates improving ambualtion distances and activity tolerance at this time. Continued skilled care required for further improvements.   -RF Patient demonstrating improved strengthening and activity tolerance, however slight dyspnea is still noted with activity. Pt educated on HEP while sitting in chair.   -RF     Recorded by [RF] Alley Dubon PTA [RF] Alley Dubon PTA     Vital Signs    Pre SpO2 (%) 95  -RF      O2 Delivery Pre Treatment supplemental O2  -RF      Intra SpO2 (%) 88  -RF      O2 Delivery Intra Treatment supplemental O2  -RF      Post SpO2 (%) 96  -RF      O2 Delivery Post Treatment supplemental O2  -RF      Recorded by [RF] Alley Dubon PTA      Pain Assessment    Pain Assessment  No/denies pain  -RF     Recorded by  [RF] Alley Dubon PTA     Cognitive Assessment/Intervention    Current  Cognitive/Communication Assessment functional  -RF functional  -RF     Orientation Status oriented to;person;place;situation  -RF oriented to;person;place;situation  -RF     Follows Commands/Answers Questions able to follow single-step instructions;100% of the time  -RF able to follow single-step instructions  -RF     Personal Safety WNL/WFL  -RF WNL/WFL  -RF     Personal Safety Interventions fall prevention program maintained;muscle strengthening facilitated;gait belt;nonskid shoes/slippers when out of bed  -RF fall prevention program maintained;muscle strengthening facilitated;gait belt;nonskid shoes/slippers when out of bed  -RF     Recorded by [RF] Alley Dubon PTA [RF] Alley Dubon PTA     Bed Mobility, Assessment/Treatment    Bed Mobility, Assistive Device  bed rails  -RF     Bed Mob, Supine to Sit, [place]  minimum assist (75% patient effort)  -RF     Bed Mob, Sit to Supine, [place]  minimum assist (75% patient effort)  -RF     Bed Mobility, Comment Pt up in chair  -RF      Recorded by [RF] Alley Dubon PTA [RF] Alley Dubon PTA     Transfer Assessment/Treatment    Transfers, Bed-Chair [place] nonverbal cues required (demo/gesture);verbal cues required;contact guard assist  -RF minimum assist (75% patient effort);nonverbal cues required (demo/gesture);verbal cues required  -RF     Transfers, Chair-Bed [place] nonverbal cues required (demo/gesture);verbal cues required;contact guard assist  -RF minimum assist (75% patient effort);nonverbal cues required (demo/gesture);verbal cues required  -RF     Transfers, Sit-Stand [place] contact guard assist  -RF minimum assist (75% patient effort);moderate assist (50% patient effort)  -RF     Transfers, Stand-Sit [place] contact guard assist  -RF minimum assist (75% patient effort);moderate assist (50% patient effort)  -RF     Transfers, Sit-Stand-Sit, Assist Device rolling walker  -RF rolling walker  -RF     Recorded by  [RF] Alley Dubon PTA [RF] Alley Dubon PTA     Gait Assessment/Treatment    Gait, [place] Level minimum assist (75% patient effort);moderate assist (50% patient effort);2 person assist required  -RF minimum assist (75% patient effort);moderate assist (50% patient effort);2 person assist required  -RF     Gait, Assistive Device rolling walker  -RF rolling walker  -RF     Gait, Distance (Feet) 100  -RF 50  -RF     Gait, Gait Pattern Analysis swing-through gait  -RF      Gait, Gait Deviations mohini decreased  -RF      Gait, Comment Fatigue and dyspnea noted, standing rest break required  -RF fatigue noted  -RF     Recorded by [RF] Alley Dubon PTA [RF] Alley Dubon PTA     Therapy Exercises    Bilateral Lower Extremities AROM:;15 reps;sitting  -RF AROM:;15 reps;sitting  -RF     Recorded by [RF] Alley Dubon, GWENDOLYN [RF] Alley Dubon PTA     Positioning and Restraints    Pre-Treatment Position sitting in chair/recliner  -RF in bed  -RF     Post Treatment Position chair  -RF chair  -RF     In Chair sitting;call light within reach;encouraged to call for assist;with family/caregiver  -RF sitting;call light within reach;encouraged to call for assist;with family/caregiver  -RF     Recorded by [RF] Alley Dubon PTA [RF] Alley Dubon PTA       User Key  (r) = Recorded By, (t) = Taken By, (c) = Cosigned By    Initials Name Effective Dates    RF Alley Dubon PTA 07/19/17 -                 IP PT Goals       11/22/17 1552          Bed Mobility PT LTG    Bed Mobility PT LTG, Date Established 11/22/17  -BC      Bed Mobility PT LTG, Time to Achieve by discharge  -BC      Bed Mobility PT LTG, Activity Type all bed mobility  -BC      Bed Mobility PT LTG, [place] Level contact guard assist  -BC      Bed Mobility PT Goal  LTG, Assist Device bed rails  -BC      Transfer Training PT LTG    Transfer Training PT LTG, Date Established 11/22/17  -BC      Transfer Training PT LTG, Time to  Achieve by discharge  -BC      Transfer Training PT LTG, Activity Type all transfers  -BC      Transfer Training PT LTG, [place] Level contact guard assist  -BC      Transfer Training PT LTG, Assist Device walker, rolling  -BC      Gait Training PT LTG    Gait Training Goal PT LTG, Date Established 11/22/17  -BC      Gait Training Goal PT LTG, Time to Achieve by discharge  -BC      Gait Training Goal PT LTG, [place] Level contact guard assist  -BC      Gait Training Goal PT LTG, Assist Device walker, rolling  -BC      Gait Training Goal PT LTG, Distance to Achieve 80  -BC        User Key  (r) = Recorded By, (t) = Taken By, (c) = Cosigned By    Initials Name Provider Type    ERICA Cardona, PT Physical Therapist          Physical Therapy Education     Title: PT OT SLP Therapies (Done)     Topic: Physical Therapy (Done)     Point: Mobility training (Done)    Learning Progress Summary    Learner Readiness Method Response Comment Documented by Status   Patient Acceptance E VU,NR   11/28/17 1614 Done    Acceptance E VU,[facility] 11/27/17 1611 Done    Acceptance E VU,Critical access hospital 11/24/17 2029 Done    Acceptance E VU,[facility] 11/24/17 1510 Done    Acceptance E VU,Critical access hospital 11/23/17 2145 Done    Acceptance E VU  BC 11/22/17 1551 Done   Family Acceptance E VU,[facility] 11/28/17 1614 Done    Acceptance E VU,[facility] 11/27/17 1611 Done               Point: Home exercise program (Done)    Learning Progress Summary    Learner Readiness Method Response Comment Documented by Status   Patient Acceptance E VU,NR   11/28/17 1614 Done    Acceptance E VU,[facility] 11/27/17 1611 Done    Acceptance E VU,NR   11/24/17 2029 Done    Acceptance E VU,[facility] 11/24/17 1510 Done    Acceptance E VU,[facility] hospital 11/23/17 2145 Done    Acceptance E VU  BC 11/22/17 1551 Done   Family Acceptance E VU,[facility] 11/28/17 1614 Done    Acceptance E VU,[facility] 11/27/17 1611 Done               Point: Body mechanics (Done)    Learning Progress Summary    Learner  Readiness Method Response Comment Documented by Status   Patient Acceptance E VU,NR   11/28/17 1614 Done    Acceptance E VU,NR   11/27/17 1611 Done    Acceptance E VU,NR   11/24/17 2029 Done    Acceptance E VU,NR   11/24/17 1510 Done    Acceptance E VU,NR   11/23/17 2145 Done    Acceptance E VU  BC 11/22/17 1551 Done   Family Acceptance E VU,NR   11/28/17 1614 Done    Acceptance E VU,NR   11/27/17 1611 Done               Point: Precautions (Done)    Learning Progress Summary    Learner Readiness Method Response Comment Documented by Status   Patient Acceptance E VU,NR   11/28/17 1614 Done    Acceptance E VU,NR   11/27/17 1611 Done    Acceptance E VU,NR   11/24/17 2029 Done    Acceptance E VU,NR   11/24/17 1510 Done    Acceptance E VU,NR   11/23/17 2145 Done    Acceptance E VU  BC 11/22/17 1551 Done   Family Acceptance E VU,NR   11/28/17 1614 Done    Acceptance E VU,NR   11/27/17 1611 Done                      User Key     Initials Effective Dates Name Provider Type Discipline    BC 03/14/16 -  Adele Cardona, PT Physical Therapist PT     04/14/17 -  Richmond Seals, RN Registered Nurse Nurse     07/19/17 -  Alley Dubon, PTA Physical Therapy Assistant PT                    PT Recommendation and Plan  Anticipated Equipment Needs At Discharge: front wheeled walker  Planned Therapy Interventions: balance training, bed mobility training, gait training, home exercise program, patient/family education, strengthening, transfer training  PT Frequency: 3-5 times/wk, per priority policy             Outcome Measures       11/28/17 1600 11/27/17 1600       How much help from another person do you currently need...    Turning from your back to your side while in flat bed without using bedrails? 3  -RF 3  -RF     Moving from lying on back to sitting on the side of a flat bed without bedrails? 2  -RF 2  -RF     Moving to and from a bed to a chair (including a wheelchair)? 3  -RF 3  -RF      Standing up from a chair using your arms (e.g., wheelchair, bedside chair)? 3  -RF 3  -RF     Climbing 3-5 steps with a railing? 2  -RF 2  -RF     To walk in hospital room? 3  -RF 3  -RF     AM-PAC 6 Clicks Score 16  -RF 16  -RF     Functional Assessment    Outcome Measure Options AM-PAC 6 Clicks Basic Mobility (PT)  -RF AM-PAC 6 Clicks Basic Mobility (PT)  -RF       User Key  (r) = Recorded By, (t) = Taken By, (c) = Cosigned By    Initials Name Provider Type    RF Alley Dubon PTA Physical Therapy Assistant           Time Calculation:         PT Charges       11/28/17 1614          Time Calculation    PT Received On 11/28/17  -RF      PT - Next Appointment 11/29/17  -RF      PT Goal Re-Cert Due Date 12/06/17  -RF      Time Calculation- PT    Total Timed Code Minutes- PT 24 minute(s)  -RF        User Key  (r) = Recorded By, (t) = Taken By, (c) = Cosigned By    Initials Name Provider Type    RF Alley Dubon PTA Physical Therapy Assistant          Therapy Charges for Today     Code Description Service Date Service Provider Modifiers Qty    09796487978 HC GAIT TRAINING EA 15 MIN 11/27/2017 Alley Dubon, PTA GP 1    38801833642 HC PT THER PROC EA 15 MIN 11/27/2017 Alley Dubon, PTA GP 1    46111597790 HC PT THER SUPP EA 15 MIN 11/27/2017 Alley Dubon, PTA GP 2    12185791969 HC GAIT TRAINING EA 15 MIN 11/28/2017 Alley Dubon, PTA GP 1    60826546976 HC PT THER PROC EA 15 MIN 11/28/2017 Alley Dubon, PTA GP 1    00654829952 HC PT THER SUPP EA 15 MIN 11/28/2017 Alley Dubon, GWENDOLYN GP 2          PT G-Codes  Outcome Measure Options: AM-PAC 6 Clicks Basic Mobility (PT)  Score: 16  Functional Limitation: Mobility: Walking and moving around  Mobility: Walking and Moving Around Current Status (): At least 40 percent but less than 60 percent impaired, limited or restricted  Mobility: Walking and Moving Around Goal Status (): At least 20 percent but less than 40 percent impaired, limited  or restricted    Alley Dubon, PTA  11/28/2017

## 2017-11-28 NOTE — PROGRESS NOTES
Subjective     History:   Cari Harper is a 77 y.o. female admitted on 11/21/2017 secondary to Respiratory failure, acute     Procedures: None    Patient seen and examined with RUTHIE Hurley. Awake and alert. Currently sitting in bedside chair with her son present at bedside. Dyspnea and cough improving. She was able to ambulate in the hallway with PT today. Denies CP. Continues to report LE edema worse than her baseline and she was encouraged to keep her feet elevated. No acute events overnight per RN.     History taken from: patient, chart, and RN.      Objective     Vital Signs  Temp:  [97.4 °F (36.3 °C)-97.7 °F (36.5 °C)] 97.7 °F (36.5 °C)  Heart Rate:  [54-61] 61  Resp:  [8-18] 18  BP: (117-147)/(41-63) 128/52    Intake/Output Summary (Last 24 hours) at 11/28/17 1656  Last data filed at 11/28/17 1208   Gross per 24 hour   Intake              500 ml   Output             2600 ml   Net            -2100 ml         Physical Exam:  General:    Awake, alert, in no acute distress   Heart:      Normal S1 and S2. Regular rate and rhythm. No significant murmur, rubs or gallops appreciated.   Lungs:     Respirations regular, even and unlabored. Diminished breath sounds throughout. No wheezes appreciated.    Abdomen:   Soft and nontender. No guarding, rebound tenderness or  organomegaly noted. Bowel sounds present x 4.   Extremities:  1+ pitting edema in LE B/L. Moves UE and LE equally B/L.     Results Review:      Results from last 7 days  Lab Units 11/28/17  0122 11/27/17  0040 11/26/17  0122 11/25/17  0130 11/24/17  0510 11/22/17  0231 11/21/17  2221   WBC 10*3/mm3 8.56 8.24 9.09 9.89 10.25 9.08 9.12   HEMOGLOBIN g/dL 9.5* 9.0* 9.1* 9.6* 9.6* 9.7* 10.5*   PLATELETS 10*3/mm3 270 233 242 257 254 242 267       Results from last 7 days  Lab Units 11/28/17  0122 11/27/17  0040 11/26/17  0122 11/25/17  0130 11/24/17  0510 11/22/17  0231 11/21/17  2221   SODIUM mmol/L 140 140 141 142 141 139 137   POTASSIUM mmol/L 3.8 4.2 4.2 4.7  4.6 3.2* 3.5   CHLORIDE mmol/L 106 109 109 110 110 105 101   CO2 mmol/L 24.9 22.6* 25.4 25.4 25.2 21.7* 27.9   BUN mg/dL 28* 29* 36* 36* 32* 21 23*   CREATININE mg/dL 0.98 0.69 0.83 0.86 0.75 1.17 1.20   CALCIUM mg/dL 8.5 8.5 8.7 9.4 9.1 8.4 9.2   GLUCOSE mg/dL 100 107 109 100 116* 166* 118*       Results from last 7 days  Lab Units 11/28/17  0122 11/22/17  0231 11/21/17  2221   BILIRUBIN mg/dL 0.8 0.7 0.8   ALK PHOS U/L 162* 174* 175*   AST (SGOT) U/L 32* 23 29   ALT (SGPT) U/L 45* 11 21       Results from last 7 days  Lab Units 11/26/17  0122 11/25/17  0130 11/24/17  0510   MAGNESIUM mg/dL 2.4 2.6 2.5       Results from last 7 days  Lab Units 11/21/17  2221   INR  1.43*       Results from last 7 days  Lab Units 11/24/17  0510 11/22/17  0025 11/21/17  2221   TROPONIN I ng/mL 0.038 0.023 0.016       Imaging Results (last 24 hours)     Procedure Component Value Units Date/Time    XR Chest AP [[id number]] Collected:  11/28/17 0637     Updated:  11/28/17 0704    Narrative:       EXAMINATION: XR CHEST AP-      CLINICAL INDICATION:     Pneumonia, CHF; J12.9-Viral pneumonia,  unspecified; I48.2-Chronic atrial fibrillation     TECHNIQUE:  XR CHEST AP-      COMPARISON: 11/25/2017      FINDINGS:   Interval improvement in bilateral airspace disease, right greater than  left lungs. No effusion or pneumothorax. Chest otherwise stable.       Impression:       Improvement in bilateral airspace disease.     This report was finalized on 11/28/2017 6:37 AM by Dr. Shin Padilla MD.               Medications:    apixaban 5 mg Oral Q12H   budesonide-formoterol 2 puff Inhalation BID - RT   bumetanide 2 mg Oral Daily   clonazePAM 0.5 mg Oral Nightly   ferrous sulfate 325 mg Oral Q PM   flecainide 50 mg Oral Q12H   FLUoxetine 20 mg Oral Q12H   ipratropium-albuterol 3 mL Nebulization BID   piperacillin-tazobactam 3.375 g Intravenous Q8H   potassium chloride 10 mEq Oral Daily   predniSONE 20 mg Oral Daily   Risaquad-2 1 capsule Oral Daily    Thyroid 60 mg Oral QAM              Assessment/Plan   Acute hypoxic respiratory failure: Likely multifactorial 2/2 bilateral pneumonia, COPD exacerbation, CHF exacerbation and severe pulmonary HTN. Cont antibiotics, steroids nebs and diuretics in addition to supplemental O2.     Severe sepsis: Likely 2/2 bilateral pneumonia. Atypical workup is negative. WBC remains stable. CRP improved again today. Sputum culture ordered. Repeat CXR today appears improved. Cont current antibiotics as pt is showing clinical improvement. Repeat labs in the AM.     Acute exacerbation of COPD: Cont treatment as outlined above. PT consulted in setting of deconditioning.      Acute on chronic diastolic CHF: Clinically improving. Cont home dose of Bumex. Repeat BNP and CXR improved this AM.     Severe pulmonary HTN: Cont treatment as outlined above.     Hx of Afib: Cont current medication regimen. Cont Eliquis for anticoagulation. Cont to monitor on telemetry.     DVT PPX: Eliquis    Pt is at high risk 2/2 respiratory failure, pneumonia, sepsis, CHF exacerbation, COPD exacerbation and advanced age.       Walter Sanchez,   11/28/17  4:56 PM

## 2017-11-29 VITALS
SYSTOLIC BLOOD PRESSURE: 120 MMHG | BODY MASS INDEX: 36.5 KG/M2 | HEART RATE: 62 BPM | TEMPERATURE: 97.5 F | DIASTOLIC BLOOD PRESSURE: 50 MMHG | HEIGHT: 61 IN | WEIGHT: 193.3 LBS | RESPIRATION RATE: 18 BRPM | OXYGEN SATURATION: 97 %

## 2017-11-29 LAB
ALBUMIN SERPL-MCNC: 3.4 G/DL (ref 3.4–4.8)
ALBUMIN/GLOB SERPL: 1.3 G/DL (ref 1.5–2.5)
ALP SERPL-CCNC: 156 U/L (ref 35–104)
ALT SERPL W P-5'-P-CCNC: 42 U/L (ref 10–36)
ANION GAP SERPL CALCULATED.3IONS-SCNC: 8.8 MMOL/L (ref 3.6–11.2)
AST SERPL-CCNC: 32 U/L (ref 10–30)
BASOPHILS # BLD AUTO: 0 10*3/MM3 (ref 0–0.3)
BASOPHILS NFR BLD AUTO: 0 % (ref 0–2)
BILIRUB SERPL-MCNC: 0.8 MG/DL (ref 0.2–1.8)
BNP SERPL-MCNC: 222 PG/ML (ref 0–100)
BUN BLD-MCNC: 26 MG/DL (ref 7–21)
BUN/CREAT SERPL: 33.8 (ref 7–25)
CALCIUM SPEC-SCNC: 8.9 MG/DL (ref 7.7–10)
CHLORIDE SERPL-SCNC: 103 MMOL/L (ref 99–112)
CO2 SERPL-SCNC: 28.2 MMOL/L (ref 24.3–31.9)
CREAT BLD-MCNC: 0.77 MG/DL (ref 0.43–1.29)
CRP SERPL-MCNC: 7.46 MG/DL (ref 0–0.99)
DEPRECATED RDW RBC AUTO: 45.8 FL (ref 37–54)
EOSINOPHIL # BLD AUTO: 0.08 10*3/MM3 (ref 0–0.7)
EOSINOPHIL NFR BLD AUTO: 1 % (ref 0–7)
ERYTHROCYTE [DISTWIDTH] IN BLOOD BY AUTOMATED COUNT: 15.1 % (ref 11.5–14.5)
GFR SERPL CREATININE-BSD FRML MDRD: 72 ML/MIN/1.73
GLOBULIN UR ELPH-MCNC: 2.7 GM/DL
GLUCOSE BLD-MCNC: 102 MG/DL (ref 70–110)
HCT VFR BLD AUTO: 30.7 % (ref 37–47)
HGB BLD-MCNC: 9.4 G/DL (ref 12–16)
IMM GRANULOCYTES # BLD: 0.05 10*3/MM3 (ref 0–0.03)
IMM GRANULOCYTES NFR BLD: 0.6 % (ref 0–0.5)
LYMPHOCYTES # BLD AUTO: 0.79 10*3/MM3 (ref 1–3)
LYMPHOCYTES NFR BLD AUTO: 9.8 % (ref 16–46)
MCH RBC QN AUTO: 26.1 PG (ref 27–33)
MCHC RBC AUTO-ENTMCNC: 30.6 G/DL (ref 33–37)
MCV RBC AUTO: 85.3 FL (ref 80–94)
MONOCYTES # BLD AUTO: 0.75 10*3/MM3 (ref 0.1–0.9)
MONOCYTES NFR BLD AUTO: 9.3 % (ref 0–12)
NEUTROPHILS # BLD AUTO: 6.43 10*3/MM3 (ref 1.4–6.5)
NEUTROPHILS NFR BLD AUTO: 79.3 % (ref 40–75)
OSMOLALITY SERPL CALC.SUM OF ELEC: 284.4 MOSM/KG (ref 273–305)
PLATELET # BLD AUTO: 264 10*3/MM3 (ref 130–400)
PMV BLD AUTO: 11.4 FL (ref 6–10)
POTASSIUM BLD-SCNC: 3.3 MMOL/L (ref 3.5–5.3)
PROT SERPL-MCNC: 6.1 G/DL (ref 6–8)
RBC # BLD AUTO: 3.6 10*6/MM3 (ref 4.2–5.4)
SODIUM BLD-SCNC: 140 MMOL/L (ref 135–153)
WBC NRBC COR # BLD: 8.1 10*3/MM3 (ref 4.5–12.5)

## 2017-11-29 PROCEDURE — 94799 UNLISTED PULMONARY SVC/PX: CPT

## 2017-11-29 PROCEDURE — 83880 ASSAY OF NATRIURETIC PEPTIDE: CPT | Performed by: INTERNAL MEDICINE

## 2017-11-29 PROCEDURE — 63710000001 PREDNISONE PER 1 MG: Performed by: HOSPITALIST

## 2017-11-29 PROCEDURE — 85025 COMPLETE CBC W/AUTO DIFF WBC: CPT | Performed by: INTERNAL MEDICINE

## 2017-11-29 PROCEDURE — 25010000002 PIPERACILLIN-TAZOBACTAM: Performed by: INTERNAL MEDICINE

## 2017-11-29 PROCEDURE — 99239 HOSP IP/OBS DSCHRG MGMT >30: CPT | Performed by: INTERNAL MEDICINE

## 2017-11-29 PROCEDURE — 80053 COMPREHEN METABOLIC PANEL: CPT | Performed by: INTERNAL MEDICINE

## 2017-11-29 PROCEDURE — 86140 C-REACTIVE PROTEIN: CPT | Performed by: INTERNAL MEDICINE

## 2017-11-29 RX ORDER — PREDNISONE 10 MG/1
TABLET ORAL
Qty: 7 TABLET | Refills: 0 | Status: SHIPPED | OUTPATIENT
Start: 2017-11-29 | End: 2018-03-30

## 2017-11-29 RX ORDER — GUAIFENESIN 600 MG/1
1200 TABLET, EXTENDED RELEASE ORAL EVERY 12 HOURS SCHEDULED
Qty: 20 TABLET | Refills: 0 | Status: SHIPPED | OUTPATIENT
Start: 2017-11-29 | End: 2017-12-04

## 2017-11-29 RX ORDER — POTASSIUM CHLORIDE 20 MEQ/1
40 TABLET, EXTENDED RELEASE ORAL EVERY 4 HOURS
Status: DISCONTINUED | OUTPATIENT
Start: 2017-11-29 | End: 2017-11-29 | Stop reason: HOSPADM

## 2017-11-29 RX ORDER — L.ACID,CASEI/B.ANIMAL/S.THERMO 16B CELL
1 CAPSULE ORAL DAILY
Status: DISCONTINUED | OUTPATIENT
Start: 2017-11-30 | End: 2017-11-29 | Stop reason: HOSPADM

## 2017-11-29 RX ORDER — BUDESONIDE AND FORMOTEROL FUMARATE DIHYDRATE 80; 4.5 UG/1; UG/1
2 AEROSOL RESPIRATORY (INHALATION)
Qty: 10.2 G | Refills: 0 | Status: SHIPPED | OUTPATIENT
Start: 2017-11-29 | End: 2017-11-30

## 2017-11-29 RX ORDER — AMOXICILLIN AND CLAVULANATE POTASSIUM 875; 125 MG/1; MG/1
1 TABLET, FILM COATED ORAL 2 TIMES DAILY
Qty: 10 TABLET | Refills: 0 | Status: SHIPPED | OUTPATIENT
Start: 2017-11-29 | End: 2017-12-04

## 2017-11-29 RX ADMIN — PIPERACILLIN SODIUM,TAZOBACTAM SODIUM 3.38 G: 3; .375 INJECTION, POWDER, FOR SOLUTION INTRAVENOUS at 03:52

## 2017-11-29 RX ADMIN — BUMETANIDE 2 MG: 1 TABLET ORAL at 08:15

## 2017-11-29 RX ADMIN — GUAIFENESIN 1200 MG: 600 TABLET, EXTENDED RELEASE ORAL at 08:15

## 2017-11-29 RX ADMIN — POTASSIUM CHLORIDE 40 MEQ: 1500 TABLET, EXTENDED RELEASE ORAL at 06:32

## 2017-11-29 RX ADMIN — POTASSIUM CHLORIDE 10 MEQ: 750 TABLET, FILM COATED, EXTENDED RELEASE ORAL at 08:15

## 2017-11-29 RX ADMIN — IPRATROPIUM BROMIDE AND ALBUTEROL SULFATE 3 ML: .5; 3 SOLUTION RESPIRATORY (INHALATION) at 06:45

## 2017-11-29 RX ADMIN — Medication 1 CAPSULE: at 08:15

## 2017-11-29 RX ADMIN — THYROID, PORCINE 60 MG: 60 TABLET ORAL at 06:32

## 2017-11-29 RX ADMIN — PREDNISONE 20 MG: 20 TABLET ORAL at 08:15

## 2017-11-29 RX ADMIN — BUDESONIDE AND FORMOTEROL FUMARATE DIHYDRATE 2 PUFF: 80; 4.5 AEROSOL RESPIRATORY (INHALATION) at 06:45

## 2017-11-29 RX ADMIN — FLUOXETINE HYDROCHLORIDE 20 MG: 20 CAPSULE ORAL at 08:15

## 2017-11-29 RX ADMIN — APIXABAN 5 MG: 5 TABLET, FILM COATED ORAL at 08:15

## 2017-11-29 RX ADMIN — FLECAINIDE ACETATE 50 MG: 50 TABLET ORAL at 08:15

## 2017-11-29 NOTE — DISCHARGE SUMMARY
Date of Admission: 11/21/2017    Date of Discharge:  11/29/2017    PCP: ALYSA Marrero    Admission Diagnosis:   Please see admission H&P    Discharge Diagnosis:   Acute hypoxic respiratory failure  Severe sepsis  Bilateral pneumonia, community acquired  Acute exacerbation of COPD  Acute on chronic diastolic CHF  Severe pulmonary HTN  Deconditioning   Hx of Afib       Procedures Performed:  None     Consults:   Consults     Date and Time Order Name Status Description    11/21/2017 9455 Hospitalist (on-call MD unless specified)              History of Present Illness:  Cari Harper is a 77 y.o. female who presented to [facility] ED with CC of fever, malaise and fatigue. She reported onset of symptoms approx 4 days prior to presentation with associated cough and shortness of breath. Please see admission H&P for complete details.     In the ED, she was febrile, tachypneic and hypoxic. Her BNP was elevated with CXR revealing probable pneumonia. Cultures were obtained and IV antibiotics initiated. She was given a dose of IV Bumex as well.        Hospital Course  Cari Harper was admitted to the telemetry floor for further evaluation and treatment. She was continued on IV antibiotics, steroids, nebs and supplemental O2.     Her renal function was slightly elevated upon admission but this improved and she was cautiously diuresed. Echo was obtained revealing an EF of 55-60%, grade II diastolic dysfunction, mild MR, moderate TR and severe pulmonary HTN with RVSP of 60-65. Atypical workup was negative. PT was consulted in the setting of deconditioning.     She was initially slow to clinically improve. A CT chest was obtained revealing bilateral pneumonia and her antibiotic regimen was broadened to include Zosyn. After broadening her antibiotics she began showing clinical improvement with improvement in her inflammatory markers. Her dyspnea and exercise tolerance improved and she was able to tolerate physical  therapy. Her steroids were tapered accordingly. Her CHF improved and she was transitioned back to her home PO Bumex. Repeat BNP and CXR were improved as well as her lower extremity edema.     Ms. Harper was seen and examined on 11/29/17 with RUTHIE Mckeon. She remained hemodynamically stable with stable routine AM labs. She felt much improved from upon admission and was deemed medically stable for discharge. The RN ambulated her in the hallway without supplemental O2 with her O2% dropping to <88%, thus qualifying her for home O2. This was arranged for upon discharge. She was prescribed Augmentin as well as a steroid taper and instructed to follow up with her PCP in 1 week.       Condition on Discharge:  Stable    Vital Signs  Vitals:    11/29/17 1040   BP: 120/50   Pulse: 62   Resp: 18   Temp: 97.5 °F (36.4 °C)   SpO2: 97%       Physical Exam:  General:    Awake, alert, in no acute distress   Heart:      Normal S1 and S2. Regular rate and rhythm. No significant murmur, rubs or gallops appreciated.   Lungs:     Respirations regular, even and unlabored. Diminished breath sounds throughout but overall improved. No wheezes appreciated.    Abdomen:   Soft and nontender. No guarding, rebound tenderness or  organomegaly noted. Bowel sounds present x 4.   Extremities:  1+ pitting edema (improving) in LE B/L. Moves UE and LE equally B/L.         Discharge Disposition:   home      Discharge Medications:   Cari Harper   Home Medication Instructions NIESHA:[id number]    Printed on:11/29/17 0356   Medication Information                      amoxicillin-clavulanate (AUGMENTIN) 875-125 MG per tablet  Take 1 tablet by mouth 2 (Two) Times a Day for 5 days.             apixaban (ELIQUIS) 5 MG tablet tablet  Take 1 tablet by mouth Every 12 (Twelve) Hours.             budesonide-formoterol (SYMBICORT) 80-4.5 MCG/ACT inhaler  Inhale 2 puffs 2 (Two) Times a Day.             bumetanide (BUMEX) 2 MG tablet  Take 2 mg by mouth Every  Morning.             clonazePAM (KlonoPIN) 0.5 MG tablet  Take 0.5 mg by mouth Every Morning.             clonazePAM (KlonoPIN) 1 MG tablet  Take 1 mg by mouth Every Night.             ferrous sulfate 325 (65 FE) MG tablet  Take 325 mg by mouth Every Evening.             flecainide (TAMBOCOR) 50 MG tablet  Take 1 tablet by mouth 2 (Two) Times a Day.             FLUoxetine (PROzac) 20 MG capsule  Take 20 mg by mouth 2 (two) times a day.             guaiFENesin (MUCINEX) 600 MG 12 hr tablet  Take 2 tablets by mouth Every 12 (Twelve) Hours for 5 days.             Omega-3 Fatty Acids (FISH OIL) 1200 MG capsule capsule  Take 2,400 mg by mouth Every Evening.             potassium chloride (K-DUR) 10 MEQ CR tablet  Take 1 tablet by mouth Daily.             predniSONE (DELTASONE) 10 MG tablet  20mg x 2 days, 10mg x 2 days, 5mg x 2 days, then stop.             Prenatal Vit-Fe Fumarate-FA (PRENATAL VITAMIN 27-0.8) 27-0.8 MG tablet tablet  Take 1 tablet by mouth Every Evening.             Thyroid 60 MG PO tablet  Take 60 mg by mouth Every Morning.                   Discharge Diet:   cardiac diet       Dietary Orders            Start     Ordered    11/22/17 0224  Diet Regular; Cardiac  Diet Effective Now     Question Answer Comment   Diet Texture / Consistency Regular    Common Modifiers Cardiac        11/22/17 0223          Activity at Discharge:  activity as tolerated    Follow-up Appointments:  Additional Instructions for the Follow-ups that You Need to Schedule     Discharge Follow-up with PCP    As directed    Follow Up Details:  Follow up with ALYSA Francisco in 1 week.             Follow-up Information     Follow up with ALYSA Marrero .    Specialty:  Nurse Practitioner    Why:  Follow up with ALYSA Francisco in 1 week.    Contact information:    [address] [place] [id number]  [phone]          Your Scheduled Appointments     May 17, 2018  1:15 PM EDT   Follow Up with JULIO Bryan    [facility] (--)    [address] [place] [id number] [phone]           Arrive 15 minutes prior to appointment.                    Test Results Pending at Discharge:  None     Walter Sanchez DO  11/29/17  10:59 AM      Time: Greater than 30 minutes spent on this discharge.

## 2017-11-29 NOTE — PLAN OF CARE
Problem: Fall Risk (Adult)  Goal: Absence of Falls  Outcome: Ongoing (interventions implemented as appropriate)    Problem: Pressure Ulcer Risk (Kerwin Scale) (Adult,Obstetrics,Pediatric)  Goal: Skin Integrity  Outcome: Ongoing (interventions implemented as appropriate)

## 2017-11-29 NOTE — PROGRESS NOTES
Discharge Planning Assessment  [facility]     Patient Name: Cari Harper  MRN: [id number]  Today's Date: 11/29/2017    Admit Date: 11/21/2017          Discharge Needs Assessment     None            Discharge Plan       11/29/17 1228    Final Note    Final Note Pt to be discharged home on this date with Physician ordered home 02.  Pt stated no preference for DME provider.  SS made referral to [employer].  [employer] to deliver portable tank to [facility] prior to discharge.  No further intervention needed.         Discharge Placement     No information found        Expected Discharge Date and Time     Expected Discharge Date Expected Discharge Time    Nov 29, 2017               Demographic Summary     None            Functional Status     None            Psychosocial     None            Abuse/Neglect     None            Legal     None            Substance Abuse     None            Patient Forms     None          Kandis Day

## 2017-11-29 NOTE — PLAN OF CARE
Problem: Patient Care Overview (Adult)  Goal: Plan of Care Review  Outcome: Ongoing (interventions implemented as appropriate)  Goal: Adult Individualization and Mutuality  Outcome: Ongoing (interventions implemented as appropriate)    Problem: Pneumonia (Adult)  Goal: Signs and Symptoms of Listed Potential Problems Will be Absent or Manageable (Pneumonia)  Outcome: Ongoing (interventions implemented as appropriate)    Problem: Fall Risk (Adult)  Goal: Absence of Falls  Outcome: Ongoing (interventions implemented as appropriate)    Problem: Pressure Ulcer Risk (Kerwin Scale) (Adult,Obstetrics,Pediatric)  Goal: Skin Integrity  Outcome: Ongoing (interventions implemented as appropriate)

## 2018-01-26 ENCOUNTER — TELEPHONE (OUTPATIENT)
Dept: CARDIOLOGY | Facility: CLINIC | Age: 78
End: 2018-01-26

## 2018-01-26 DIAGNOSIS — I48.0 PAROXYSMAL ATRIAL FIBRILLATION (HCC): ICD-10-CM

## 2018-01-26 DIAGNOSIS — R06.02 SHORTNESS OF BREATH: ICD-10-CM

## 2018-01-26 DIAGNOSIS — I50.9 CONGESTIVE HEART FAILURE, UNSPECIFIED CONGESTIVE HEART FAILURE CHRONICITY, UNSPECIFIED CONGESTIVE HEART FAILURE TYPE: ICD-10-CM

## 2018-01-26 RX ORDER — POTASSIUM CHLORIDE 750 MG/1
10 TABLET, FILM COATED, EXTENDED RELEASE ORAL DAILY
Qty: 30 TABLET | Refills: 11 | Status: SHIPPED | OUTPATIENT
Start: 2018-01-26 | End: 2020-03-17 | Stop reason: SDUPTHER

## 2018-01-26 RX ORDER — FLECAINIDE ACETATE 50 MG/1
50 TABLET ORAL EVERY 12 HOURS SCHEDULED
Qty: 60 TABLET | Refills: 11 | Status: SHIPPED | OUTPATIENT
Start: 2018-01-26 | End: 2020-03-17 | Stop reason: SDUPTHER

## 2018-01-26 RX ORDER — BUMETANIDE 2 MG/1
2 TABLET ORAL DAILY
Qty: 30 TABLET | Refills: 11 | Status: SHIPPED | OUTPATIENT
Start: 2018-01-26 | End: 2020-03-17 | Stop reason: SDUPTHER

## 2018-01-26 NOTE — TELEPHONE ENCOUNTER
----- Message from Rozina Ewing sent at 1/26/2018 10:13 AM EST -----  Medication(s): FLECANIDE, POTASSIUM, WATER PILL  Pharmacy: [facility]

## 2018-03-30 ENCOUNTER — OFFICE VISIT (OUTPATIENT)
Dept: CARDIOLOGY | Facility: CLINIC | Age: 78
End: 2018-03-30

## 2018-03-30 VITALS
DIASTOLIC BLOOD PRESSURE: 44 MMHG | OXYGEN SATURATION: 91 % | WEIGHT: 187.6 LBS | SYSTOLIC BLOOD PRESSURE: 120 MMHG | HEART RATE: 59 BPM | HEIGHT: 61 IN | BODY MASS INDEX: 35.42 KG/M2

## 2018-03-30 DIAGNOSIS — I48.0 PAROXYSMAL ATRIAL FIBRILLATION (HCC): Primary | ICD-10-CM

## 2018-03-30 DIAGNOSIS — R53.1 WEAKNESS: ICD-10-CM

## 2018-03-30 DIAGNOSIS — R06.02 SHORTNESS OF BREATH: ICD-10-CM

## 2018-03-30 PROCEDURE — 93000 ELECTROCARDIOGRAM COMPLETE: CPT | Performed by: PHYSICIAN ASSISTANT

## 2018-03-30 PROCEDURE — 99213 OFFICE O/P EST LOW 20 MIN: CPT | Performed by: PHYSICIAN ASSISTANT

## 2018-03-30 RX ORDER — ACETAMINOPHEN 500 MG
500 TABLET ORAL EVERY 6 HOURS PRN
COMMUNITY

## 2018-03-30 RX ORDER — AZITHROMYCIN 500 MG/1
TABLET, FILM COATED ORAL
Qty: 6 TABLET | Refills: 0 | Status: SHIPPED | OUTPATIENT
Start: 2018-03-30 | End: 2018-04-18

## 2018-03-30 RX ORDER — CETIRIZINE HYDROCHLORIDE 5 MG/1
5 TABLET ORAL DAILY
COMMUNITY
End: 2018-04-18

## 2018-03-30 NOTE — PROGRESS NOTES
Problem list     Subjective   Cari Harper is a 77 y.o. female     Chief Complaint   Patient presents with   • Shortness of Breath   • Atrial Fibrillation     doing well since last visit    • Hypertension     doing well since last visit    • Extremity Weakness     had double pneumonia a few months ago    Problem List:  1. Paroxysmal atrial fibrillation  2. Preserved systolic function  3. Low risk stress test with no evidence of ischemia.  4. Hypertension  5. Mild pulmonary hypertension, PA systolic pressures 40-45 mmHg  6. Dyslipidemia  7. Hypothyroidism  8. First degree AVB.    HPI  The patient presents in today for follow-up.  Since last appointment here, she has continued to do recently well from cardiovascular standpoint.  Unfortunately, she has started having falls.  She reports that she has acute onset weakness.  She has no dysrhythmic symptoms with that.  She has no chest pain.  She reports no significant dyspnea.  She reports that this is basically been occurring since onset of pneumonia back in November.  She is concerned because of recurrent respiratory type infection symptoms and she would like antibiotic therapy.  Again since starting flecainide therapy, the patient has had no further dysrhythmic symptoms.  She relates to no bleeding complications with Eliquis.  She has no further complaints otherwise.    Current Outpatient Prescriptions   Medication Sig Dispense Refill   • acetaminophen (TYLENOL) 500 MG tablet Take 500 mg by mouth Every 6 (Six) Hours As Needed for Mild Pain .     • apixaban (ELIQUIS) 5 MG tablet tablet Take 1 tablet by mouth Every 12 (Twelve) Hours. 60 tablet 5   • bumetanide (BUMEX) 2 MG tablet Take 1 tablet by mouth Daily. 30 tablet 11   • cetirizine (zyrTEC) 5 MG tablet Take 5 mg by mouth Daily.     • clonazePAM (KlonoPIN) 0.5 MG tablet Take 0.5 mg by mouth Every Morning.     • clonazePAM (KlonoPIN) 1 MG tablet Take 1 mg by mouth Every Night.     • ferrous sulfate 325 (65 FE) MG  tablet Take 325 mg by mouth Every Evening.     • flecainide (TAMBOCOR) 50 MG tablet Take 1 tablet by mouth Every 12 (Twelve) Hours. 60 tablet 11   • FLUoxetine (PROzac) 20 MG capsule Take 20 mg by mouth 2 (two) times a day.     • Omega-3 Fatty Acids (FISH OIL) 1200 MG capsule capsule Take 2,400 mg by mouth Every Evening.     • potassium chloride (K-DUR) 10 MEQ CR tablet Take 1 tablet by mouth Daily. 30 tablet 11   • Prenatal Vit-Fe Fumarate-FA (PRENATAL VITAMIN 27-0.8) 27-0.8 MG tablet tablet Take 1 tablet by mouth Every Evening.     • Thyroid 60 MG PO tablet Take 60 mg by mouth Every Morning.       No current facility-administered medications for this visit.        Review of patient's allergies indicates no known allergies.    Past Medical History:   Diagnosis Date   • Anxiety    • Arthritis    • Atrial fibrillation    • Coronary artery disease    • Disease of thyroid gland    • DVT (deep venous thrombosis)    • Hyperlipidemia    • Hypertension        Social History     Social History   • Marital status: Unknown     Spouse name: N/A   • Number of children: N/A   • Years of education: N/A     Occupational History   • Not on file.     Social History Main Topics   • Smoking status: Former Smoker     Packs/day: 1.00     Years: 0.00     Types: Cigarettes     Start date: 8/11/1961     Quit date: 8/11/1986   • Smokeless tobacco: Never Used   • Alcohol use No   • Drug use: No   • Sexual activity: Defer     Other Topics Concern   • Not on file     Social History Narrative   • No narrative on file       Family History   Problem Relation Age of Onset   • Cancer Father    • No Known Problems Mother        Review of Systems   Constitutional: Positive for fatigue. Negative for chills, diaphoresis and fever.   HENT: Positive for ear pain (left ear). Negative for congestion, dental problem, drooling, ear discharge, facial swelling, hearing loss, nosebleeds, postnasal drip, rhinorrhea, sinus pain, sinus pressure, sneezing, sore  "throat, tinnitus, trouble swallowing and voice change.    Eyes: Negative for visual disturbance.   Respiratory: Positive for shortness of breath. Negative for cough, choking, chest tightness, wheezing and stridor.    Cardiovascular: Positive for leg swelling. Negative for chest pain and palpitations.   Gastrointestinal: Positive for constipation (takes mirlax ). Negative for abdominal pain, blood in stool (no melena, no hematuria, no hematemesis, no hemaotchezia ), diarrhea, nausea and vomiting.   Endocrine: Negative for cold intolerance and heat intolerance.   Genitourinary: Negative for difficulty urinating.   Musculoskeletal: Positive for arthralgias.   Skin: Negative for color change, pallor, rash and wound.   Allergic/Immunologic: Negative for environmental allergies, food allergies and immunocompromised state.   Neurological: Positive for light-headedness (imbalanced, occasional falls due to weakness from hospitalization ). Negative for dizziness, tremors, seizures, syncope, facial asymmetry (no stroke like symptoms), speech difficulty, weakness, numbness and headaches.   Hematological: Negative for adenopathy. Does not bruise/bleed easily.       Objective   Vitals:    03/30/18 1056   BP: 120/44   BP Location: Left arm   Patient Position: Sitting   Pulse: 59   SpO2: 91%   Weight: 85.1 kg (187 lb 9.6 oz)   Height: 154.9 cm (61\")      /44 (BP Location: Left arm, Patient Position: Sitting)   Pulse 59   Ht 154.9 cm (61\")   Wt 85.1 kg (187 lb 9.6 oz)   SpO2 91%   BMI 35.45 kg/m²    Lab Results (most recent)     None        Physical Exam   Constitutional: She is oriented to person, place, and time. She appears well-developed and well-nourished. No distress.   HENT:   Head: Normocephalic and atraumatic.   Eyes: Conjunctivae and EOM are normal. Pupils are equal, round, and reactive to light.   Neck: Normal range of motion. Neck supple. No JVD present. No tracheal deviation present.   Cardiovascular: Normal " rate, regular rhythm, normal heart sounds and intact distal pulses.    Pulmonary/Chest: Effort normal. She has decreased breath sounds. She has no rales (Minimal bibasilar rales).   Abdominal: Soft. Bowel sounds are normal. She exhibits no distension and no mass. There is no tenderness. There is no rebound and no guarding.   Musculoskeletal: Normal range of motion. She exhibits edema. She exhibits no tenderness or deformity.   Neurological: She is alert and oriented to person, place, and time.   Skin: Skin is warm and dry. No rash noted. No erythema. No pallor.   Psychiatric: She has a normal mood and affect. Her behavior is normal. Judgment and thought content normal.   Nursing note and vitals reviewed.        Procedure     ECG 12 Lead  Date/Time: 3/30/2018 11:12 AM  Performed by: DANIS YO  Authorized by: DANIS YO   Comments: Sinus rhythm with artifact, QT/QTC intervals appear stable, no acute changes noted.               Assessment/Plan      Diagnosis Plan   1. Paroxysmal atrial fibrillation     2. Shortness of breath     3. Weakness         The patient appears stable from cardiovascular standpoint.  I will make no changes for now.  For increasing issues with falls, we may have to consider discontinuation of anticoagulation therapy.  For now, I will continue that and was clinical course dictates otherwise.  We will send her anabolic therapy and at her request because of upper respiratory tract infection symptoms.  She will call for any issues, otherwise we'll see her back in 6 months.              Discussed the patient's BMI with her. BMI is above normal parameters. Follow-up plan includes:  educational material.             Electronically signed by:

## 2018-03-30 NOTE — PATIENT INSTRUCTIONS
"Fat and Cholesterol Restricted Diet  Getting too much fat and cholesterol in your diet may cause health problems. Following this diet helps keep your fat and cholesterol at normal levels. This can keep you from getting sick.  What types of fat should I choose?  · Choose monosaturated and polyunsaturated fats. These are found in foods such as olive oil, canola oil, flaxseeds, walnuts, almonds, and seeds.  · Eat more omega-3 fats. Good choices include salmon, mackerel, sardines, tuna, flaxseed oil, and ground flaxseeds.  · Limit saturated fats. These are in animal products such as meats, butter, and cream. They can also be in plant products such as palm oil, palm kernel oil, and coconut oil.  · Avoid foods with partially hydrogenated oils in them. These contain trans fats. Examples of foods that have trans fats are stick margarine, some tub margarines, cookies, crackers, and other baked goods.  What general guidelines do I need to follow?  · Check food labels. Look for the words \"trans fat\" and \"saturated fat.\"  · When preparing a meal:  ¨ Fill half of your plate with vegetables and green salads.  ¨ Fill one fourth of your plate with whole grains. Look for the word \"whole\" as the first word in the ingredient list.  ¨ Fill one fourth of your plate with lean protein foods.  · Eat more foods that have fiber, like apples, carrots, beans, peas, and barley.  · Eat more home-cooked foods. Eat less at restaurants and buffets.  · Limit or avoid alcohol.  · Limit foods high in starch and sugar.  · Limit fried foods.  · Cook foods without frying them. Baking, boiling, grilling, and broiling are all great options.  · Lose weight if you are overweight. Losing even a small amount of weight can help your overall health. It can also help prevent diseases such as diabetes and heart disease.  What foods can I eat?  Grains   Whole grains, such as whole wheat or whole grain breads, crackers, cereals, and pasta. Unsweetened oatmeal, " bulgur, barley, quinoa, or brown rice. Corn or whole wheat flour tortillas.  Vegetables   Fresh or frozen vegetables (raw, steamed, roasted, or grilled). Green salads.  Fruits   All fresh, canned (in natural juice), or frozen fruits.  Meat and Other Protein Products   Ground beef (85% or leaner), grass-fed beef, or beef trimmed of fat. Skinless chicken or turkey. Ground chicken or turkey. Pork trimmed of fat. All fish and seafood. Eggs. Dried beans, peas, or lentils. Unsalted nuts or seeds. Unsalted canned or dry beans.  Dairy   Low-fat dairy products, such as skim or 1% milk, 2% or reduced-fat cheeses, low-fat ricotta or cottage cheese, or plain low-fat yogurt.  Fats and Oils   Tub margarines without trans fats. Light or reduced-fat mayonnaise and salad dressings. Avocado. Olive, canola, sesame, or safflower oils. Natural peanut or almond butter (choose ones without added sugar and oil).  The items listed above may not be a complete list of recommended foods or beverages. Contact your dietitian for more options.   What foods are not recommended?  Grains   White bread. White pasta. White rice. Cornbread. Bagels, pastries, and croissants. Crackers that contain trans fat.  Vegetables   White potatoes. Corn. Creamed or fried vegetables. Vegetables in a cheese sauce.  Fruits   Dried fruits. Canned fruit in light or heavy syrup. Fruit juice.  Meat and Other Protein Products   Fatty cuts of meat. Ribs, chicken wings, simpson, sausage, bologna, salami, chitterlings, fatback, hot dogs, bratwurst, and packaged luncheon meats. Liver and organ meats.  Dairy   Whole or 2% milk, cream, half-and-half, and cream cheese. Whole milk cheeses. Whole-fat or sweetened yogurt. Full-fat cheeses. Nondairy creamers and whipped toppings. Processed cheese, cheese spreads, or cheese curds.  Sweets and Desserts   Corn syrup, sugars, honey, and molasses. Candy. Jam and jelly. Syrup. Sweetened cereals. Cookies, pies, cakes, donuts, muffins, and ice  cream.  Fats and Oils   Butter, stick margarine, lard, shortening, ghee, or simpson fat. Coconut, palm kernel, or palm oils.  Beverages   Alcohol. Sweetened drinks (such as sodas, lemonade, and fruit drinks or punches).  The items listed above may not be a complete list of foods and beverages to avoid. Contact your dietitian for more information.   This information is not intended to replace advice given to you by your health care provider. Make sure you discuss any questions you have with your health care provider.  Document Released: 06/18/2013 Document Revised: 08/24/2017 Document Reviewed: 03/19/2015  [employer] Interactive Patient Education © 2017 [employer]  BMI for Adults  Body mass index (BMI) is a number that is calculated from a person's weight and height. In most adults, the number is used to find how much of an adult's weight is made up of fat. BMI is not as accurate as a direct measure of body fat.  How is BMI calculated?  BMI is calculated by dividing weight in kilograms by height in meters squared. It can also be calculated by dividing weight in pounds by height in inches squared, then multiplying the resulting number by 703. Charts are available to help you find your BMI quickly and easily without doing this calculation.  How is BMI interpreted?  Health care professionals use BMI charts to identify whether an adult is underweight, at a normal weight, or overweight based on the following guidelines:  · Underweight: BMI less than 18.5.  · Normal weight: BMI between 18.5 and 24.9.  · Overweight: BMI between 25 and 29.9.  · Obese: BMI of 30 and above.  BMI is usually interpreted the same for males and females.  Weight includes both fat and muscle, so someone with a muscular build, such as an athlete, may have a BMI that is higher than 24.9. In cases like these, BMI may not accurately depict body fat. To determine if excess body fat is the cause of a BMI of 25 or higher, further assessments may need to be  done by a health care provider.  Why is BMI a useful tool?  BMI is used to identify a possible weight problem that may be related to a medical problem or may increase the risk for medical problems. BMI can also be used to promote changes to reach a healthy weight.  This information is not intended to replace advice given to you by your health care provider. Make sure you discuss any questions you have with your health care provider.  Document Released: 08/29/2005 Document Revised: 04/27/2017 Document Reviewed: 05/15/2015  [employer] Interactive Patient Education © 2017 [employer]

## 2018-04-18 ENCOUNTER — OFFICE VISIT (OUTPATIENT)
Dept: CARDIOLOGY | Facility: CLINIC | Age: 78
End: 2018-04-18

## 2018-04-18 ENCOUNTER — APPOINTMENT (OUTPATIENT)
Dept: LAB | Facility: HOSPITAL | Age: 78
End: 2018-04-18

## 2018-04-18 ENCOUNTER — OUTSIDE FACILITY SERVICE (OUTPATIENT)
Dept: CARDIOLOGY | Facility: CLINIC | Age: 78
End: 2018-04-18

## 2018-04-18 ENCOUNTER — HOSPITAL ENCOUNTER (OUTPATIENT)
Dept: CARDIOLOGY | Facility: HOSPITAL | Age: 78
Discharge: HOME | End: 2018-04-18
Admitting: PHYSICIAN ASSISTANT

## 2018-04-18 VITALS
OXYGEN SATURATION: 97 % | BODY MASS INDEX: 35.68 KG/M2 | HEART RATE: 64 BPM | DIASTOLIC BLOOD PRESSURE: 55 MMHG | WEIGHT: 189 LBS | HEIGHT: 61 IN | SYSTOLIC BLOOD PRESSURE: 124 MMHG

## 2018-04-18 DIAGNOSIS — I48.0 PAROXYSMAL ATRIAL FIBRILLATION (HCC): ICD-10-CM

## 2018-04-18 DIAGNOSIS — I50.32 CHRONIC DIASTOLIC CONGESTIVE HEART FAILURE (HCC): Primary | ICD-10-CM

## 2018-04-18 DIAGNOSIS — R06.09 DYSPNEA ON EXERTION: ICD-10-CM

## 2018-04-18 DIAGNOSIS — I10 ESSENTIAL HYPERTENSION: ICD-10-CM

## 2018-04-18 LAB
ANION GAP SERPL CALCULATED.3IONS-SCNC: 10.3 MMOL/L (ref 3.6–11.2)
BUN BLD-MCNC: 28 MG/DL (ref 7–21)
BUN/CREAT SERPL: 28.6 (ref 7–25)
CALCIUM SPEC-SCNC: 9.6 MG/DL (ref 7.7–10)
CHLORIDE SERPL-SCNC: 103 MMOL/L (ref 99–112)
CO2 SERPL-SCNC: 28.7 MMOL/L (ref 24.3–31.9)
CREAT BLD-MCNC: 0.98 MG/DL (ref 0.43–1.29)
GFR SERPL CREATININE-BSD FRML MDRD: 55 ML/MIN/1.73
GLUCOSE BLD-MCNC: 98 MG/DL (ref 70–110)
MAXIMAL PREDICTED HEART RATE: 141 BPM
OSMOLALITY SERPL CALC.SUM OF ELEC: 288.6 MOSM/KG (ref 273–305)
POTASSIUM BLD-SCNC: 4 MMOL/L (ref 3.5–5.3)
SODIUM BLD-SCNC: 142 MMOL/L (ref 135–153)
STRESS TARGET HR: 120 BPM

## 2018-04-18 PROCEDURE — 80048 BASIC METABOLIC PNL TOTAL CA: CPT | Performed by: PHYSICIAN ASSISTANT

## 2018-04-18 PROCEDURE — 93306 TTE W/DOPPLER COMPLETE: CPT | Performed by: INTERNAL MEDICINE

## 2018-04-18 PROCEDURE — 93306 TTE W/DOPPLER COMPLETE: CPT

## 2018-04-18 PROCEDURE — 99213 OFFICE O/P EST LOW 20 MIN: CPT | Performed by: PHYSICIAN ASSISTANT

## 2018-04-18 PROCEDURE — 83880 ASSAY OF NATRIURETIC PEPTIDE: CPT | Performed by: PHYSICIAN ASSISTANT

## 2018-04-18 RX ORDER — DOXYCYCLINE HYCLATE 100 MG/1
100 CAPSULE ORAL 2 TIMES DAILY
COMMUNITY
End: 2018-11-11

## 2018-04-18 RX ORDER — LORATADINE 10 MG/1
10 TABLET ORAL DAILY
COMMUNITY
End: 2019-08-21

## 2018-04-18 RX ORDER — FERROUS SULFATE 325(65) MG
325 TABLET ORAL 2 TIMES DAILY
COMMUNITY
End: 2019-08-21

## 2018-04-18 NOTE — PATIENT INSTRUCTIONS

## 2018-04-18 NOTE — PROGRESS NOTES
Problem list     Subjective   Cari Harper is a 78 y.o. female     Chief Complaint   Patient presents with   • Shortness of Breath     patient appears in office today stating she is having increased SOA    Problem List:  1. Paroxysmal atrial fibrillation  2. Preserved systolic function  3. Low risk stress test with no evidence of ischemia.  4. Hypertension  5. Mild pulmonary hypertension, PA systolic pressures 40-45 mmHg  6. Dyslipidemia  7. Hypothyroidism  8. First degree AVB.       HPI  The patient presents back today at the request of her primary care provider.  She was recently seen because of increasing shortness of air.  There is concern of recurrent pneumonia.  She apparently had evaluation through her pulmonologist who felt that she had worsened cardiomegaly by chest x-ray.  She had an echo however in November which suggested stable cardiac parameters.  Irregardless, the chest x-ray also suggested mild pulmonary congestion suggesting mild failure.  The patient has since been referred back to the clinic.  She does tell me that she does not do that well with sodium restriction.  Diuretic therapy was increased to her primary care provider and she has felt much improved.  She still has shortness of air when she exerts however.  She relates to no edema.  She has had no PND orthopnea.  She has no further complaints otherwise.    Current Outpatient Prescriptions   Medication Sig Dispense Refill   • acetaminophen (TYLENOL) 500 MG tablet Take 500 mg by mouth Every 6 (Six) Hours As Needed for Mild Pain .     • apixaban (ELIQUIS) 5 MG tablet tablet Take 1 tablet by mouth Every 12 (Twelve) Hours. 60 tablet 5   • bumetanide (BUMEX) 2 MG tablet Take 1 tablet by mouth Daily. 30 tablet 11   • clonazePAM (KlonoPIN) 0.5 MG tablet Take 0.5 mg by mouth Every Morning.     • clonazePAM (KlonoPIN) 1 MG tablet Take 1 mg by mouth Every Night.     • doxycycline (VIBRAMYCIN) 100 MG capsule Take 100 mg by mouth 2 (Two) Times a Day.      • ferrous sulfate 325 (65 FE) MG tablet Take 325 mg by mouth Daily With Breakfast.     • flecainide (TAMBOCOR) 50 MG tablet Take 1 tablet by mouth Every 12 (Twelve) Hours. 60 tablet 11   • FLUoxetine (PROzac) 20 MG capsule Take 20 mg by mouth 2 (two) times a day.     • loratadine (CLARITIN) 10 MG tablet Take 10 mg by mouth Daily.     • Omega-3 Fatty Acids (FISH OIL) 1200 MG capsule capsule Take 2,400 mg by mouth Every Evening.     • potassium chloride (K-DUR) 10 MEQ CR tablet Take 1 tablet by mouth Daily. 30 tablet 11   • Prenatal Vit-Fe Fumarate-FA (PRENATAL VITAMIN 27-0.8) 27-0.8 MG tablet tablet Take 1 tablet by mouth Every Evening.     • Thyroid 60 MG PO tablet Take 60 mg by mouth Every Morning.       No current facility-administered medications for this visit.        Review of patient's allergies indicates no known allergies.    Past Medical History:   Diagnosis Date   • Anxiety    • Arthritis    • Atrial fibrillation    • Coronary artery disease    • Disease of thyroid gland    • DVT (deep venous thrombosis)    • Hyperlipidemia    • Hypertension        Social History     Social History   • Marital status: Unknown     Spouse name: N/A   • Number of children: N/A   • Years of education: N/A     Occupational History   • Not on file.     Social History Main Topics   • Smoking status: Former Smoker     Packs/day: 1.00     Years: 0.00     Types: Cigarettes     Start date: 8/11/1961     Quit date: 8/11/1986   • Smokeless tobacco: Never Used   • Alcohol use No   • Drug use: No   • Sexual activity: Defer     Other Topics Concern   • Not on file     Social History Narrative   • No narrative on file       Family History   Problem Relation Age of Onset   • Cancer Father    • No Known Problems Mother        Review of Systems   Constitutional: Positive for fatigue (easily fatigued).   HENT: Positive for rhinorrhea (runny nose due to weather and allergies). Negative for congestion, sneezing and sore throat.    Eyes:  "Negative.  Negative for visual disturbance.   Respiratory: Positive for cough (cough with chest congestion ) and shortness of breath (worsening SOA). Negative for apnea, chest tightness and wheezing.    Cardiovascular: Positive for leg swelling (BLE swelling/edema). Negative for chest pain (denies CP) and palpitations (denies palpitations).   Gastrointestinal: Negative.  Negative for abdominal distention, abdominal pain, nausea and vomiting.   Endocrine: Negative.  Negative for cold intolerance, heat intolerance, polyphagia and polyuria.   Genitourinary: Negative.  Negative for difficulty urinating, frequency and urgency.   Musculoskeletal: Positive for back pain (lower back pain ). Negative for arthralgias, myalgias, neck pain and neck stiffness.   Skin: Negative.  Negative for rash and wound.   Allergic/Immunologic: Positive for environmental allergies (seasonal allergies). Negative for food allergies.   Neurological: Positive for light-headedness (occasional light headedness). Negative for dizziness, weakness and headaches.   Hematological: Bruises/bleeds easily (bruises and bleeds easily).   Psychiatric/Behavioral: Positive for confusion (easily confused). Negative for agitation and sleep disturbance (denies waking up smothering/SOA). The patient is not nervous/anxious.        Objective   Vitals:    04/18/18 1016   BP: 124/55   BP Location: Left arm   Patient Position: Sitting   Pulse: 64   SpO2: 97%   Weight: 85.7 kg (189 lb)   Height: 154.9 cm (61\")      /55 (BP Location: Left arm, Patient Position: Sitting)   Pulse 64   Ht 154.9 cm (61\")   Wt 85.7 kg (189 lb)   SpO2 97%   BMI 35.71 kg/m²    Lab Results (most recent)     None        Physical Exam   Constitutional: She is oriented to person, place, and time. She appears well-developed and well-nourished. No distress.   HENT:   Head: Normocephalic and atraumatic.   Eyes: Conjunctivae and EOM are normal. Pupils are equal, round, and reactive to light. "   Neck: Normal range of motion. Neck supple. No JVD present. No tracheal deviation present.   Cardiovascular: Normal rate, regular rhythm, normal heart sounds and intact distal pulses.    Pulmonary/Chest: Effort normal. She has decreased breath sounds. She has no rales (Minimal bibasilar rales).   Abdominal: Soft. Bowel sounds are normal. She exhibits no distension and no mass. There is no tenderness. There is no rebound and no guarding.   Musculoskeletal: Normal range of motion. She exhibits edema. She exhibits no tenderness or deformity.   Neurological: She is alert and oriented to person, place, and time.   Skin: Skin is warm and dry. No rash noted. No erythema. No pallor.   Psychiatric: She has a normal mood and affect. Her behavior is normal. Judgment and thought content normal.   Nursing note and vitals reviewed.        Procedure   Procedures       Assessment/Plan      Diagnosis Plan   1. Chronic diastolic congestive heart failure  Adult Transthoracic Echo Complete W/ Cont if Necessary Per Protocol    proBNP    Basic Metabolic Panel   2. Paroxysmal atrial fibrillation  Adult Transthoracic Echo Complete W/ Cont if Necessary Per Protocol    proBNP    Basic Metabolic Panel   3. Essential hypertension  Adult Transthoracic Echo Complete W/ Cont if Necessary Per Protocol    proBNP    Basic Metabolic Panel   4. Dyspnea on exertion  Adult Transthoracic Echo Complete W/ Cont if Necessary Per Protocol    proBNP    Basic Metabolic Panel         The patient will be scheduled for an echo because of recent diastolic heart failure.  We discussed titration of diuretic therapy for failure symptoms.  We discussed daily weights, with the addition of diuretic therapy/titration of diuretic therapy if needed for greater than 2 pound weight gain.  We have discussed this with family members as well understand fully.  She will take an extra potassium should she have to take an extra Bumex.  I would like to schedule for a BMP and BNP  today.  We will see her back to review results of echo.  If she has complications with titration of diuretic therapy or failure symptoms otherwise, she will call.       Patient's Body mass index is 35.71 kg/m². BMI is above normal parameters. Follow-up plan includes:  educational material and referral to primary care.             Electronically signed by:

## 2018-04-19 LAB — NT-PROBNP SERPL-MCNC: 1607 PG/ML (ref 0–738)

## 2018-04-25 ENCOUNTER — DOCUMENTATION (OUTPATIENT)
Dept: CARDIOLOGY | Facility: CLINIC | Age: 78
End: 2018-04-25

## 2018-04-25 NOTE — PROGRESS NOTES
Attempted to call son's number in chart to discuss echo results, no answer @ 1024 AM. -CECILE;JAMES

## 2018-05-02 ENCOUNTER — DOCUMENTATION (OUTPATIENT)
Dept: CARDIOLOGY | Facility: CLINIC | Age: 78
End: 2018-05-02

## 2018-05-02 NOTE — PROGRESS NOTES
Called and spoke with patients son, Kain, and notified him of patients echo results. -CECILE;JAMES

## 2018-07-10 ENCOUNTER — TELEPHONE (OUTPATIENT)
Dept: CARDIOLOGY | Facility: CLINIC | Age: 78
End: 2018-07-10

## 2018-07-10 ENCOUNTER — OFFICE VISIT (OUTPATIENT)
Dept: CARDIOLOGY | Facility: CLINIC | Age: 78
End: 2018-07-10

## 2018-07-10 VITALS
HEIGHT: 61 IN | DIASTOLIC BLOOD PRESSURE: 47 MMHG | SYSTOLIC BLOOD PRESSURE: 124 MMHG | BODY MASS INDEX: 35.27 KG/M2 | HEART RATE: 60 BPM | WEIGHT: 186.8 LBS | OXYGEN SATURATION: 94 %

## 2018-07-10 DIAGNOSIS — R06.02 SHORTNESS OF BREATH: Primary | ICD-10-CM

## 2018-07-10 DIAGNOSIS — I48.0 PAROXYSMAL ATRIAL FIBRILLATION (HCC): ICD-10-CM

## 2018-07-10 DIAGNOSIS — I10 ESSENTIAL HYPERTENSION: ICD-10-CM

## 2018-07-10 PROCEDURE — 99213 OFFICE O/P EST LOW 20 MIN: CPT | Performed by: PHYSICIAN ASSISTANT

## 2018-07-10 NOTE — TELEPHONE ENCOUNTER
----- Message from Melida Wbeb MA sent at 7/10/2018  3:00 PM EDT -----  Contact: KESHIA (SON)      ----- Message -----  From: Anila Armas  Sent: 7/10/2018   2:49 PM  To: Mge Card Aga Fitch [facility]    THE PATIENTS SON CALLED AND STATES HE WAS SUPPOSED TO CALL DANIS BACK ABOUT A CT SCAN THEY WERE GETTING THE RESULTS FROM DR DEAN.  THAT OFFICE IS SUPPOSED TO FAX US A COPY OF THAT REPORT.  HE STATES THE SCARRING LOOKED MUCH BETTER THIS TIME.  IF DANIS HAS ANY OTHER QUESTIONS THEY CAN BE REACHED [phone].  THANKS

## 2018-07-10 NOTE — PROGRESS NOTES
"Problem list     Subjective   Cari Harper is a 78 y.o. female     Chief Complaint   Patient presents with   • Follow-up     patient appears in office today for follow up test results   • Congestive Heart Failure   Problem List:  1. Paroxysmal atrial fibrillation  2. Preserved systolic function  3. Low risk stress test with no evidence of ischemia.  4. Hypertension  5. Mild pulmonary hypertension, PA systolic pressures 40-45 mmHg  6. Dyslipidemia  7. Hypothyroidism  8. First degree AVB.    HPI   The patient presents in today for follow-up of echo.  We had seen her because of significant shortness of air.  She was seen through her pulmonology team and not felt to have a significant pulmonary etiology to explain worsening symptoms/persistent symptoms.  A chest x-ray was performed through her pulmonology service and was felt to represent \"increasing cardiomegaly\".  She was advised to seek further evaluation and treatment of her symptoms through the clinic.  We did see her in repeat an echo.  Echo parameters were completely stable.  She then had a follow-up CT of the chest.  That suggested a possible pulmonary fibrosis.  She is scheduled to see her primary care and her pulmonologist for evaluation of the same.  The patient currently has no chest pain.  She titrates diuretic regimen at home as needed for failure symptoms and edema.  She has no further complaints otherwise and feels that she is very much stable from cardiovascular standpoint.      Current Outpatient Prescriptions   Medication Sig Dispense Refill   • acetaminophen (TYLENOL) 500 MG tablet Take 500 mg by mouth Every 6 (Six) Hours As Needed for Mild Pain .     • apixaban (ELIQUIS) 5 MG tablet tablet Take 1 tablet by mouth Every 12 (Twelve) Hours. 60 tablet 5   • bumetanide (BUMEX) 2 MG tablet Take 1 tablet by mouth Daily. 30 tablet 11   • clonazePAM (KlonoPIN) 0.5 MG tablet Take 0.5 mg by mouth Every Morning.     • clonazePAM (KlonoPIN) 1 MG tablet Take 1 mg " by mouth Every Night.     • doxycycline (VIBRAMYCIN) 100 MG capsule Take 100 mg by mouth 2 (Two) Times a Day.     • ferrous sulfate 325 (65 FE) MG tablet Take 325 mg by mouth Daily With Breakfast.     • flecainide (TAMBOCOR) 50 MG tablet Take 1 tablet by mouth Every 12 (Twelve) Hours. 60 tablet 11   • FLUoxetine (PROzac) 20 MG capsule Take 20 mg by mouth 2 (two) times a day.     • loratadine (CLARITIN) 10 MG tablet Take 10 mg by mouth Daily.     • Omega-3 Fatty Acids (FISH OIL) 1200 MG capsule capsule Take 2,400 mg by mouth Every Evening.     • potassium chloride (K-DUR) 10 MEQ CR tablet Take 1 tablet by mouth Daily. 30 tablet 11   • Prenatal Vit-Fe Fumarate-FA (PRENATAL VITAMIN 27-0.8) 27-0.8 MG tablet tablet Take 1 tablet by mouth Every Evening.     • Thyroid 60 MG PO tablet Take 60 mg by mouth Every Morning.       No current facility-administered medications for this visit.        Patient has no known allergies.    Past Medical History:   Diagnosis Date   • Anxiety    • Arthritis    • Atrial fibrillation (CMS/HCC)    • Coronary artery disease    • Disease of thyroid gland    • DVT (deep venous thrombosis) (CMS/HCC)    • Hyperlipidemia    • Hypertension        Social History     Social History   • Marital status: Unknown     Spouse name: N/A   • Number of children: N/A   • Years of education: N/A     Occupational History   • Not on file.     Social History Main Topics   • Smoking status: Former Smoker     Packs/day: 1.00     Years: 0.00     Types: Cigarettes     Start date: 8/11/1961     Quit date: 8/11/1986   • Smokeless tobacco: Never Used   • Alcohol use No   • Drug use: No   • Sexual activity: Defer     Other Topics Concern   • Not on file     Social History Narrative   • No narrative on file       Family History   Problem Relation Age of Onset   • Cancer Father    • No Known Problems Mother        Review of Systems   Constitutional: Negative.  Negative for fatigue.   HENT: Positive for congestion (head  "congestion from allergies). Negative for rhinorrhea, sneezing and sore throat.    Eyes: Negative.  Negative for visual disturbance.   Respiratory: Positive for cough (occasional productive cough ) and shortness of breath (easily SOA ; worse on exertion ). Negative for apnea, chest tightness and wheezing.         Wears o2 @ [facility]   Cardiovascular: Positive for leg swelling (BLE swelling/edema). Negative for chest pain (denies CP) and palpitations (denies palpitations).   Gastrointestinal: Negative.  Negative for abdominal distention, abdominal pain, nausea and vomiting.   Endocrine: Negative.  Negative for cold intolerance, heat intolerance, polyphagia and polyuria.   Genitourinary: Positive for frequency (frequency with Lasix use). Negative for difficulty urinating and urgency.   Musculoskeletal: Positive for arthralgias (joints). Negative for back pain, myalgias, neck pain and neck stiffness.   Skin: Negative.  Negative for rash and wound.   Allergic/Immunologic: Positive for environmental allergies (seasonal allergies).   Neurological: Negative.  Negative for dizziness, syncope, weakness, light-headedness and headaches.   Hematological: Bruises/bleeds easily (bruises and bleeds eaisly).   Psychiatric/Behavioral: Positive for agitation (easily agitated) and confusion (easily confused). Negative for sleep disturbance (denies waking up smothering/SOA). The patient is not nervous/anxious.        Objective   Vitals:    07/10/18 1108   BP: 124/47   BP Location: Left arm   Patient Position: Sitting   Pulse: 60   SpO2: 94%   Weight: 84.7 kg (186 lb 12.8 oz)   Height: 154.9 cm (61\")      /47 (BP Location: Left arm, Patient Position: Sitting)   Pulse 60   Ht 154.9 cm (61\")   Wt 84.7 kg (186 lb 12.8 oz)   SpO2 94%   BMI 35.30 kg/m²    Lab Results (most recent)     None        Physical Exam   Constitutional: She is oriented to person, place, and time. She appears well-developed and well-nourished. No distress. "   HENT:   Head: Normocephalic and atraumatic.   Eyes: Conjunctivae and EOM are normal. Pupils are equal, round, and reactive to light.   Neck: Normal range of motion. Neck supple. No JVD present. No tracheal deviation present.   Cardiovascular: Normal rate, regular rhythm, normal heart sounds and intact distal pulses.    Pulmonary/Chest: Effort normal. She has decreased breath sounds. She has no rales (Minimal bibasilar rales).   Abdominal: Soft. Bowel sounds are normal. She exhibits no distension and no mass. There is no tenderness. There is no rebound and no guarding.   Musculoskeletal: Normal range of motion. She exhibits edema. She exhibits no tenderness or deformity.   Neurological: She is alert and oriented to person, place, and time.   Skin: Skin is warm and dry. No rash noted. No erythema. No pallor.   Psychiatric: She has a normal mood and affect. Her behavior is normal. Judgment and thought content normal.   Nursing note and vitals reviewed.        Procedure   Procedures       Assessment/Plan      Diagnosis Plan   1. Shortness of breath     2. Paroxysmal atrial fibrillation (CMS/HCC)     3. Essential hypertension       The patient is stable at this time.  She is scheduled for follow-up with her pulmonology team for CT findings, as above.  Echo findings however are stable.  She is very much compensated from cardiovascular standpoint at this time.  I feel nothing further is indicated.  Should clinical course change, she will call to us.              Patient's Body mass index is 35.3 kg/m². BMI is above normal parameters. Recommendations include: educational material and referral to primary care.             Electronically signed by:

## 2018-07-10 NOTE — PATIENT INSTRUCTIONS

## 2018-09-10 ENCOUNTER — CLINICAL SUPPORT (OUTPATIENT)
Dept: CARDIOLOGY | Facility: CLINIC | Age: 78
End: 2018-09-10

## 2018-09-10 VITALS
HEART RATE: 62 BPM | DIASTOLIC BLOOD PRESSURE: 45 MMHG | OXYGEN SATURATION: 91 % | BODY MASS INDEX: 35.08 KG/M2 | HEIGHT: 61 IN | WEIGHT: 185.8 LBS | SYSTOLIC BLOOD PRESSURE: 119 MMHG

## 2018-09-10 DIAGNOSIS — R06.02 SOB (SHORTNESS OF BREATH): Primary | ICD-10-CM

## 2018-09-10 PROCEDURE — 93000 ELECTROCARDIOGRAM COMPLETE: CPT | Performed by: PHYSICIAN ASSISTANT

## 2018-09-10 NOTE — PATIENT INSTRUCTIONS

## 2018-09-10 NOTE — PROGRESS NOTES
Cari Harper  1940  9/10/2018   ?   Chief Complaint   Patient presents with   • Medical Clearance     patient was seen in office today as nurse visit with EKG      ?   HPI:   ?   ?   ?     Current Outpatient Prescriptions:   •  acetaminophen (TYLENOL) 500 MG tablet, Take 500 mg by mouth Every 6 (Six) Hours As Needed for Mild Pain ., Disp: , Rfl:   •  apixaban (ELIQUIS) 5 MG tablet tablet, Take 1 tablet by mouth Every 12 (Twelve) Hours., Disp: 60 tablet, Rfl: 5  •  bumetanide (BUMEX) 2 MG tablet, Take 1 tablet by mouth Daily., Disp: 30 tablet, Rfl: 11  •  clonazePAM (KlonoPIN) 0.5 MG tablet, Take 0.5 mg by mouth Every Morning., Disp: , Rfl:   •  clonazePAM (KlonoPIN) 1 MG tablet, Take 1 mg by mouth Every Night., Disp: , Rfl:   •  doxycycline (VIBRAMYCIN) 100 MG capsule, Take 100 mg by mouth 2 (Two) Times a Day., Disp: , Rfl:   •  ferrous sulfate 325 (65 FE) MG tablet, Take 325 mg by mouth Daily With Breakfast., Disp: , Rfl:   •  flecainide (TAMBOCOR) 50 MG tablet, Take 1 tablet by mouth Every 12 (Twelve) Hours., Disp: 60 tablet, Rfl: 11  •  FLUoxetine (PROzac) 20 MG capsule, Take 20 mg by mouth 2 (two) times a day., Disp: , Rfl:   •  loratadine (CLARITIN) 10 MG tablet, Take 10 mg by mouth Daily., Disp: , Rfl:   •  Omega-3 Fatty Acids (FISH OIL) 1200 MG capsule capsule, Take 2,400 mg by mouth Every Evening., Disp: , Rfl:   •  potassium chloride (K-DUR) 10 MEQ CR tablet, Take 1 tablet by mouth Daily., Disp: 30 tablet, Rfl: 11  •  Prenatal Vit-Fe Fumarate-FA (PRENATAL VITAMIN 27-0.8) 27-0.8 MG tablet tablet, Take 1 tablet by mouth Every Evening., Disp: , Rfl:   •  Thyroid 60 MG PO tablet, Take 60 mg by mouth Every Morning., Disp: , Rfl:    ?   ?   Patient has no known allergies.         ECG 12 Lead  Date/Time: 9/10/2018 5:19 PM  Performed by: DANIS YO  Authorized by: DANIS YO   Comments: SOB             ?   Assessment/Plan      1. Medical clearance     2. SOA    Patient appears in office today for  nurse visit with EKG per verbal order Jeovany Dotson PA-C. Patient will be seeing orthopedist soon due to (R) shoulder injury and possibly having surgery. Jeovany Dotson PA-C discussed patients current health issues/symptoms with patient and at patient had no current complaints at this time except shoulder pain and discomfort. At this time patient seems to be stable and may proceed with surgery if needed. Patient and her son were notified at this time, once they decided whom is doing surgery their office will need to fax a cardiac clearance request to the office and we will then have provider review clearance and fax it back. All orders were understood at this time and patient or son had no further questions at this time. -;LIZYA  ?   ?   ?

## 2018-10-04 ENCOUNTER — APPOINTMENT (OUTPATIENT)
Dept: GENERAL RADIOLOGY | Facility: HOSPITAL | Age: 78
End: 2018-10-04

## 2018-10-04 ENCOUNTER — HOSPITAL ENCOUNTER (EMERGENCY)
Facility: HOSPITAL | Age: 78
Discharge: HOME | End: 2018-10-04
Attending: EMERGENCY MEDICINE | Admitting: EMERGENCY MEDICINE

## 2018-10-04 VITALS
RESPIRATION RATE: 18 BRPM | DIASTOLIC BLOOD PRESSURE: 49 MMHG | HEART RATE: 60 BPM | WEIGHT: 185 LBS | TEMPERATURE: 98.1 F | SYSTOLIC BLOOD PRESSURE: 134 MMHG | BODY MASS INDEX: 34.93 KG/M2 | OXYGEN SATURATION: 95 % | HEIGHT: 61 IN

## 2018-10-04 DIAGNOSIS — M50.30 DDD (DEGENERATIVE DISC DISEASE), CERVICAL: ICD-10-CM

## 2018-10-04 DIAGNOSIS — M25.511 CHRONIC RIGHT SHOULDER PAIN: Primary | ICD-10-CM

## 2018-10-04 DIAGNOSIS — G89.29 CHRONIC RIGHT SHOULDER PAIN: Primary | ICD-10-CM

## 2018-10-04 PROCEDURE — 25010000002 MORPHINE SULFATE (PF) 2 MG/ML SOLUTION: Performed by: EMERGENCY MEDICINE

## 2018-10-04 PROCEDURE — 72050 X-RAY EXAM NECK SPINE 4/5VWS: CPT

## 2018-10-04 PROCEDURE — 99283 EMERGENCY DEPT VISIT LOW MDM: CPT

## 2018-10-04 PROCEDURE — 96372 THER/PROPH/DIAG INJ SC/IM: CPT

## 2018-10-04 PROCEDURE — 72050 X-RAY EXAM NECK SPINE 4/5VWS: CPT | Performed by: RADIOLOGY

## 2018-10-04 RX ORDER — ORPHENADRINE CITRATE 30 MG/ML
60 INJECTION INTRAMUSCULAR; INTRAVENOUS ONCE
Status: DISCONTINUED | OUTPATIENT
Start: 2018-10-04 | End: 2018-10-04

## 2018-10-04 RX ORDER — METAXALONE 800 MG/1
TABLET ORAL
Status: COMPLETED
Start: 2018-10-04 | End: 2018-10-04

## 2018-10-04 RX ORDER — MORPHINE SULFATE 2 MG/ML
4 INJECTION, SOLUTION INTRAMUSCULAR; INTRAVENOUS ONCE
Status: COMPLETED | OUTPATIENT
Start: 2018-10-04 | End: 2018-10-04

## 2018-10-04 RX ORDER — METAXALONE 800 MG/1
800 TABLET ORAL ONCE
Status: COMPLETED | OUTPATIENT
Start: 2018-10-04 | End: 2018-10-04

## 2018-10-04 RX ORDER — METAXALONE 800 MG/1
800 TABLET ORAL EVERY 6 HOURS PRN
Qty: 12 TABLET | Refills: 0 | Status: SHIPPED | OUTPATIENT
Start: 2018-10-04 | End: 2019-08-21

## 2018-10-04 RX ORDER — HYDROCODONE BITARTRATE AND ACETAMINOPHEN 7.5; 325 MG/1; MG/1
1 TABLET ORAL EVERY 6 HOURS PRN
Qty: 12 TABLET | Refills: 0 | Status: SHIPPED | OUTPATIENT
Start: 2018-10-04 | End: 2018-11-13 | Stop reason: HOSPADM

## 2018-10-04 RX ADMIN — METAXALONE 800 MG: 800 TABLET ORAL at 06:57

## 2018-10-04 RX ADMIN — MORPHINE SULFATE 4 MG: 2 INJECTION, SOLUTION INTRAMUSCULAR; INTRAVENOUS at 06:58

## 2018-10-04 NOTE — DISCHARGE INSTRUCTIONS
Home in care of son.  Take the pain medication that I wrote for you as directed, instead of your usual pain medication.  Follow up with Mary Kate Moss in the office in one to 2 days.  See your orthopedic surgeon on the 12th of this month as scheduled.  Return the emergency Department right away if symptoms worsen/any problems.

## 2018-10-04 NOTE — ED PROVIDER NOTES
Subjective   Pt comes in with chronic but worsened right shoulder andneck pain.  Pt is awaiting revision of shoulder jloint arthroplasty.  No new injury.  No relief with home pain meds.  Worse with palpation and movement        History provided by:  Patient, relative and medical records  Upper Extremity Issue   Location:  Shoulder  Shoulder location:  R shoulder  Injury: no    Pain details:     Quality:  Aching and burning    Radiates to:  R arm, R forearm and R fingers    Severity:  Severe    Onset quality:  Unable to specify    Timing:  Constant    Progression:  Worsening  Handedness:  Right-handed  Dislocation: no    Foreign body present:  Unable to specify  Tetanus status:  Unknown  Prior injury to area:  Yes  Relieved by:  Nothing  Worsened by:  Exercise and movement  Ineffective treatments:  Narcotics  Associated symptoms: no back pain, no decreased range of motion, no fatigue, no fever, no muscle weakness, no neck pain, no numbness, no stiffness, no swelling and no tingling    Risk factors: no concern for non-accidental trauma, no known bone disorder, no frequent fractures and no recent illness        Review of Systems   Constitutional: Negative.  Negative for fatigue and fever.   HENT: Negative.    Eyes: Negative.    Respiratory: Negative.    Cardiovascular: Negative.    Gastrointestinal: Negative.    Endocrine: Negative.    Genitourinary: Negative.    Musculoskeletal: Positive for arthralgias, myalgias and neck stiffness. Negative for back pain, neck pain and stiffness.   Skin: Negative.    Allergic/Immunologic: Negative.    Neurological: Negative.    Hematological: Negative.    Psychiatric/Behavioral: Negative.    All other systems reviewed and are negative.      Past Medical History:   Diagnosis Date   • Anxiety    • Arthritis    • Atrial fibrillation (CMS/HCC)    • Coronary artery disease    • Disease of thyroid gland    • DVT (deep venous thrombosis) (CMS/HCC)    • Hyperlipidemia    • Hypertension         No Known Allergies    Past Surgical History:   Procedure Laterality Date   • APPENDECTOMY     • CHOLECYSTECTOMY     • COLONOSCOPY W/ BIOPSIES     • EYE SURGERY     • TUBAL ABDOMINAL LIGATION         Family History   Problem Relation Age of Onset   • Cancer Father    • No Known Problems Mother        Social History     Social History   • Marital status: Unknown     Social History Main Topics   • Smoking status: Former Smoker     Packs/day: 1.00     Years: 0.00     Types: Cigarettes     Start date: 8/11/1961     Quit date: 8/11/1986   • Smokeless tobacco: Never Used   • Alcohol use No   • Drug use: No   • Sexual activity: Defer     Other Topics Concern   • Not on file           Objective   Physical Exam   Constitutional: She is oriented to person, place, and time. She appears well-developed and well-nourished. She appears distressed.   Appears in pain   HENT:   Head: Normocephalic and atraumatic.   Nose: Nose normal.   Moist mucus membranes   Eyes: Conjunctivae and EOM are normal. Right eye exhibits no discharge. Left eye exhibits no discharge. No scleral icterus.   Neck: No tracheal deviation present.   Pain with ROM  + Spurlings   Cardiovascular: Normal rate, regular rhythm, normal heart sounds and intact distal pulses.  Exam reveals no gallop and no friction rub.    No murmur heard.  Pulmonary/Chest: Effort normal and breath sounds normal. No stridor. No respiratory distress. She has no wheezes. She has no rales.   Abdominal: She exhibits no distension.   Musculoskeletal: She exhibits tenderness.   Palpable, tender right trapezius spasms   Neurological: She is alert and oriented to person, place, and time. No sensory deficit. She exhibits normal muscle tone. Coordination normal.   Skin: Skin is warm and dry. Capillary refill takes less than 2 seconds. No rash noted. No pallor.   Psychiatric:   anxious   Nursing note and vitals reviewed.      Procedures           ED Course  ED Course as of Oct 04 0948   Thu  Oct 04, 2018   0712 No injectable Norflex or Valium available  [TZ]      ED Course User Index  [TZ] Bebeto Vizcaino MD   I assumed patient's care from Dr. Vizcaino this morning shift change.  Please refer to his documentation above.  He was awaiting the radiologist's reading of the x-rays.  Patient denied discussed her case at length, as well as her x-ray results and her plan of care; she voices understanding and agreement.  She has surgery scheduled for a right shoulder revision.       Xr Spine Cervical Complete 4 Or 5 View    Result Date: 10/4/2018  Narrative:   XR SPINE CERVICAL COMPLETE 4 OR 5 VW-  CLINICAL INDICATION: Pain     COMPARISON: None immediately available  PROCEDURE: 5 views of the cervical spine, including obliques  FINDINGS: The vertebral body heights are adequately maintained  The disc space narrowing at C5-6 and C6-7.  There is no prevertebral soft tissue swelling  Upper lobe show interstitial lung disease  Oblique view show patency of the neural foramen.      Impression: Impression:  Arthritic change No acute bony abnormality Interstitial lung disease. .  This report was finalized on 10/4/2018 8:18 AM by Dr. Simón Stone MD.                  MDM      Final diagnoses:   Chronic right shoulder pain   DDD (degenerative disc disease), cervical             Please note that portions of this note were completed with a voice recognition program. Efforts were made to edit the dictations, but occasionally words are mistranscribed.       Pan Guido MD  10/04/18 1000      [facility] report number was [id number].         Pan Guido MD  10/04/18 1109

## 2018-10-18 NOTE — PROGRESS NOTES
Cari Harper  [id number]  1940  10/19/2018      Referring Provider:   ALYSA Arevalo    Reason for Consultation:   Microcytic anemia     Chief Complaint:  Fatigue  Dyspnea    History of Present Illness   Cari Harper is a very pleasant 78 y.o.  female who presents in new consultation at the request of ALYSA Arevalo for further management and evaluation of microcytic anemia.    Ms. Harper believe that her hemoglobin has been worsening in the last 6 months. In review of her laboratory evaluation it appears that she has had a stable anemia with a hemoglobin in the 9 range since 2016. She reports that she has been more fatigued and more dyspneic in the last several months. She has also has a planned shoulder replacement surgery for November 11th and would like to optimize her hemoglobin prior to surgery. She has never required a blood transfusion, and has been on oral ferrous sulfate 325mg once daily for the last one year and has been tolerating this without any issues. She is also on Eliquis for history of atrial fibrillation and deep venous thrombosis diagnosed two years ago. She denies of any abnormal or unusual bleeding. She believes her last colonoscopy was 2 or 3 years ago and was a routine colonoscopy and was normal as per family.      The following portions of the patient's history were reviewed and updated as appropriate: allergies, current medications, past family history, past medical history, past social history, past surgical history and problem list.    No Known Allergies      Past Medical History:   Diagnosis Date   • Anxiety    • Arthritis    • Atrial fibrillation (CMS/HCC)    • Coronary artery disease    • Disease of thyroid gland    • DVT (deep venous thrombosis) (CMS/HCC)    • Hyperlipidemia    • Hypertension    Denies of hypertension    Past Surgical History:   Procedure Laterality Date   • APPENDECTOMY     • CHOLECYSTECTOMY     • COLONOSCOPY W/ BIOPSIES      • EYE SURGERY     • TUBAL ABDOMINAL LIGATION         Social History     Social History   • Marital status: Unknown     Spouse name: N/A   • Number of children: N/A   • Years of education: N/A     Occupational History   • Not on file.     Social History Main Topics   • Smoking status: Former Smoker     Packs/day: 1.00     Years: 0.00     Types: Cigarettes     Start date: 1961     Quit date: 1986   • Smokeless tobacco: Never Used   • Alcohol use No   • Drug use: No   • Sexual activity: Defer     Other Topics Concern   • Not on file     Social History Narrative   • No narrative on file   She is , lives by herself but her children live next to her. She was a previous smoker for 20-30 years and quit 30 years ago. No alcohol or illicit drug use. She is a .         Family History   Problem Relation Age of Onset   • Cancer Father    • No Known Problems Mother    Son -  at age of 49 from Sinus cancer    Son -  from [facility]  Sister - brain tumor       Current Outpatient Prescriptions:   •  acetaminophen (TYLENOL) 500 MG tablet, Take 500 mg by mouth Every 6 (Six) Hours As Needed for Mild Pain ., Disp: , Rfl:   •  apixaban (ELIQUIS) 5 MG tablet tablet, Take 1 tablet by mouth Every 12 (Twelve) Hours., Disp: 60 tablet, Rfl: 5  •  bumetanide (BUMEX) 2 MG tablet, Take 1 tablet by mouth Daily., Disp: 30 tablet, Rfl: 11  •  clonazePAM (KlonoPIN) 0.5 MG tablet, Take 0.5 mg by mouth Every Morning., Disp: , Rfl:   •  clonazePAM (KlonoPIN) 1 MG tablet, Take 1 mg by mouth Every Night., Disp: , Rfl:   •  doxycycline (VIBRAMYCIN) 100 MG capsule, Take 100 mg by mouth 2 (Two) Times a Day., Disp: , Rfl:   •  ferrous sulfate 325 (65 FE) MG tablet, Take 325 mg by mouth Daily With Breakfast., Disp: , Rfl:   •  flecainide (TAMBOCOR) 50 MG tablet, Take 1 tablet by mouth Every 12 (Twelve) Hours., Disp: 60 tablet, Rfl: 11  •  FLUoxetine (PROzac) 20 MG capsule, Take 20 mg by mouth 2 (two) times a  day., Disp: , Rfl:   •  HYDROcodone-acetaminophen (NORCO) 7.5-325 MG per tablet, Take 1 tablet by mouth Every 6 (Six) Hours As Needed (pain)., Disp: 12 tablet, Rfl: 0  •  loratadine (CLARITIN) 10 MG tablet, Take 10 mg by mouth Daily., Disp: , Rfl:   •  metaxalone (SKELAXIN) 800 MG tablet, Take 1 tablet by mouth Every 6 (Six) Hours As Needed for Muscle Spasms., Disp: 12 tablet, Rfl: 0  •  Omega-3 Fatty Acids (FISH OIL) 1200 MG capsule capsule, Take 2,400 mg by mouth Every Evening., Disp: , Rfl:   •  potassium chloride (K-DUR) 10 MEQ CR tablet, Take 1 tablet by mouth Daily., Disp: 30 tablet, Rfl: 11  •  Prenatal Vit-Fe Fumarate-FA (PRENATAL VITAMIN 27-0.8) 27-0.8 MG tablet tablet, Take 1 tablet by mouth Every Evening., Disp: , Rfl:   •  Thyroid 60 MG PO tablet, Take 60 mg by mouth Every Morning., Disp: , Rfl:         Review of Systems  Constitutional: No fever, chills, night sweats or weight loss. Positive fatigue  HEENT:  No headaches, vision changes or hearing changes, no sinus drainage, sore throat.   Cardiovascular:  No palpitations, chest pain, syncopal episodes or edema.   Pulmonary: Positive shortness of breath, no hemoptysis, or cough.   Gastrointestinal:  No nausea or vomiting. No constipation or diarrhea. No change in appetite. No abdominal pain. No melena or hematochezia.   Genitourinary:  No hematuria, or changes in urination.   Musculoskeletal:  Positive right shoulder pain  Skin: No rashes or pruritus.   Endocrine:  No hot flashes or chills   Hematologic:  No history of free bleeding, spontaneous bleeding or clotting problems. No lymphadenopathy.    Immunologic:  No allergies or frequent infections.   Neurologic: No numbness, tingling, or weakness.   Psychiatric:  No anxiety or depression.       Physical Exam  Vital Signs: These were reviewed and listed as per patient’s electronic medical chart  Vitals:    10/19/18 1242   BP: 121/61   Pulse: 66   Resp: 18   Temp: 97.2 °F (36.2 °C)   SpO2: 98%      General: Awake, alert and oriented, in no distress, elderly  HEENT: Head is atraumatic, normocephalic, extraocular movements full, oropharynx clear, no scleral icterus, pink moist mucous membranes  Neck: supple, no jvd, lymphadenopathy or masses  Cardiovascular: regular rate and rhythm without murmurs, rubs or gallops  Pulmonary: non-labored, clear to auscultation bilaterally, no wheezing  Abdomen: soft, non-tender, non-distended, normal active bowel sounds present, no organomegaly  Extremities: No clubbing, cyanosis or edema  Lymph: No cervical, supraclavicular adenopathy  Neurologic: Mental status as above, alert, awake and oriented, grossly non-focal exam, in a wheelchair  Skin: warm, dry, intact        Labs / Studies:    Consult on 10/19/2018   Component Date Value   • Iron 10/19/2018 17*   • TIBC 10/19/2018 268    • Iron Saturation 10/19/2018 6*   • Ferritin 10/19/2018 86.00    • Reticulocyte % 10/19/2018 1.23    • Reticulocyte Absolute 10/19/2018 0.0439    • LDH 10/19/2018 193    • WBC 10/19/2018 7.11    • RBC 10/19/2018 3.57*   • Hemoglobin 10/19/2018 9.0*   • Hematocrit 10/19/2018 29.5*   • MCV 10/19/2018 82.6    • MCH 10/19/2018 25.2*   • MCHC 10/19/2018 30.5*   • RDW 10/19/2018 14.9*   • RDW-SD 10/19/2018 45.0    • MPV 10/19/2018 10.7*   • Platelets 10/19/2018 294    • Neutrophil % 10/19/2018 77.7*   • Lymphocyte % 10/19/2018 12.7*   • Monocyte % 10/19/2018 7.7    • Eosinophil % 10/19/2018 1.5    • Basophil % 10/19/2018 0.1    • Immature Grans % 10/19/2018 0.3    • Neutrophils, Absolute 10/19/2018 5.52    • Lymphocytes, Absolute 10/19/2018 0.90*   • Monocytes, Absolute 10/19/2018 0.55    • Eosinophils, Absolute 10/19/2018 0.11    • Basophils, Absolute 10/19/2018 0.01    • Immature Grans, Absolute 10/19/2018 0.02         Assessment/Plan   Cari Harper is a very pleasant 78 y.o.  female who presents in new consultation at the request of ALYSA Arevalo for further management and  evaluation of microcytic anemia.     Microcytic anemia  Iron deficiency  - We spent a great deal of time during her appointment today discussing the differential for anemia and treatment for various diagnosis.  - She continues to have an iron deficiency despite oral iron therefore I suspect she likely has malabsorption, possibly due to PPI, and therefore will start IV injectafer. Will also obtain copper, zinc, and tissue transglutaminase levels to further evaluate. (copper level obtained was elevated will repeat at next visit to see if this is a true reading)  - B12 and folate levels are pending  - LDH and retic count are normal thus less likely to be hemolysis, haptoglobin pending  - If continues to be anemic will also obtain a peripheral smear, SPEP and serum free light chains.      ACO Quality measures  - Cari Harper does not use tobacco products.  - Patient's Body mass index is 34.77 kg/m². BMI is above normal parameters. Recommendations include: referral to primary care.  - Current outpatient and discharge medications have been reconciled for the patient.  Reviewed by: Inés Hall MD      I will have the patient return in follow up appointment to review test results in one month. She understands that should she have any questions or concerns prior to her appointment she should give us a call at any time and I would be happy to see her sooner. It was a pleasure to see this patient in clinic today, thank you for allowing me to participate in the care of this patient.        Inés Hall MD  10/19/2018  7:19 PM

## 2018-10-19 ENCOUNTER — HOSPITAL ENCOUNTER (OUTPATIENT)
Dept: CT IMAGING | Facility: HOSPITAL | Age: 78
Discharge: HOME | End: 2018-10-19
Admitting: NURSE PRACTITIONER

## 2018-10-19 ENCOUNTER — TRANSCRIBE ORDERS (OUTPATIENT)
Dept: ADMINISTRATIVE | Facility: HOSPITAL | Age: 78
End: 2018-10-19

## 2018-10-19 ENCOUNTER — CONSULT (OUTPATIENT)
Dept: ONCOLOGY | Facility: CLINIC | Age: 78
End: 2018-10-19

## 2018-10-19 VITALS
BODY MASS INDEX: 34.74 KG/M2 | RESPIRATION RATE: 18 BRPM | HEART RATE: 66 BPM | TEMPERATURE: 97.2 F | DIASTOLIC BLOOD PRESSURE: 61 MMHG | WEIGHT: 184 LBS | HEIGHT: 61 IN | OXYGEN SATURATION: 98 % | SYSTOLIC BLOOD PRESSURE: 121 MMHG

## 2018-10-19 DIAGNOSIS — D50.9 MICROCYTIC ANEMIA: Primary | ICD-10-CM

## 2018-10-19 DIAGNOSIS — R94.5 NONSPECIFIC ABNORMAL RESULTS OF LIVER FUNCTION STUDY: ICD-10-CM

## 2018-10-19 DIAGNOSIS — R94.5 NONSPECIFIC ABNORMAL RESULTS OF LIVER FUNCTION STUDY: Primary | ICD-10-CM

## 2018-10-19 DIAGNOSIS — D50.9 IRON DEFICIENCY ANEMIA, UNSPECIFIED IRON DEFICIENCY ANEMIA TYPE: ICD-10-CM

## 2018-10-19 LAB
BASOPHILS # BLD AUTO: 0.01 10*3/MM3 (ref 0–0.3)
BASOPHILS NFR BLD AUTO: 0.1 % (ref 0–2)
DEPRECATED RDW RBC AUTO: 45 FL (ref 37–54)
EOSINOPHIL # BLD AUTO: 0.11 10*3/MM3 (ref 0–0.7)
EOSINOPHIL NFR BLD AUTO: 1.5 % (ref 0–7)
ERYTHROCYTE [DISTWIDTH] IN BLOOD BY AUTOMATED COUNT: 14.9 % (ref 11.5–14.5)
FERRITIN SERPL-MCNC: 86 NG/ML (ref 10–290.3)
HCT VFR BLD AUTO: 29.5 % (ref 37–47)
HGB BLD-MCNC: 9 G/DL (ref 12–16)
IMM GRANULOCYTES # BLD: 0.02 10*3/MM3 (ref 0–0.03)
IMM GRANULOCYTES NFR BLD: 0.3 % (ref 0–0.5)
IRON 24H UR-MRATE: 17 MCG/DL (ref 49–151)
IRON SATN MFR SERPL: 6 % (ref 15–50)
LDH SERPL-CCNC: 193 U/L (ref 135–225)
LYMPHOCYTES # BLD AUTO: 0.9 10*3/MM3 (ref 1–3)
LYMPHOCYTES NFR BLD AUTO: 12.7 % (ref 16–46)
MCH RBC QN AUTO: 25.2 PG (ref 27–33)
MCHC RBC AUTO-ENTMCNC: 30.5 G/DL (ref 33–37)
MCV RBC AUTO: 82.6 FL (ref 80–94)
MONOCYTES # BLD AUTO: 0.55 10*3/MM3 (ref 0.1–0.9)
MONOCYTES NFR BLD AUTO: 7.7 % (ref 0–12)
NEUTROPHILS # BLD AUTO: 5.52 10*3/MM3 (ref 1.4–6.5)
NEUTROPHILS NFR BLD AUTO: 77.7 % (ref 40–75)
PLATELET # BLD AUTO: 294 10*3/MM3 (ref 130–400)
PMV BLD AUTO: 10.7 FL (ref 6–10)
RBC # BLD AUTO: 3.57 10*6/MM3 (ref 4.2–5.4)
RETICS #: 0.04 10*6/MM3 (ref 0.02–0.13)
RETICS/RBC NFR AUTO: 1.23 % (ref 0.5–2)
TIBC SERPL-MCNC: 268 MCG/DL (ref 241–421)
WBC NRBC COR # BLD: 7.11 10*3/MM3 (ref 4.5–12.5)

## 2018-10-19 PROCEDURE — 74150 CT ABDOMEN W/O CONTRAST: CPT

## 2018-10-19 PROCEDURE — 85025 COMPLETE CBC W/AUTO DIFF WBC: CPT | Performed by: INTERNAL MEDICINE

## 2018-10-19 PROCEDURE — 82525 ASSAY OF COPPER: CPT | Performed by: INTERNAL MEDICINE

## 2018-10-19 PROCEDURE — 83010 ASSAY OF HAPTOGLOBIN QUANT: CPT | Performed by: INTERNAL MEDICINE

## 2018-10-19 PROCEDURE — 83516 IMMUNOASSAY NONANTIBODY: CPT | Performed by: INTERNAL MEDICINE

## 2018-10-19 PROCEDURE — 74150 CT ABDOMEN W/O CONTRAST: CPT | Performed by: RADIOLOGY

## 2018-10-19 PROCEDURE — 83550 IRON BINDING TEST: CPT | Performed by: INTERNAL MEDICINE

## 2018-10-19 PROCEDURE — 84630 ASSAY OF ZINC: CPT | Performed by: INTERNAL MEDICINE

## 2018-10-19 PROCEDURE — 82728 ASSAY OF FERRITIN: CPT | Performed by: INTERNAL MEDICINE

## 2018-10-19 PROCEDURE — 99204 OFFICE O/P NEW MOD 45 MIN: CPT | Performed by: INTERNAL MEDICINE

## 2018-10-19 PROCEDURE — 85045 AUTOMATED RETICULOCYTE COUNT: CPT | Performed by: INTERNAL MEDICINE

## 2018-10-19 PROCEDURE — 83615 LACTATE (LD) (LDH) ENZYME: CPT | Performed by: INTERNAL MEDICINE

## 2018-10-19 PROCEDURE — 83540 ASSAY OF IRON: CPT | Performed by: INTERNAL MEDICINE

## 2018-10-19 RX ORDER — SODIUM CHLORIDE 9 MG/ML
250 INJECTION, SOLUTION INTRAVENOUS ONCE
Status: CANCELLED | OUTPATIENT
Start: 2018-10-30 | End: 2018-10-30

## 2018-10-19 RX ORDER — SODIUM CHLORIDE 9 MG/ML
250 INJECTION, SOLUTION INTRAVENOUS ONCE
Status: CANCELLED | OUTPATIENT
Start: 2018-10-23 | End: 2018-10-23

## 2018-10-20 LAB
HAPTOGLOB SERPL-MCNC: 298 MG/DL (ref 34–200)
TTG IGA SER-ACNC: <2 U/ML (ref 0–3)

## 2018-10-23 ENCOUNTER — INFUSION (OUTPATIENT)
Dept: ONCOLOGY | Facility: HOSPITAL | Age: 78
End: 2018-10-23

## 2018-10-23 VITALS
SYSTOLIC BLOOD PRESSURE: 140 MMHG | WEIGHT: 184.2 LBS | BODY MASS INDEX: 34.8 KG/M2 | HEART RATE: 63 BPM | DIASTOLIC BLOOD PRESSURE: 66 MMHG | OXYGEN SATURATION: 95 % | RESPIRATION RATE: 18 BRPM | TEMPERATURE: 97.3 F

## 2018-10-23 DIAGNOSIS — D50.9 IRON DEFICIENCY ANEMIA, UNSPECIFIED IRON DEFICIENCY ANEMIA TYPE: Primary | ICD-10-CM

## 2018-10-23 LAB
COPPER SERPL-MCNC: 188 UG/DL (ref 72–166)
ZINC SERPL-MCNC: 56 UG/DL (ref 56–134)

## 2018-10-23 PROCEDURE — 96374 THER/PROPH/DIAG INJ IV PUSH: CPT

## 2018-10-23 PROCEDURE — 25010000002 FERRIC CARBOXYMALTOSE 750 MG/15ML SOLUTION 15 ML VIAL: Performed by: INTERNAL MEDICINE

## 2018-10-23 RX ORDER — SODIUM CHLORIDE 9 MG/ML
250 INJECTION, SOLUTION INTRAVENOUS ONCE
Status: COMPLETED | OUTPATIENT
Start: 2018-10-23 | End: 2018-10-23

## 2018-10-23 RX ADMIN — SODIUM CHLORIDE 250 ML: 9 INJECTION, SOLUTION INTRAVENOUS at 11:40

## 2018-10-23 RX ADMIN — FERRIC CARBOXYMALTOSE INJECTION 750 MG: 50 INJECTION, SOLUTION INTRAVENOUS at 11:40

## 2018-10-29 DIAGNOSIS — R78.79 HIGH BLOOD COPPER LEVEL: Primary | ICD-10-CM

## 2018-10-29 DIAGNOSIS — D50.9 IRON DEFICIENCY ANEMIA, UNSPECIFIED IRON DEFICIENCY ANEMIA TYPE: ICD-10-CM

## 2018-10-30 ENCOUNTER — LAB (OUTPATIENT)
Dept: ONCOLOGY | Facility: CLINIC | Age: 78
End: 2018-10-30

## 2018-10-30 ENCOUNTER — INFUSION (OUTPATIENT)
Dept: ONCOLOGY | Facility: HOSPITAL | Age: 78
End: 2018-10-30

## 2018-10-30 VITALS
HEART RATE: 61 BPM | BODY MASS INDEX: 34.58 KG/M2 | WEIGHT: 183 LBS | OXYGEN SATURATION: 92 % | RESPIRATION RATE: 18 BRPM | SYSTOLIC BLOOD PRESSURE: 112 MMHG | TEMPERATURE: 97.3 F | DIASTOLIC BLOOD PRESSURE: 67 MMHG

## 2018-10-30 DIAGNOSIS — D50.9 IRON DEFICIENCY ANEMIA, UNSPECIFIED IRON DEFICIENCY ANEMIA TYPE: Primary | ICD-10-CM

## 2018-10-30 DIAGNOSIS — R78.79 HIGH BLOOD COPPER LEVEL: ICD-10-CM

## 2018-10-30 PROCEDURE — 82525 ASSAY OF COPPER: CPT | Performed by: INTERNAL MEDICINE

## 2018-10-30 PROCEDURE — 96374 THER/PROPH/DIAG INJ IV PUSH: CPT | Performed by: NURSE PRACTITIONER

## 2018-10-30 PROCEDURE — 25010000002 FERRIC CARBOXYMALTOSE 750 MG/15ML SOLUTION 15 ML VIAL: Performed by: INTERNAL MEDICINE

## 2018-10-30 PROCEDURE — 96365 THER/PROPH/DIAG IV INF INIT: CPT | Performed by: NURSE PRACTITIONER

## 2018-10-30 RX ORDER — SODIUM CHLORIDE 9 MG/ML
250 INJECTION, SOLUTION INTRAVENOUS ONCE
Status: COMPLETED | OUTPATIENT
Start: 2018-10-30 | End: 2018-10-30

## 2018-10-30 RX ADMIN — SODIUM CHLORIDE 250 ML: 9 INJECTION, SOLUTION INTRAVENOUS at 14:25

## 2018-10-30 RX ADMIN — FERRIC CARBOXYMALTOSE INJECTION 750 MG: 50 INJECTION, SOLUTION INTRAVENOUS at 14:25

## 2018-11-02 DIAGNOSIS — R79.0 ABNORMAL BLOOD LEVEL OF COPPER: Primary | ICD-10-CM

## 2018-11-02 LAB — COPPER SERPL-MCNC: 178 UG/DL (ref 72–166)

## 2018-11-02 NOTE — PROGRESS NOTES
Patient was called with the results of her copper studies and she wanted me to discuss with her son. Her serum copper level continues to be elevated (although decreased from her last visit). She is currently on a prenatal vitamin and I have asked her to stop this and will redraw at next lab visit. If this continues to remain elevated despite stopping multivitamin will also obtain serum ceruloplasmin level and complete metabolic panel.

## 2018-11-06 DIAGNOSIS — E61.1 IRON DEFICIENCY: Primary | ICD-10-CM

## 2018-11-08 ENCOUNTER — TELEPHONE (OUTPATIENT)
Dept: CARDIOLOGY | Facility: CLINIC | Age: 78
End: 2018-11-08

## 2018-11-09 NOTE — TELEPHONE ENCOUNTER
This cardiac clearance req was received and reviewed by Jeovany Dotson PA-C. Faxed back on 11/08/2018. -;LIZYA

## 2018-11-09 NOTE — TELEPHONE ENCOUNTER
----- Message from Hannah Mendoza sent at 11/8/2018 12:23 PM EST -----  Regarding: CARDIAC CLEARANCE  Contact: [phone]  JASON PARKER CALLED AND SAID THE PATIENT IS HAVING SURGERY Monday 11/12/18 AT [facility] AND THEY ARE NEEDING HER CARDIAC CLEARANCE BY 11/09/18. REQUESTING HER CLEARANCE BE  FAXED TO   [phone] (FAX NUMBER).

## 2018-11-11 ENCOUNTER — APPOINTMENT (OUTPATIENT)
Dept: PREADMISSION TESTING | Facility: HOSPITAL | Age: 78
End: 2018-11-11

## 2018-11-11 VITALS — HEIGHT: 61 IN | WEIGHT: 180.78 LBS | BODY MASS INDEX: 34.13 KG/M2

## 2018-11-11 LAB
ABO GROUP BLD: NORMAL
BLD GP AB SCN SERPL QL: NEGATIVE
DEPRECATED RDW RBC AUTO: 54.5 FL (ref 37–54)
ERYTHROCYTE [DISTWIDTH] IN BLOOD BY AUTOMATED COUNT: 17.4 % (ref 11.3–14.5)
HBA1C MFR BLD: 5.2 % (ref 4.8–5.6)
HCT VFR BLD AUTO: 34.6 % (ref 34.5–44)
HGB BLD-MCNC: 10.8 G/DL (ref 11.5–15.5)
MCH RBC QN AUTO: 26.4 PG (ref 27–31)
MCHC RBC AUTO-ENTMCNC: 31.2 G/DL (ref 32–36)
MCV RBC AUTO: 84.6 FL (ref 80–99)
PLATELET # BLD AUTO: 254 10*3/MM3 (ref 150–450)
PMV BLD AUTO: 10.8 FL (ref 6–12)
POTASSIUM BLD-SCNC: 3.6 MMOL/L (ref 3.5–5.5)
RBC # BLD AUTO: 4.09 10*6/MM3 (ref 3.89–5.14)
RH BLD: NEGATIVE
T&S EXPIRATION DATE: NORMAL
WBC NRBC COR # BLD: 6.73 10*3/MM3 (ref 3.5–10.8)

## 2018-11-11 PROCEDURE — 85027 COMPLETE CBC AUTOMATED: CPT | Performed by: ANESTHESIOLOGY

## 2018-11-11 PROCEDURE — 86900 BLOOD TYPING SEROLOGIC ABO: CPT | Performed by: ORTHOPAEDIC SURGERY

## 2018-11-11 PROCEDURE — 86901 BLOOD TYPING SEROLOGIC RH(D): CPT | Performed by: ORTHOPAEDIC SURGERY

## 2018-11-11 PROCEDURE — 84132 ASSAY OF SERUM POTASSIUM: CPT | Performed by: ANESTHESIOLOGY

## 2018-11-11 PROCEDURE — 86850 RBC ANTIBODY SCREEN: CPT | Performed by: ORTHOPAEDIC SURGERY

## 2018-11-11 PROCEDURE — 36415 COLL VENOUS BLD VENIPUNCTURE: CPT

## 2018-11-11 PROCEDURE — 83036 HEMOGLOBIN GLYCOSYLATED A1C: CPT | Performed by: ORTHOPAEDIC SURGERY

## 2018-11-11 RX ORDER — OMEPRAZOLE 20 MG/1
20 CAPSULE, DELAYED RELEASE ORAL 2 TIMES DAILY
COMMUNITY

## 2018-11-11 RX ORDER — BUDESONIDE AND FORMOTEROL FUMARATE DIHYDRATE 160; 4.5 UG/1; UG/1
2 AEROSOL RESPIRATORY (INHALATION)
COMMUNITY

## 2018-11-11 NOTE — DISCHARGE INSTRUCTIONS

## 2018-11-11 NOTE — PAT
Patient to apply Chlorhexadine wipes  to surgical area (as instructed) the night before procedure and the AM of procedure. Wipes provided.    Blood bank bracelet applied to patient during Pre Admission Testing visit.  Patient instructed not remove from arm until after procedure and they are discharged from the hospital.  Explained to patient that they may shower and get the bracelet wet, but not to immerse under water for longer periods (bathing, swimming, hand dishwashing, etc).  Patient verbalized understanding.    Patient instructed to drink 20 ounces (or until full) of Gatorade or 20 ounces of G2 (if diabetic) and complete 3 hours before your surgery start time. (NO RED Gatorade or G2)    Patient verbalized understanding.    Patient denies any dysuria, flank pain or urinary frequency, urinalysis not obtained.     EKG and cardiac clearance on chart.

## 2018-11-12 ENCOUNTER — HOSPITAL ENCOUNTER (INPATIENT)
Facility: HOSPITAL | Age: 78
LOS: 1 days | Discharge: HOME HEALTH | End: 2018-11-13
Attending: ORTHOPAEDIC SURGERY | Admitting: ORTHOPAEDIC SURGERY

## 2018-11-12 ENCOUNTER — ANESTHESIA EVENT (OUTPATIENT)
Dept: PERIOP | Facility: HOSPITAL | Age: 78
End: 2018-11-12

## 2018-11-12 ENCOUNTER — APPOINTMENT (OUTPATIENT)
Dept: GENERAL RADIOLOGY | Facility: HOSPITAL | Age: 78
End: 2018-11-12

## 2018-11-12 ENCOUNTER — ANESTHESIA (OUTPATIENT)
Dept: PERIOP | Facility: HOSPITAL | Age: 78
End: 2018-11-12

## 2018-11-12 DIAGNOSIS — Z78.9 IMPAIRED MOBILITY AND ADLS: Primary | ICD-10-CM

## 2018-11-12 DIAGNOSIS — Z74.09 IMPAIRED MOBILITY AND ADLS: Primary | ICD-10-CM

## 2018-11-12 DIAGNOSIS — Z74.09 IMPAIRED FUNCTIONAL MOBILITY, BALANCE, GAIT, AND ENDURANCE: ICD-10-CM

## 2018-11-12 PROBLEM — M25.511 RIGHT SHOULDER PAIN: Status: ACTIVE | Noted: 2018-11-12

## 2018-11-12 PROBLEM — I48.91 ATRIAL FIBRILLATION (HCC): Status: ACTIVE | Noted: 2018-11-12

## 2018-11-12 PROBLEM — I50.9 CHF (CONGESTIVE HEART FAILURE) (HCC): Status: ACTIVE | Noted: 2018-11-12

## 2018-11-12 PROBLEM — M19.019 SHOULDER ARTHRITIS: Status: ACTIVE | Noted: 2018-11-12

## 2018-11-12 PROBLEM — E78.5 HLD (HYPERLIPIDEMIA): Status: ACTIVE | Noted: 2018-11-12

## 2018-11-12 PROBLEM — I10 HTN (HYPERTENSION): Status: ACTIVE | Noted: 2018-11-12

## 2018-11-12 LAB
ABO GROUP BLD: NORMAL
RH BLD: NEGATIVE

## 2018-11-12 PROCEDURE — C1776 JOINT DEVICE (IMPLANTABLE): HCPCS | Performed by: ORTHOPAEDIC SURGERY

## 2018-11-12 PROCEDURE — 25010000002 FENTANYL CITRATE (PF) 100 MCG/2ML SOLUTION: Performed by: NURSE ANESTHETIST, CERTIFIED REGISTERED

## 2018-11-12 PROCEDURE — 25010000003 CEFAZOLIN IN DEXTROSE 2-4 GM/100ML-% SOLUTION: Performed by: ORTHOPAEDIC SURGERY

## 2018-11-12 PROCEDURE — 25010000002 DEXAMETHASONE PER 1 MG: Performed by: NURSE ANESTHETIST, CERTIFIED REGISTERED

## 2018-11-12 PROCEDURE — 63710000001 POTASSIUM CHLORIDE 10 MEQ CAPSULE CONTROLLED-RELEASE: Performed by: NURSE PRACTITIONER

## 2018-11-12 PROCEDURE — A9270 NON-COVERED ITEM OR SERVICE: HCPCS | Performed by: NURSE PRACTITIONER

## 2018-11-12 PROCEDURE — 63710000001 BUDESONIDE-FORMOTEROL 160-4.5 MCG/ACT AEROSOL 6 G INHALER: Performed by: NURSE PRACTITIONER

## 2018-11-12 PROCEDURE — C1713 ANCHOR/SCREW BN/BN,TIS/BN: HCPCS | Performed by: ORTHOPAEDIC SURGERY

## 2018-11-12 PROCEDURE — 94799 UNLISTED PULMONARY SVC/PX: CPT

## 2018-11-12 PROCEDURE — 25010000002 ONDANSETRON PER 1 MG: Performed by: NURSE ANESTHETIST, CERTIFIED REGISTERED

## 2018-11-12 PROCEDURE — 0RRJ00Z REPLACEMENT OF RIGHT SHOULDER JOINT WITH REVERSE BALL AND SOCKET SYNTHETIC SUBSTITUTE, OPEN APPROACH: ICD-10-PCS | Performed by: ORTHOPAEDIC SURGERY

## 2018-11-12 PROCEDURE — 25010000002 PROPOFOL 10 MG/ML EMULSION: Performed by: NURSE ANESTHETIST, CERTIFIED REGISTERED

## 2018-11-12 PROCEDURE — 63710000001 FLECAINIDE 50 MG TABLET: Performed by: NURSE PRACTITIONER

## 2018-11-12 PROCEDURE — 63710000001 BUMETANIDE 2 MG TABLET: Performed by: NURSE PRACTITIONER

## 2018-11-12 PROCEDURE — 25010000002 NEOSTIGMINE 10 MG/10ML SOLUTION: Performed by: NURSE ANESTHETIST, CERTIFIED REGISTERED

## 2018-11-12 PROCEDURE — 73020 X-RAY EXAM OF SHOULDER: CPT

## 2018-11-12 PROCEDURE — 25010000002 TOBRAMYCIN PER 80 MG: Performed by: ORTHOPAEDIC SURGERY

## 2018-11-12 PROCEDURE — 86901 BLOOD TYPING SEROLOGIC RH(D): CPT

## 2018-11-12 PROCEDURE — 25010000002 HYDROMORPHONE PER 4 MG: Performed by: NURSE ANESTHETIST, CERTIFIED REGISTERED

## 2018-11-12 PROCEDURE — 86900 BLOOD TYPING SEROLOGIC ABO: CPT

## 2018-11-12 DEVICE — SCRW CENTRL GLEN UNIVERS REVERS 25MM: Type: IMPLANTABLE DEVICE | Site: SHOULDER | Status: FUNCTIONAL

## 2018-11-12 DEVICE — GLENOSPHERE UNIVERS REVERS MODULAR L/24 36PLS4: Type: IMPLANTABLE DEVICE | Site: SHOULDER | Status: FUNCTIONAL

## 2018-11-12 DEVICE — CUP SUT UNIVERS REVERS OFFST PLS2 36 RT: Type: IMPLANTABLE DEVICE | Site: SHOULDER | Status: FUNCTIONAL

## 2018-11-12 DEVICE — IMPLANTABLE DEVICE: Type: IMPLANTABLE DEVICE | Site: SHOULDER | Status: FUNCTIONAL

## 2018-11-12 DEVICE — SCRW LK GLEN UNIVERS REVERS PERIPH 5.5X40MM: Type: IMPLANTABLE DEVICE | Site: SHOULDER | Status: FUNCTIONAL

## 2018-11-12 DEVICE — STEM HUM/SHLDR UNIVERS REVERS SZ6: Type: IMPLANTABLE DEVICE | Site: SHOULDER | Status: FUNCTIONAL

## 2018-11-12 DEVICE — SCRW LK GLEN UNIVERS REVERS PERIPH 5.5X24MM: Type: IMPLANTABLE DEVICE | Site: SHOULDER | Status: FUNCTIONAL

## 2018-11-12 DEVICE — BASEPLT GLEN UNIVERS REVERS MODULAR 24MM: Type: IMPLANTABLE DEVICE | Site: SHOULDER | Status: FUNCTIONAL

## 2018-11-12 RX ORDER — SODIUM CHLORIDE 0.9 % (FLUSH) 0.9 %
3-10 SYRINGE (ML) INJECTION AS NEEDED
Status: DISCONTINUED | OUTPATIENT
Start: 2018-11-12 | End: 2018-11-12 | Stop reason: HOSPADM

## 2018-11-12 RX ORDER — FENTANYL CITRATE 50 UG/ML
50 INJECTION, SOLUTION INTRAMUSCULAR; INTRAVENOUS
Status: DISCONTINUED | OUTPATIENT
Start: 2018-11-12 | End: 2018-11-12 | Stop reason: HOSPADM

## 2018-11-12 RX ORDER — BUMETANIDE 2 MG/1
2 TABLET ORAL DAILY
Status: DISCONTINUED | OUTPATIENT
Start: 2018-11-12 | End: 2018-11-13 | Stop reason: HOSPADM

## 2018-11-12 RX ORDER — DEXAMETHASONE SODIUM PHOSPHATE 4 MG/ML
INJECTION, SOLUTION INTRA-ARTICULAR; INTRALESIONAL; INTRAMUSCULAR; INTRAVENOUS; SOFT TISSUE AS NEEDED
Status: DISCONTINUED | OUTPATIENT
Start: 2018-11-12 | End: 2018-11-12 | Stop reason: SURG

## 2018-11-12 RX ORDER — GLYCOPYRROLATE 0.2 MG/ML
INJECTION INTRAMUSCULAR; INTRAVENOUS AS NEEDED
Status: DISCONTINUED | OUTPATIENT
Start: 2018-11-12 | End: 2018-11-12 | Stop reason: SURG

## 2018-11-12 RX ORDER — HYDROMORPHONE HYDROCHLORIDE 1 MG/ML
0.5 INJECTION, SOLUTION INTRAMUSCULAR; INTRAVENOUS; SUBCUTANEOUS
Status: DISCONTINUED | OUTPATIENT
Start: 2018-11-12 | End: 2018-11-12 | Stop reason: HOSPADM

## 2018-11-12 RX ORDER — TOBRAMYCIN SULFATE 40 MG/ML
VIAL (ML) INJECTION AS NEEDED
Status: DISCONTINUED | OUTPATIENT
Start: 2018-11-12 | End: 2018-11-12 | Stop reason: HOSPADM

## 2018-11-12 RX ORDER — FLECAINIDE ACETATE 50 MG/1
50 TABLET ORAL EVERY 12 HOURS SCHEDULED
Status: DISCONTINUED | OUTPATIENT
Start: 2018-11-12 | End: 2018-11-13 | Stop reason: HOSPADM

## 2018-11-12 RX ORDER — ONDANSETRON 4 MG/1
4 TABLET, FILM COATED ORAL EVERY 6 HOURS PRN
Status: DISCONTINUED | OUTPATIENT
Start: 2018-11-12 | End: 2018-11-13 | Stop reason: HOSPADM

## 2018-11-12 RX ORDER — ATRACURIUM BESYLATE 10 MG/ML
INJECTION, SOLUTION INTRAVENOUS AS NEEDED
Status: DISCONTINUED | OUTPATIENT
Start: 2018-11-12 | End: 2018-11-12 | Stop reason: SURG

## 2018-11-12 RX ORDER — OXYCODONE AND ACETAMINOPHEN 7.5; 325 MG/1; MG/1
1 TABLET ORAL EVERY 4 HOURS PRN
Status: DISCONTINUED | OUTPATIENT
Start: 2018-11-12 | End: 2018-11-13 | Stop reason: HOSPADM

## 2018-11-12 RX ORDER — ACETAMINOPHEN 500 MG
1000 TABLET ORAL ONCE
Status: COMPLETED | OUTPATIENT
Start: 2018-11-12 | End: 2018-11-12

## 2018-11-12 RX ORDER — BUDESONIDE AND FORMOTEROL FUMARATE DIHYDRATE 160; 4.5 UG/1; UG/1
2 AEROSOL RESPIRATORY (INHALATION)
Status: DISCONTINUED | OUTPATIENT
Start: 2018-11-12 | End: 2018-11-13 | Stop reason: HOSPADM

## 2018-11-12 RX ORDER — PREGABALIN 75 MG/1
75 CAPSULE ORAL ONCE
Status: COMPLETED | OUTPATIENT
Start: 2018-11-12 | End: 2018-11-12

## 2018-11-12 RX ORDER — SODIUM CHLORIDE 0.9 % (FLUSH) 0.9 %
3 SYRINGE (ML) INJECTION EVERY 12 HOURS SCHEDULED
Status: DISCONTINUED | OUTPATIENT
Start: 2018-11-12 | End: 2018-11-12 | Stop reason: HOSPADM

## 2018-11-12 RX ORDER — NALOXONE HCL 0.4 MG/ML
0.1 VIAL (ML) INJECTION
Status: DISCONTINUED | OUTPATIENT
Start: 2018-11-12 | End: 2018-11-13 | Stop reason: HOSPADM

## 2018-11-12 RX ORDER — ONDANSETRON 2 MG/ML
INJECTION INTRAMUSCULAR; INTRAVENOUS AS NEEDED
Status: DISCONTINUED | OUTPATIENT
Start: 2018-11-12 | End: 2018-11-12 | Stop reason: SURG

## 2018-11-12 RX ORDER — METAXALONE 800 MG/1
800 TABLET ORAL EVERY 6 HOURS PRN
Status: DISCONTINUED | OUTPATIENT
Start: 2018-11-12 | End: 2018-11-13 | Stop reason: HOSPADM

## 2018-11-12 RX ORDER — SODIUM CHLORIDE, SODIUM LACTATE, POTASSIUM CHLORIDE, CALCIUM CHLORIDE 600; 310; 30; 20 MG/100ML; MG/100ML; MG/100ML; MG/100ML
9 INJECTION, SOLUTION INTRAVENOUS CONTINUOUS
Status: DISCONTINUED | OUTPATIENT
Start: 2018-11-12 | End: 2018-11-13 | Stop reason: HOSPADM

## 2018-11-12 RX ORDER — CEFAZOLIN SODIUM 2 G/100ML
2 INJECTION, SOLUTION INTRAVENOUS ONCE
Status: COMPLETED | OUTPATIENT
Start: 2018-11-12 | End: 2018-11-12

## 2018-11-12 RX ORDER — FAMOTIDINE 20 MG/1
20 TABLET, FILM COATED ORAL ONCE
Status: COMPLETED | OUTPATIENT
Start: 2018-11-12 | End: 2018-11-12

## 2018-11-12 RX ORDER — LIDOCAINE HYDROCHLORIDE 10 MG/ML
0.5 INJECTION, SOLUTION EPIDURAL; INFILTRATION; INTRACAUDAL; PERINEURAL ONCE AS NEEDED
Status: COMPLETED | OUTPATIENT
Start: 2018-11-12 | End: 2018-11-12

## 2018-11-12 RX ORDER — CETIRIZINE HYDROCHLORIDE 10 MG/1
10 TABLET ORAL DAILY
Status: DISCONTINUED | OUTPATIENT
Start: 2018-11-12 | End: 2018-11-13 | Stop reason: HOSPADM

## 2018-11-12 RX ORDER — POTASSIUM CHLORIDE 750 MG/1
10 CAPSULE, EXTENDED RELEASE ORAL DAILY
Status: DISCONTINUED | OUTPATIENT
Start: 2018-11-12 | End: 2018-11-13 | Stop reason: HOSPADM

## 2018-11-12 RX ORDER — FLUOXETINE HYDROCHLORIDE 20 MG/1
20 CAPSULE ORAL EVERY 12 HOURS SCHEDULED
Status: DISCONTINUED | OUTPATIENT
Start: 2018-11-12 | End: 2018-11-13 | Stop reason: HOSPADM

## 2018-11-12 RX ORDER — LABETALOL HYDROCHLORIDE 5 MG/ML
5 INJECTION, SOLUTION INTRAVENOUS
Status: DISCONTINUED | OUTPATIENT
Start: 2018-11-12 | End: 2018-11-12 | Stop reason: HOSPADM

## 2018-11-12 RX ORDER — ONDANSETRON 2 MG/ML
4 INJECTION INTRAMUSCULAR; INTRAVENOUS ONCE AS NEEDED
Status: DISCONTINUED | OUTPATIENT
Start: 2018-11-12 | End: 2018-11-12 | Stop reason: HOSPADM

## 2018-11-12 RX ORDER — CELECOXIB 200 MG/1
200 CAPSULE ORAL ONCE
Status: COMPLETED | OUTPATIENT
Start: 2018-11-12 | End: 2018-11-12

## 2018-11-12 RX ORDER — SODIUM CHLORIDE 9 MG/ML
INJECTION, SOLUTION INTRAVENOUS AS NEEDED
Status: DISCONTINUED | OUTPATIENT
Start: 2018-11-12 | End: 2018-11-12 | Stop reason: HOSPADM

## 2018-11-12 RX ORDER — BUPIVACAINE HYDROCHLORIDE 2.5 MG/ML
INJECTION, SOLUTION EPIDURAL; INFILTRATION; INTRACAUDAL AS NEEDED
Status: DISCONTINUED | OUTPATIENT
Start: 2018-11-12 | End: 2018-11-12 | Stop reason: HOSPADM

## 2018-11-12 RX ORDER — OXYCODONE AND ACETAMINOPHEN 7.5; 325 MG/1; MG/1
2 TABLET ORAL EVERY 4 HOURS PRN
Status: DISCONTINUED | OUTPATIENT
Start: 2018-11-12 | End: 2018-11-13 | Stop reason: HOSPADM

## 2018-11-12 RX ORDER — HYDROMORPHONE HYDROCHLORIDE 1 MG/ML
0.5 INJECTION, SOLUTION INTRAMUSCULAR; INTRAVENOUS; SUBCUTANEOUS
Status: DISCONTINUED | OUTPATIENT
Start: 2018-11-12 | End: 2018-11-13 | Stop reason: HOSPADM

## 2018-11-12 RX ORDER — LIDOCAINE HYDROCHLORIDE 10 MG/ML
INJECTION, SOLUTION EPIDURAL; INFILTRATION; INTRACAUDAL; PERINEURAL AS NEEDED
Status: DISCONTINUED | OUTPATIENT
Start: 2018-11-12 | End: 2018-11-12 | Stop reason: SURG

## 2018-11-12 RX ORDER — FAMOTIDINE 10 MG/ML
20 INJECTION, SOLUTION INTRAVENOUS ONCE
Status: CANCELLED | OUTPATIENT
Start: 2018-11-12 | End: 2018-11-12

## 2018-11-12 RX ORDER — DOCUSATE SODIUM 100 MG/1
100 CAPSULE, LIQUID FILLED ORAL 2 TIMES DAILY PRN
Status: DISCONTINUED | OUTPATIENT
Start: 2018-11-12 | End: 2018-11-13 | Stop reason: HOSPADM

## 2018-11-12 RX ORDER — CLONAZEPAM 1 MG/1
1 TABLET ORAL 2 TIMES DAILY PRN
Status: DISCONTINUED | OUTPATIENT
Start: 2018-11-12 | End: 2018-11-13 | Stop reason: HOSPADM

## 2018-11-12 RX ORDER — ASPIRIN 325 MG
325 TABLET, DELAYED RELEASE (ENTERIC COATED) ORAL DAILY
Status: DISCONTINUED | OUTPATIENT
Start: 2018-11-13 | End: 2018-11-13 | Stop reason: HOSPADM

## 2018-11-12 RX ORDER — CLONAZEPAM 0.5 MG/1
0.5 TABLET ORAL DAILY PRN
Status: DISCONTINUED | OUTPATIENT
Start: 2018-11-12 | End: 2018-11-13 | Stop reason: HOSPADM

## 2018-11-12 RX ORDER — PANTOPRAZOLE SODIUM 40 MG/1
40 TABLET, DELAYED RELEASE ORAL EVERY MORNING
Status: DISCONTINUED | OUTPATIENT
Start: 2018-11-13 | End: 2018-11-13 | Stop reason: HOSPADM

## 2018-11-12 RX ORDER — CEFAZOLIN SODIUM 2 G/100ML
2 INJECTION, SOLUTION INTRAVENOUS EVERY 8 HOURS
Status: COMPLETED | OUTPATIENT
Start: 2018-11-12 | End: 2018-11-13

## 2018-11-12 RX ORDER — CLONAZEPAM 1 MG/1
1 TABLET ORAL NIGHTLY
Status: DISCONTINUED | OUTPATIENT
Start: 2018-11-12 | End: 2018-11-13 | Stop reason: HOSPADM

## 2018-11-12 RX ORDER — ONDANSETRON 2 MG/ML
4 INJECTION INTRAMUSCULAR; INTRAVENOUS EVERY 6 HOURS PRN
Status: DISCONTINUED | OUTPATIENT
Start: 2018-11-12 | End: 2018-11-13 | Stop reason: HOSPADM

## 2018-11-12 RX ORDER — LEVOTHYROXINE AND LIOTHYRONINE 19; 4.5 UG/1; UG/1
60 TABLET ORAL EVERY MORNING
Status: DISCONTINUED | OUTPATIENT
Start: 2018-11-13 | End: 2018-11-13 | Stop reason: HOSPADM

## 2018-11-12 RX ORDER — NEOSTIGMINE METHYLSULFATE 1 MG/ML
INJECTION, SOLUTION INTRAVENOUS AS NEEDED
Status: DISCONTINUED | OUTPATIENT
Start: 2018-11-12 | End: 2018-11-12 | Stop reason: SURG

## 2018-11-12 RX ORDER — FENTANYL CITRATE 50 UG/ML
INJECTION, SOLUTION INTRAMUSCULAR; INTRAVENOUS AS NEEDED
Status: DISCONTINUED | OUTPATIENT
Start: 2018-11-12 | End: 2018-11-12 | Stop reason: SURG

## 2018-11-12 RX ORDER — PROPOFOL 10 MG/ML
VIAL (ML) INTRAVENOUS AS NEEDED
Status: DISCONTINUED | OUTPATIENT
Start: 2018-11-12 | End: 2018-11-12 | Stop reason: SURG

## 2018-11-12 RX ADMIN — FAMOTIDINE 20 MG: 20 TABLET ORAL at 10:36

## 2018-11-12 RX ADMIN — FLECAINIDE ACETATE 50 MG: 50 TABLET ORAL at 21:30

## 2018-11-12 RX ADMIN — DEXAMETHASONE SODIUM PHOSPHATE 8 MG: 4 INJECTION, SOLUTION INTRAMUSCULAR; INTRAVENOUS at 14:15

## 2018-11-12 RX ADMIN — MUPIROCIN 10 APPLICATION: 20 OINTMENT TOPICAL at 10:36

## 2018-11-12 RX ADMIN — PROPOFOL 150 MG: 10 INJECTION, EMULSION INTRAVENOUS at 14:15

## 2018-11-12 RX ADMIN — POTASSIUM CHLORIDE 10 MEQ: 750 CAPSULE, EXTENDED RELEASE ORAL at 21:30

## 2018-11-12 RX ADMIN — LIDOCAINE HYDROCHLORIDE 60 MG: 10 INJECTION, SOLUTION EPIDURAL; INFILTRATION; INTRACAUDAL; PERINEURAL at 14:15

## 2018-11-12 RX ADMIN — CEFAZOLIN SODIUM 2 G: 2 INJECTION, SOLUTION INTRAVENOUS at 14:20

## 2018-11-12 RX ADMIN — HYDROMORPHONE HYDROCHLORIDE 0.5 MG: 1 INJECTION, SOLUTION INTRAMUSCULAR; INTRAVENOUS; SUBCUTANEOUS at 15:56

## 2018-11-12 RX ADMIN — ATRACURIUM BESYLATE 40 MG: 10 INJECTION, SOLUTION INTRAVENOUS at 14:15

## 2018-11-12 RX ADMIN — HYDROMORPHONE HYDROCHLORIDE 0.5 MG: 1 INJECTION, SOLUTION INTRAMUSCULAR; INTRAVENOUS; SUBCUTANEOUS at 16:02

## 2018-11-12 RX ADMIN — CEFAZOLIN SODIUM 2 G: 2 INJECTION, SOLUTION INTRAVENOUS at 21:31

## 2018-11-12 RX ADMIN — CELECOXIB 200 MG: 200 CAPSULE ORAL at 11:06

## 2018-11-12 RX ADMIN — GLYCOPYRROLATE 0.1 MG: 0.2 INJECTION, SOLUTION INTRAMUSCULAR; INTRAVENOUS at 15:15

## 2018-11-12 RX ADMIN — SODIUM CHLORIDE, POTASSIUM CHLORIDE, SODIUM LACTATE AND CALCIUM CHLORIDE 9 ML/HR: 600; 310; 30; 20 INJECTION, SOLUTION INTRAVENOUS at 10:36

## 2018-11-12 RX ADMIN — PREGABALIN 75 MG: 75 CAPSULE ORAL at 10:36

## 2018-11-12 RX ADMIN — BUMETANIDE 2 MG: 2 TABLET ORAL at 21:30

## 2018-11-12 RX ADMIN — NEOSTIGMINE METHYLSULFATE 2 MG: 1 INJECTION, SOLUTION INTRAVENOUS at 15:15

## 2018-11-12 RX ADMIN — BUDESONIDE AND FORMOTEROL FUMARATE DIHYDRATE 2 PUFF: 160; 4.5 AEROSOL RESPIRATORY (INHALATION) at 20:35

## 2018-11-12 RX ADMIN — ONDANSETRON 4 MG: 2 INJECTION INTRAMUSCULAR; INTRAVENOUS at 15:15

## 2018-11-12 RX ADMIN — FENTANYL CITRATE 50 MCG: 50 INJECTION, SOLUTION INTRAMUSCULAR; INTRAVENOUS at 15:10

## 2018-11-12 RX ADMIN — ACETAMINOPHEN 1000 MG: 500 TABLET, FILM COATED ORAL at 10:36

## 2018-11-12 RX ADMIN — FENTANYL CITRATE 100 MCG: 50 INJECTION, SOLUTION INTRAMUSCULAR; INTRAVENOUS at 14:15

## 2018-11-12 RX ADMIN — LIDOCAINE HYDROCHLORIDE 0.3 ML: 10 INJECTION, SOLUTION EPIDURAL; INFILTRATION; INTRACAUDAL; PERINEURAL at 10:36

## 2018-11-12 RX ADMIN — FENTANYL CITRATE 100 MCG: 50 INJECTION, SOLUTION INTRAMUSCULAR; INTRAVENOUS at 15:25

## 2018-11-12 NOTE — OP NOTE
Date; November 12, 2018    Preoperative diagnosis; right shoulder rotator cuff arthropathy    Postoperative diagnosis; same with severe synovitis and multiple loose bodies    Procedure; right reverse total shoulder arthroplasty with extensive synovectomy    Surgeon; Robbie Silveira M.D.    Assistant; Rosette Contreras physician assistant medically required    Anesthesia; general with endotracheal airway and 30 cc of local anesthetic, cortical percent plain Marcaine, to the jodee-incisional tissues.  No interscalene block was utilized due to her significant pulmonary risk factors    Complications; none noted    Estimated blood loss; 200 mL    Implants; Arthrex    Indications; 78-year-old female with significantly advanced rotator cuff arthropathy.  Pain and functional weakness.  Failed conservative treatment.  Cleared medically for surgery.  Attempted to control all perioperative risk factors.  Noted the risks of surgery to the shoulder and cardiopulmonary status.  Informed consent was obtained.  Aware of the risks, benefits and alternatives.    Procedure performed; patient was identified and marked in the holding area.  Consent confirmed.  After the induction of anesthesia the patient was carefully padded and positioned in the 45° modified beachchair position.  Cervical spine stabilized.  Meticulous sterile prep and drape.  Surgical timeout.  Anatomic landmarks marked.  We utilized togas, Ioban and pneumatic arm aranda.  Deltopectoral approach.  Meticulous hemostasis.  Blunt release of the subacromial, subdeltoid and subcoracoid spaces.  Deep retractors were placed.  There was a significant the pseudocapsule with a large effusion.  As it was opened significant synovitis was encountered with multiple loose bodies.  A extensive synovectomy was performed.  We released the inferior capsule.  Palpated and protected the axillary nerve with a Hohmann retractor.  Standard humeral head cut at 20° retroversion was made.  Shallow  resection.  We moved to the glenoid.  Was apprised that the significant tightness.  I made aggressive release of the inferior capsule.  Superior inclination of the glenoid was appreciated.  We had good circumferential exposure and picked out of good Center point at the junction of the middle and inferior third.  We drilled for our pin making sure not to achieve superior inclination.  Neutral version.  We then completed our superficial and peripheral reaming.  Drilled and measured for our 65 screw.  Baseplate was constructed on the back table.  Was inserted by hand with excellent fist tightening.  3 peripheral locking screws were placed at the 12:00, 6:00 and 9:00 positions.  A 36+4 glenoid sphere was chosen.  She had a very small glenoid.  It was inserted, handle and the setscrew was engaged and locked.  Was carefully checked.  There was no overhanging.  We moved humerus.  We reamed and broached up to a size 6 stem.  Good axial and rotational stability.  Again very small diameter.  20° version.  Trialing arrived at a 3 mm polyethylene liner.  There was good soft tissue tension, impingement free range of motion movement and no undue excess tension.  After final irrigation and impacted the humeral components.  The final reduction was satisfactory.  We left the needle in the joint for tobramycin injection.  Closed in layers.  We placed a superficial anesthetic in the superficial soft tissues.  Absorbable subcuticular suture and glue.  Dressing and sling were applied.  Anesthesia reversed.    Stable to the PACU

## 2018-11-12 NOTE — ANESTHESIA POSTPROCEDURE EVALUATION
Patient: Cari Harper    Procedure Summary     Date:  11/12/18 Room / [facility]:   [facility]    Anesthesia Start:  1406 Anesthesia Stop:  1546    Procedure:  RIGHT REVERSE TOTAL SHOULDER REPLACEMENT (Right Shoulder) Diagnosis:      Surgeon:  Robbie Silveira MD Provider:  Jaspreet Mauro MD    Anesthesia Type:  general ASA Status:  3          Anesthesia Type: general  Last vitals  BP   100/86 (11/12/18 1545)   Temp   97.5 °F (36.4 °C) (11/12/18 1545)   Pulse   59 (11/12/18 1545)   Resp   16 (11/12/18 1545)     SpO2   97 % (11/12/18 1545)     Post Anesthesia Care and Evaluation    Patient location during evaluation: PACU  Patient participation: complete - patient participated  Level of consciousness: awake and alert  Pain management: adequate  Airway patency: patent  Anesthetic complications: No anesthetic complications  PONV Status: none  Cardiovascular status: acceptable  Respiratory status: acceptable  Hydration status: acceptable

## 2018-11-12 NOTE — H&P
Patient Name: Cari Harper  MRN: [id number]  : 1940  DOS: 2018    Attending: Robbie Silveira MD    Primary Care Provider: Traci Llamas APRN      Chief complaint:  Right shoulder pain    Subjective   Patient is a 78 y.o. female presented for scheduled surgery by Dr. Silveira.  She anticipates a right reverse total shoulder replacement today. Her shoulder has been painful with limited ROM for about 5 years. She denies recent falls.    When seen preop she is doing well. She reports chronic pain. She denies nausea, shortness of breath or chest pain.   She reports a history of DVT LE in the past.    She has a history of HTN, HLD, CHF, Afib, pulmonary HTN. She is followed by Dr. Fitch, cardiology, and received preop cardiac clearance. She is on chronic Eliquis therapy; last dose was Friday.    ( Above is noted/ agree. I saw  in her room after surgery, asleep, wakes up briefly. Family at bedside. Pt underwent right reverse TSA by , tolerated surgery well, and is admitted for further management. No c/o n/vom/sob after surgery. No Interscalene block was placed due to COPD with O2 dependence at night. )wy    Allergies:  No Known Allergies    Meds:  Medications Prior to Admission   Medication Sig Dispense Refill Last Dose   • acetaminophen (TYLENOL) 500 MG tablet Take 500 mg by mouth Every 6 (Six) Hours As Needed for Mild Pain .   Past Week at Unknown time   • budesonide-formoterol (SYMBICORT) 160-4.5 MCG/ACT inhaler Inhale 2 puffs 2 (Two) Times a Day.   2018 at 0600   • clonazePAM (KlonoPIN) 0.5 MG tablet Take 0.5 mg by mouth Daily As Needed.   2018 at 0700   • clonazePAM (KlonoPIN) 1 MG tablet Take 1 mg by mouth Every Night.   2018 at 2100   • ferrous sulfate 325 (65 FE) MG tablet Take 325 mg by mouth 2 (Two) Times a Day.   2018 at Unknown time   • flecainide (TAMBOCOR) 50 MG tablet Take 1 tablet by mouth Every 12 (Twelve) Hours. 60 tablet 11 2018  at 0700   • FLUoxetine (PROzac) 20 MG capsule Take 20 mg by mouth 2 (two) times a day.   11/12/2018 at 0700   • metaxalone (SKELAXIN) 800 MG tablet Take 1 tablet by mouth Every 6 (Six) Hours As Needed for Muscle Spasms. 12 tablet 0 Past Week at Unknown time   • O2 (OXYGEN) Inhale 2 L/min Every Night.   11/11/2018 at Unknown time   • omeprazole (priLOSEC) 20 MG capsule Take 20 mg by mouth 2 (Two) Times a Day.   11/12/2018 at 0700   • apixaban (ELIQUIS) 5 MG tablet tablet Take 1 tablet by mouth Every 12 (Twelve) Hours. 60 tablet 5 11/9/2018 at 0800   • bumetanide (BUMEX) 2 MG tablet Take 1 tablet by mouth Daily. 30 tablet 11 11/10/2018   • HYDROcodone-acetaminophen (NORCO) 7.5-325 MG per tablet Take 1 tablet by mouth Every 6 (Six) Hours As Needed (pain). (Patient taking differently: Take 1 tablet by mouth 2 (Two) Times a Day.) 12 tablet 0 Taking   • loratadine (CLARITIN) 10 MG tablet Take 10 mg by mouth Daily.   More than a month at Unknown time   • Omega-3 Fatty Acids (FISH OIL) 1200 MG capsule capsule Take 2,400 mg by mouth Every Evening.   11/10/2018 at 2100   • potassium chloride (K-DUR) 10 MEQ CR tablet Take 1 tablet by mouth Daily. 30 tablet 11 11/10/2018   • Thyroid 60 MG PO tablet Take 60 mg by mouth Every Morning.   11/10/2018       History:   Past Medical History:   Diagnosis Date   • Anxiety    • Arthritis    • Asthma    • Atrial fibrillation (CMS/HCC)    • Coronary artery disease    • Depression    • Disease of thyroid gland    • DVT (deep venous thrombosis) (CMS/HCC)    • Eczema    • GERD (gastroesophageal reflux disease)    • HLD (hyperlipidemia) 11/12/2018   • On home oxygen therapy     2L at night   • Wears dentures    • Wears glasses      Past Surgical History:   Procedure Laterality Date   • APPENDECTOMY     • CHOLECYSTECTOMY     • COLONOSCOPY     • COLONOSCOPY W/ BIOPSIES     • EYE SURGERY      cataracts   • TUBAL ABDOMINAL LIGATION       Family History   Problem Relation Age of Onset   • Cancer  Father    • No Known Problems Mother      Social History     Tobacco Use   • Smoking status: Former Smoker     Packs/day: 1.00     Years: 0.00     Pack years: 0.00     Types: Cigarettes     Start date: 1961     Last attempt to quit: 1986     Years since quittin.2   • Smokeless tobacco: Never Used   Substance Use Topics   • Alcohol use: No   • Drug use: No   She lives alone, but family lives close by. She has 4 children. She is retired housewife.    Review of Systems  All systems were reviewed and negative except for:  Respiratory: positive for  shortness of air    Vital Signs  Visit Vitals  /61 (BP Location: Right arm, Patient Position: Lying)   Pulse 61   Temp 98.3 °F (36.8 °C) (Tympanic)   Resp 18   SpO2 94%       Physical Exam:    General Appearance:    Alert, cooperative, in no acute distress( drows when I saw posotp.)wy   Head:    Normocephalic, without obvious abnormality, atraumatic   Eyes:            Lids and lashes normal, conjunctivae and sclerae normal, no   icterus, no pallor, corneas clear, PERRLA   Ears:    Ears appear intact with no abnormalities noted   Neck:   No adenopathy, supple, trachea midline, no thyromegaly, no     carotid bruit, no JVD   Lungs:     Clear to auscultation,respirations regular, even and                   unlabored    Heart:    Regular rhythm and normal rate, normal S1 and S2, no            murmur, no gallop, no rub, no click   Abdomen:     Normal bowel sounds, no masses, no organomegaly, soft        non-tender, non-distended, no guarding, no rebound                 tenderness   Genitalia:    Deferred   Extremities:   Moves all extremities well, no edema, no cyanosis, no              Redness  ( Postop: Right shoulder with CDI dressing. Distal pulses, cap refill, intact. Hand is warm. Sling is on RUE)wy   Pulses:   Pulses palpable and equal bilaterally   Skin:   No bleeding, bruising or rash   Neurologic:   Cranial nerves 2 - 12 grossly intact, sensation  intact      I reviewed the patient's new clinical results.       Results from last 7 days   Lab Units  11/11/18   1534   WBC 10*3/mm3  6.73   HEMOGLOBIN g/dL  10.8*   HEMATOCRIT %  34.6   PLATELETS 10*3/mm3  254     Results from last 7 days   Lab Units  11/11/18   1606   POTASSIUM mmol/L  3.6     Lab Results   Component Value Date    HGBA1C 5.20 11/11/2018       Assessment and Plan:     Right shoulder pain    CHF (congestive heart failure) (CMS/HCC)    Atrial fibrillation (CMS/HCC)    HTN (hypertension)    HLD (hyperlipidemia)      Plan  1. PT/OT- RUE sling, pendulum exercises  2. Pain control-prns  3. IS-encourage  4. DVT proph- mechs/ASA  5. Bowel regimen  6. Resume home medications as appropriate  7. Monitor post-op labs  8. DC planning for home with families help    HTN, HLD, afib, CHF  - Continue home tambocor, bumex  - Resume Eliquis when ok with Dr. Silveira  - Monitor BP   - Holding parameters for BP meds  - Labetalol PRN for SBP>170      ALYSA Sherman  11/12/18  2:42 PM     I have personally performed the evaluation on this patient. My history is consistent  with HPI obtained. My exam findings are listed above. I have personally reviewed and discussed the above formulated treatment plan with family and .Joseph

## 2018-11-12 NOTE — ANESTHESIA PREPROCEDURE EVALUATION
Anesthesia Evaluation                  Airway   Mallampati: I  TM distance: >3 FB  Neck ROM: full  No difficulty expected  Dental      Pulmonary    (+) pneumonia , COPD, asthma,   Cardiovascular     (+) CAD, dysrhythmias Atrial Fib, hyperlipidemia,       Neuro/Psych  GI/Hepatic/Renal/Endo    (+)  GERD,      Musculoskeletal     Abdominal    Substance History      OB/GYN          Other                        Anesthesia Plan    ASA 3     general     intravenous induction   Anesthetic plan, all risks, benefits, and alternatives have been provided, discussed and informed consent has been obtained with: patient.    Plan discussed with CRNA.

## 2018-11-12 NOTE — BRIEF OP NOTE
TOTAL SHOULDER REVERSE ARTHROPLASTY  Progress Note    Cari Harper  11/12/2018    Pre-op Diagnosis:   Right shoulder rotator cuff arthropathy       Post-Op Diagnosis Codes:   Same    Procedure/CPT® Codes:      Procedure(s):  RIGHT REVERSE TOTAL SHOULDER REPLACEMENT    Surgeon(s):  Robbie Silveira MD    Anesthesia: General with Block    Staff:   Circulator: Farshad Lopez RN  Scrub Person: Tamar Lobato  Nursing Assistant: Namrata Izquierdo  Assistant: Rosette Contreras PA    Estimated Blood Loss: 200ml    Urine Voided: * No values recorded between 11/12/2018  2:06 PM and 11/12/2018  2:15 PM *    Specimens:                None      Drains:      Findings: Please see operative note    Complications: None noted      Robbie Silveira MD     Date: 11/12/2018  Time: 2:15 PM

## 2018-11-12 NOTE — H&P
Pre-Op H&P  Cari Harper  [id number]  1940    Chief complaint: Right shoulder pain    HPI:    Patient is a 78 y.o.female who presents with post traumatic osteoarthritis, right shoulder and here today for right reverse total shoulder replacement.     Review of Systems:  General ROS: negative for chills, fever or skin lesions;  No changes since last office visit  Cardiovascular ROS: no chest pain or dyspnea on exertion.  +cardiac clearance  Respiratory ROS: no cough, shortness of breath, or wheezing    Allergies: No Known Allergies    Home Meds:    No current facility-administered medications on file prior to encounter.      Current Outpatient Medications on File Prior to Encounter   Medication Sig Dispense Refill   • acetaminophen (TYLENOL) 500 MG tablet Take 500 mg by mouth Every 6 (Six) Hours As Needed for Mild Pain .     • clonazePAM (KlonoPIN) 0.5 MG tablet Take 0.5 mg by mouth Daily As Needed.     • clonazePAM (KlonoPIN) 1 MG tablet Take 1 mg by mouth Every Night.     • ferrous sulfate 325 (65 FE) MG tablet Take 325 mg by mouth 2 (Two) Times a Day.     • flecainide (TAMBOCOR) 50 MG tablet Take 1 tablet by mouth Every 12 (Twelve) Hours. 60 tablet 11   • FLUoxetine (PROzac) 20 MG capsule Take 20 mg by mouth 2 (two) times a day.     • metaxalone (SKELAXIN) 800 MG tablet Take 1 tablet by mouth Every 6 (Six) Hours As Needed for Muscle Spasms. 12 tablet 0   • apixaban (ELIQUIS) 5 MG tablet tablet Take 1 tablet by mouth Every 12 (Twelve) Hours. 60 tablet 5   • bumetanide (BUMEX) 2 MG tablet Take 1 tablet by mouth Daily. 30 tablet 11   • HYDROcodone-acetaminophen (NORCO) 7.5-325 MG per tablet Take 1 tablet by mouth Every 6 (Six) Hours As Needed (pain). (Patient taking differently: Take 1 tablet by mouth 2 (Two) Times a Day.) 12 tablet 0   • loratadine (CLARITIN) 10 MG tablet Take 10 mg by mouth Daily.     • Omega-3 Fatty Acids (FISH OIL) 1200 MG capsule capsule Take 2,400 mg by mouth Every Evening.     •  potassium chloride (K-DUR) 10 MEQ CR tablet Take 1 tablet by mouth Daily. 30 tablet 11   • Thyroid 60 MG PO tablet Take 60 mg by mouth Every Morning.         PMH:   Past Medical History:   Diagnosis Date   • Anxiety    • Arthritis    • Asthma    • Atrial fibrillation (CMS/HCC)    • Coronary artery disease    • Depression    • Disease of thyroid gland    • DVT (deep venous thrombosis) (CMS/HCC)    • Eczema    • GERD (gastroesophageal reflux disease)    • On home oxygen therapy     2L at night   • Wears dentures    • Wears glasses      PSH:    Past Surgical History:   Procedure Laterality Date   • APPENDECTOMY     • CHOLECYSTECTOMY     • COLONOSCOPY     • COLONOSCOPY W/ BIOPSIES     • EYE SURGERY      cataracts   • TUBAL ABDOMINAL LIGATION       Social History:   Tobacco:   Social History     Tobacco Use   Smoking Status Former Smoker   • Packs/day: 1.00   • Years: 0.00   • Pack years: 0.00   • Types: Cigarettes   • Start date: 1961   • Last attempt to quit: 1986   • Years since quittin.2   Smokeless Tobacco Never Used      Alcohol:     Social History     Substance and Sexual Activity   Alcohol Use No       Vitals:           /61 (BP Location: Right arm, Patient Position: Lying)   Pulse 61   Temp 98.3 °F (36.8 °C) (Tympanic)   Resp 18   SpO2 94%     Physical Exam:  General Appearance:    Alert, cooperative, no distress, appears stated age   Head:    Normocephalic, without obvious abnormality, atraumatic   Lungs:     Clear to auscultation bilaterally, respirations unlabored    Heart:   Regular rate and rhythm, S1 and S2 normal, no murmur, rub    or gallop    Abdomen:    Soft, non-tender.  +bowel sounds   Breast Exam:    deferred   Genitalia:    deferred   Extremities:   Extremities normal, atraumatic, no cyanosis or edema   Skin:   Skin color, texture, turgor normal, no rashes or lesions   Neurologic:   Grossly intact   Results Review  I reviewed the patient's new clinical results.    Cancer  Staging (if applicable)  Cancer Patient: __ yes _x_no __unknown; If yes, clinical stage T:__ N:__M:__, stage group or __N/A    Impression: Post traumatic osteoarthritis, right shoulder    Plan: Right reverse total shoulder replacement    Nemo Wayne, APRN   11/12/2018   12:07 PM

## 2018-11-12 NOTE — ANESTHESIA PROCEDURE NOTES
ANESTHESIA INTUBATION  Urgency: elective    Airway not difficult    General Information and Staff    Patient location during procedure: OR    Indications and Patient Condition  Indications for airway management: airway protection    Preoxygenated: yes  MILS not maintained throughout  Mask difficulty assessment: 1 - vent by mask    Final Airway Details  Final airway type: endotracheal airway      Successful airway: ETT  Cuffed: yes   Successful intubation technique: direct laryngoscopy  Endotracheal tube insertion site: oral  Blade: Pooja  Blade size: 3  ETT size (mm): 6.5  Cormack-Lehane Classification: grade I - full view of glottis  Placement verified by: chest auscultation and capnometry   Number of attempts at approach: 1    Additional Comments  Negative epigastric sounds, Breath sound equal bilaterally with symmetric chest rise and fall

## 2018-11-13 VITALS
TEMPERATURE: 96.8 F | OXYGEN SATURATION: 92 % | SYSTOLIC BLOOD PRESSURE: 155 MMHG | DIASTOLIC BLOOD PRESSURE: 79 MMHG | HEART RATE: 61 BPM | RESPIRATION RATE: 16 BRPM

## 2018-11-13 LAB
ANION GAP SERPL CALCULATED.3IONS-SCNC: 7 MMOL/L (ref 3–11)
BACTERIA UR QL AUTO: ABNORMAL /HPF
BASOPHILS # BLD AUTO: 0 10*3/MM3 (ref 0–0.2)
BASOPHILS NFR BLD AUTO: 0 % (ref 0–1)
BILIRUB UR QL STRIP: NEGATIVE
BUN BLD-MCNC: 15 MG/DL (ref 9–23)
BUN/CREAT SERPL: 20 (ref 7–25)
CALCIUM SPEC-SCNC: 8.8 MG/DL (ref 8.7–10.4)
CHLORIDE SERPL-SCNC: 101 MMOL/L (ref 99–109)
CLARITY UR: CLEAR
CO2 SERPL-SCNC: 27 MMOL/L (ref 20–31)
COLOR UR: YELLOW
CREAT BLD-MCNC: 0.75 MG/DL (ref 0.6–1.3)
DEPRECATED RDW RBC AUTO: 53.4 FL (ref 37–54)
EOSINOPHIL # BLD AUTO: 0 10*3/MM3 (ref 0–0.3)
EOSINOPHIL NFR BLD AUTO: 0 % (ref 0–3)
ERYTHROCYTE [DISTWIDTH] IN BLOOD BY AUTOMATED COUNT: 17.2 % (ref 11.3–14.5)
GFR SERPL CREATININE-BSD FRML MDRD: 75 ML/MIN/1.73
GLUCOSE BLD-MCNC: 124 MG/DL (ref 70–100)
GLUCOSE UR STRIP-MCNC: NEGATIVE MG/DL
HCT VFR BLD AUTO: 32.7 % (ref 34.5–44)
HGB BLD-MCNC: 10.1 G/DL (ref 11.5–15.5)
HGB UR QL STRIP.AUTO: NEGATIVE
HYALINE CASTS UR QL AUTO: ABNORMAL /LPF
IMM GRANULOCYTES # BLD: 0.01 10*3/MM3 (ref 0–0.03)
IMM GRANULOCYTES NFR BLD: 0.1 % (ref 0–0.6)
KETONES UR QL STRIP: NEGATIVE
LEUKOCYTE ESTERASE UR QL STRIP.AUTO: ABNORMAL
LYMPHOCYTES # BLD AUTO: 0.54 10*3/MM3 (ref 0.6–4.8)
LYMPHOCYTES NFR BLD AUTO: 7.9 % (ref 24–44)
MCH RBC QN AUTO: 26.3 PG (ref 27–31)
MCHC RBC AUTO-ENTMCNC: 30.9 G/DL (ref 32–36)
MCV RBC AUTO: 85.2 FL (ref 80–99)
MONOCYTES # BLD AUTO: 0.37 10*3/MM3 (ref 0–1)
MONOCYTES NFR BLD AUTO: 5.4 % (ref 0–12)
NEUTROPHILS # BLD AUTO: 5.95 10*3/MM3 (ref 1.5–8.3)
NEUTROPHILS NFR BLD AUTO: 86.7 % (ref 41–71)
NITRITE UR QL STRIP: NEGATIVE
PH UR STRIP.AUTO: 7 [PH] (ref 5–8)
PLATELET # BLD AUTO: 241 10*3/MM3 (ref 150–450)
PMV BLD AUTO: 10.6 FL (ref 6–12)
POTASSIUM BLD-SCNC: 3.6 MMOL/L (ref 3.5–5.5)
PROT UR QL STRIP: NEGATIVE
RBC # BLD AUTO: 3.84 10*6/MM3 (ref 3.89–5.14)
RBC # UR: ABNORMAL /HPF
REF LAB TEST METHOD: ABNORMAL
SODIUM BLD-SCNC: 135 MMOL/L (ref 132–146)
SP GR UR STRIP: 1.01 (ref 1–1.03)
SQUAMOUS #/AREA URNS HPF: ABNORMAL /HPF
UROBILINOGEN UR QL STRIP: ABNORMAL
WBC NRBC COR # BLD: 6.86 10*3/MM3 (ref 3.5–10.8)
WBC UR QL AUTO: ABNORMAL /HPF

## 2018-11-13 PROCEDURE — 63710000001 FLUOXETINE 20 MG CAPSULE: Performed by: NURSE PRACTITIONER

## 2018-11-13 PROCEDURE — 85025 COMPLETE CBC W/AUTO DIFF WBC: CPT | Performed by: ORTHOPAEDIC SURGERY

## 2018-11-13 PROCEDURE — A9270 NON-COVERED ITEM OR SERVICE: HCPCS | Performed by: NURSE PRACTITIONER

## 2018-11-13 PROCEDURE — 63710000001 CETIRIZINE 10 MG TABLET: Performed by: NURSE PRACTITIONER

## 2018-11-13 PROCEDURE — 63710000001 ASPIRIN EC 325 MG TABLET DELAYED-RELEASE: Performed by: ORTHOPAEDIC SURGERY

## 2018-11-13 PROCEDURE — 63710000001 PANTOPRAZOLE 40 MG TABLET DELAYED-RELEASE: Performed by: NURSE PRACTITIONER

## 2018-11-13 PROCEDURE — 97162 PT EVAL MOD COMPLEX 30 MIN: CPT

## 2018-11-13 PROCEDURE — G8979 MOBILITY GOAL STATUS: HCPCS

## 2018-11-13 PROCEDURE — 97166 OT EVAL MOD COMPLEX 45 MIN: CPT | Performed by: OCCUPATIONAL THERAPIST

## 2018-11-13 PROCEDURE — 80048 BASIC METABOLIC PNL TOTAL CA: CPT | Performed by: ORTHOPAEDIC SURGERY

## 2018-11-13 PROCEDURE — G8978 MOBILITY CURRENT STATUS: HCPCS

## 2018-11-13 PROCEDURE — A9270 NON-COVERED ITEM OR SERVICE: HCPCS | Performed by: ORTHOPAEDIC SURGERY

## 2018-11-13 PROCEDURE — 63710000001 BUMETANIDE 2 MG TABLET: Performed by: NURSE PRACTITIONER

## 2018-11-13 PROCEDURE — 63710000001 THYROID 30 MG TABLET: Performed by: NURSE PRACTITIONER

## 2018-11-13 PROCEDURE — 63710000001 POTASSIUM CHLORIDE 10 MEQ CAPSULE CONTROLLED-RELEASE: Performed by: NURSE PRACTITIONER

## 2018-11-13 PROCEDURE — 97110 THERAPEUTIC EXERCISES: CPT | Performed by: OCCUPATIONAL THERAPIST

## 2018-11-13 PROCEDURE — 87086 URINE CULTURE/COLONY COUNT: CPT | Performed by: NURSE PRACTITIONER

## 2018-11-13 PROCEDURE — 25010000003 CEFAZOLIN IN DEXTROSE 2-4 GM/100ML-% SOLUTION: Performed by: ORTHOPAEDIC SURGERY

## 2018-11-13 PROCEDURE — 97535 SELF CARE MNGMENT TRAINING: CPT | Performed by: OCCUPATIONAL THERAPIST

## 2018-11-13 PROCEDURE — 63710000001 FLECAINIDE 50 MG TABLET: Performed by: NURSE PRACTITIONER

## 2018-11-13 PROCEDURE — P9612 CATHETERIZE FOR URINE SPEC: HCPCS

## 2018-11-13 PROCEDURE — 81001 URINALYSIS AUTO W/SCOPE: CPT | Performed by: NURSE PRACTITIONER

## 2018-11-13 PROCEDURE — 94640 AIRWAY INHALATION TREATMENT: CPT

## 2018-11-13 RX ORDER — NITROFURANTOIN 25; 75 MG/1; MG/1
100 CAPSULE ORAL EVERY 12 HOURS SCHEDULED
Qty: 10 CAPSULE | Refills: 0 | Status: SHIPPED | OUTPATIENT
Start: 2018-11-13 | End: 2018-11-13 | Stop reason: SDUPTHER

## 2018-11-13 RX ORDER — OXYCODONE HYDROCHLORIDE AND ACETAMINOPHEN 5; 325 MG/1; MG/1
1-2 TABLET ORAL EVERY 4 HOURS PRN
Qty: 60 TABLET | Refills: 0
Start: 2018-11-13 | End: 2019-03-07

## 2018-11-13 RX ORDER — OXYCODONE AND ACETAMINOPHEN 7.5; 325 MG/1; MG/1
1 TABLET ORAL EVERY 4 HOURS PRN
Start: 2018-11-13 | End: 2018-11-22

## 2018-11-13 RX ORDER — NITROFURANTOIN 25; 75 MG/1; MG/1
100 CAPSULE ORAL EVERY 12 HOURS SCHEDULED
Qty: 10 CAPSULE | Refills: 0 | Status: SHIPPED | OUTPATIENT
Start: 2018-11-13 | End: 2019-08-21

## 2018-11-13 RX ORDER — PSEUDOEPHEDRINE HCL 30 MG
100 TABLET ORAL 2 TIMES DAILY PRN
Start: 2018-11-13 | End: 2019-09-12

## 2018-11-13 RX ADMIN — CETIRIZINE HYDROCHLORIDE 10 MG: 10 TABLET, FILM COATED ORAL at 08:11

## 2018-11-13 RX ADMIN — POTASSIUM CHLORIDE 10 MEQ: 750 CAPSULE, EXTENDED RELEASE ORAL at 08:12

## 2018-11-13 RX ADMIN — CEFAZOLIN SODIUM 2 G: 2 INJECTION, SOLUTION INTRAVENOUS at 05:34

## 2018-11-13 RX ADMIN — PANTOPRAZOLE SODIUM 40 MG: 40 TABLET, DELAYED RELEASE ORAL at 05:34

## 2018-11-13 RX ADMIN — FLECAINIDE ACETATE 50 MG: 50 TABLET ORAL at 08:11

## 2018-11-13 RX ADMIN — FLUOXETINE HYDROCHLORIDE 20 MG: 20 CAPSULE ORAL at 08:12

## 2018-11-13 RX ADMIN — ASPIRIN 325 MG: 325 TABLET, DELAYED RELEASE ORAL at 08:11

## 2018-11-13 RX ADMIN — THYROID, PORCINE 60 MG: 30 TABLET ORAL at 05:34

## 2018-11-13 RX ADMIN — BUMETANIDE 2 MG: 2 TABLET ORAL at 08:11

## 2018-11-13 RX ADMIN — BUDESONIDE AND FORMOTEROL FUMARATE DIHYDRATE 2 PUFF: 160; 4.5 AEROSOL RESPIRATORY (INHALATION) at 08:42

## 2018-11-13 NOTE — DISCHARGE SUMMARY
Patient Name: Cari Harper  MRN: [id number]  : 1940  DOS: 2018    Attending: No att. providers found    Primary Care Provider: Traci Llamas APRN    Date of Admission:.2018  9:54 AM    Date of Discharge:  2018    Discharge Diagnosis:    S/P right reverse total shoulder arthroplasty     Right shoulder pain    CHF (congestive heart failure) (CMS/HCC)    Atrial fibrillation (CMS/HCC)    HTN (hypertension)    HLD (hyperlipidemia)    Shoulder arthritis    ?UTI     Hospital Course  Patient is a 78 y.o. female presented for right reverse total shoulder arthroplasty by Dr. Silveira under [place].  She tolerated surgery well and was admitted for further medical management. Her shoulder has been painful with limited ROM for about 5 years. She denies recent falls.     She reports a history of DVT LE in the past.    She has a history of HTN, HLD, CHF, Afib, pulmonary HTN. She is followed by Dr. Fitch, cardiology, and received preop cardiac clearance. She is on chronic Eliquis therapy.    Patient was provided pain medications as needed for pain control.    Adjustments were made to pain medications to optimize postop pain management. Risks and benefits of opiate medications discussed with patient.    The patient was seen by OT and was taught exercises for her right arm.  The patient used an IS for atelectasis prophylaxis and mechanicals for DVT prophylaxis.    Home medications were resumed as appropriate, and labs were monitored and remained fairly stable.   She will be discharged with oral antibiotics for possible UTI, culture with no growth.    With the progress she has made, she is ready for DC home today.    Discussed with patient regarding plan and she shows understanding and agreement.          Procedures Performed  2018     Preoperative diagnosis; right shoulder rotator cuff arthropathy     Postoperative diagnosis; same with severe synovitis and multiple loose  bodies     Procedure; right reverse total shoulder arthroplasty with extensive synovectomy     Surgeon; Robbie Silveira M.D.      Pertinent Test Results:    I reviewed the patient's new clinical results.   Results from last 7 days   Lab Units  11/13/18   0456  11/11/18   1534   WBC 10*3/mm3  6.86  6.73   HEMOGLOBIN g/dL  10.1*  10.8*   HEMATOCRIT %  32.7*  34.6   PLATELETS 10*3/mm3  241  254     Results from last 7 days   Lab Units  11/13/18   0456  11/11/18   1606   SODIUM mmol/L  135   --    POTASSIUM mmol/L  3.6  3.6   CHLORIDE mmol/L  101   --    CO2 mmol/L  27.0   --    BUN mg/dL  15   --    CREATININE mg/dL  0.75   --    CALCIUM mg/dL  8.8   --    GLUCOSE mg/dL  124*   --      Results for [place] (MRN [id number]) as of 11/14/2018 15:41   Ref. Range 11/13/2018 09:10   Color, UA Latest Ref Range: Yellow, Straw  Yellow   Appearance, UA Latest Ref Range: Clear  Clear   Specific [place], UA Latest Ref Range: 1.001 - 1.030  1.010   pH, UA Latest Ref Range: 5.0 - 8.0  7.0   Glucose, UA Latest Ref Range: Negative  Negative   Ketones, UA Latest Ref Range: Negative  Negative   Blood, UA Latest Ref Range: Negative  Negative   Nitrite, UA Latest Ref Range: Negative  Negative   Leuk Esterase, UA Latest Ref Range: Negative  Trace (A)   Protein, UA Latest Ref Range: Negative  Negative   Bilirubin, UA Latest Ref Range: Negative  Negative   Urobilinogen, UA Latest Ref Range: 0.2 - 1.0 E.U./dL  0.2 E.U./dL   RBC, UA Latest Ref Range: None Seen, 0-2 /HPF None Seen   WBC, UA Latest Ref Range: None Seen, 0-2 /HPF 6-12 (A)   Bacteria, UA Latest Ref Range: None Seen, Trace /HPF None Seen   Squamous Epithelial Cells, UA Latest Ref Range: None Seen, 0-2 /HPF 0-2   Hyaline Casts, UA Latest Ref Range: 0 - 6 /LPF 0-6     Urine Culture - Urine, Urine, Clean Catch   Order: [id number] - Reflex for Order [id number]   Collected:  11/13/2018 09:10 Status:  Preliminary result   Visible to patient:  No (Not Released)   Specimen Information:  Urine, Clean Catch        Urine Culture No growth          Resulting Agency:  [facility]         Specimen Collected: 11/13/18 09:10 Last Resulted: 11/14/18 09:43             I reviewed the patient's new imaging including images and reports.      Physical therapy: PT migdalia completed.  Pt. s/p R TSA who presents w/ mild balance deficits and gait instability (family reports is baseline).  Pt. performed sit to stand transfers from EOB and BSC w/ CGA and straight cane.  Pt. ambulated 60 ft w/ SPC, CGA, and VCs for safety w/ increased c/o fatigue.  Recommended use of straight cane to pt/ sons for increased safety/dec fall risk w/ ambulation and improved activity tolerance.  Pt. declined, stating she did not need.  Pt. will benefit from skilled IPPT to address impairments and maximize patient's safety and ind. w/ mobility.  Recommend home w/ assist at D/C.    Discharge Assessment:    Vital Signs  Visit Vitals  /79   Pulse 61   Temp 96.8 °F (36 °C) (Tympanic)   Resp 16   SpO2 92%         General Appearance:    Alert, cooperative, in no acute distress   Lungs:     Clear to auscultation,respirations regular, even and                   unlabored    Heart:    Regular rhythm and normal rate, normal S1 and S2   Abdomen:     Normal bowel sounds, no masses, no organomegaly, soft        non-tender, non-distended, no guarding, no rebound                 tenderness   Extremities:   Right shoulder with CDI dressing. Distal pulses, cap refill, intact. Hand is warm. Sling is on RUE   Pulses:   Pulses palpable and equal bilaterally   Skin:   No bleeding, bruising or rash   Neurologic:   Cranial nerves 2 - 12 grossly intact, sensation intact       Discharge Disposition: Home    Discharge Medications     Discharge Medications      New Medications      Instructions Start Date   docusate sodium 100 MG capsule   100 mg, Oral, 2 Times Daily PRN, Over the counter      nitrofurantoin (macrocrystal-monohydrate) 100 MG capsule  Commonly known  as:  MACROBID   100 mg, Oral, Every 12 Hours Scheduled      oxyCODONE-acetaminophen 7.5-325 MG per tablet  Commonly known as:  PERCOCET   1 tablet, Oral, Every 4 Hours PRN      oxyCODONE-acetaminophen 5-325 MG per tablet  Commonly known as:  PERCOCET   1-2 tablets, Oral, Every 4 Hours PRN         Continue These Medications      Instructions Start Date   acetaminophen 500 MG tablet  Commonly known as:  TYLENOL   500 mg, Oral, Every 6 Hours PRN      apixaban 5 MG tablet tablet  Commonly known as:  ELIQUIS   5 mg, Oral, Every 12 Hours Scheduled      budesonide-formoterol 160-4.5 MCG/ACT inhaler  Commonly known as:  SYMBICORT   2 puffs, Inhalation, 2 Times Daily - RT      bumetanide 2 MG tablet  Commonly known as:  BUMEX   2 mg, Oral, Daily      clonazePAM 0.5 MG tablet  Commonly known as:  KlonoPIN   0.5 mg, Oral, Daily PRN      clonazePAM 1 MG tablet  Commonly known as:  KlonoPIN   1 mg, Oral, Nightly      ferrous sulfate 325 (65 FE) MG tablet   325 mg, Oral, 2 Times Daily      fish oil 1200 MG capsule capsule   2,400 mg, Oral, Every Evening      flecainide 50 MG tablet  Commonly known as:  TAMBOCOR   50 mg, Oral, Every 12 Hours Scheduled      FLUoxetine 20 MG capsule  Commonly known as:  PROzac   20 mg, Oral, 2 Times Daily      loratadine 10 MG tablet  Commonly known as:  CLARITIN   10 mg, Oral, Daily      metaxalone 800 MG tablet  Commonly known as:  SKELAXIN   800 mg, Oral, Every 6 Hours PRN      O2  Commonly known as:  OXYGEN   2 L/min, Inhalation, Nightly      omeprazole 20 MG capsule  Commonly known as:  priLOSEC   20 mg, Oral, 2 Times Daily      potassium chloride 10 MEQ CR tablet  Commonly known as:  K-DUR   10 mEq, Oral, Daily      Thyroid 60 MG tablet  Commonly known as:  ARMOUR   60 mg, Oral, Every Morning         Stop These Medications    HYDROcodone-acetaminophen 7.5-325 MG per tablet  Commonly known as:  NORCO            Discharge Diet: Regular diet    Activity at Discharge: RUE sling, pendulum  exercise    Follow-up Appointments  Dr. Silveira per his orders    Discharge took over 30 min.    ALYSA Sherman  11/14/18  3:37 PM    I have personally performed the evaluation on this patient. My history is consistant  with above documentation . My exam finding are listed above. I have personally reviewed and discussed the above formulated discharge plan with patient, family  and APRN.wy.

## 2018-11-13 NOTE — PROGRESS NOTES
Discharge Planning Assessment  [facility]     Patient Name: Cari Harper  MRN: [id number]  Today's Date: 11/13/2018    Admit Date: 11/12/2018    Discharge Needs Assessment     Row Name 11/13/18 1221       Living Environment    Lives With  alone    Current Living Arrangements  home/apartment/condo    Family Caregiver if Needed  child(rudy), adult    Quality of Family Relationships  helpful;involved;supportive    Able to Return to Prior Arrangements  yes    Living Arrangement Comments  Ms. Harper lives alone in a one story home in [place].  She has 4 children that live in the same subdivision and are available to assist her at home as needed.       Resource/Environmental Concerns    Transportation Concerns  car, none       Transition Planning    Patient/Family Anticipates Transition to  home with family       Discharge Needs Assessment    Equipment Currently Used at Home  oxygen;shower chair;rollator;wheelchair QHS O2, unknown provider    Equipment Needed After Discharge  none    Outpatient/Agency/Support Group Needs  homecare agency    Discharge Facility/Level of Care Needs  home with home health    Offered/Gave Vendor List  yes    Patient's Choice of Community Agency(s)  [facility]        Discharge Plan     Row Name 11/13/18 1223       Plan    Plan  Home with family assistance and [facility]    Patient/Family in Agreement with Plan  yes    Plan Comments Met with Ms. Harper and her son, Doroteo, at the bedside for discharge planning.  Ms. Harper is ambulating independently. She will have sufficient assistance at home from her 4 children that live close by.  She denies any DME needs.  Discussed physical therapy and Ms. Harper requested [facility] in [place].  Called and faxed home health referral to Norm[facility] at [facility].  Ms. Harper is being transported home by her son by private vehicle.      Final Discharge Disposition Code  06 - home with home health care         Destination      No service coordination in this encounter.      Durable Medical Equipment      No service coordination in this encounter.      Dialysis/Infusion      No service coordination in this encounter.      Home Medical Care - Selection Complete      Service Provider Request Status Selected Services Address Phone Number Fax Number    [facility] [address], [place] [address] [phone] --      Community Resources      No service coordination in this encounter.        Expected Discharge Date and Time     Expected Discharge Date Expected Discharge Time    Nov 13, 2018         Demographic Summary     Row Name 11/13/18 1219       General Information    Admission Type  inpatient    Arrived From  home    Reason for Consult  discharge planning    General Information Comments  PCP:  Traci Moss       Contact Information    Permission Granted to Share Info With      Contact Information Comments  Son:  elva Sadler [phone]        Functional Status     Row Name 11/13/18 1220       Functional Status    Usual Activity Tolerance  good    Current Activity Tolerance  -- See OT notes       Functional Status, IADL    Medications  independent    Meal Preparation  independent    Housekeeping  independent    Laundry  independent    Shopping  independent       Mental Status    General Appearance WDL  WDL        Psychosocial    No documentation.       Abuse/Neglect    No documentation.       Legal     Row Name 11/13/18 1220       Financial/Legal    Who Manages Finances if Patient Unable  No advance directives    Finance Comments  Ms. Harper has prescription drug coverage with Medicare, [employer] and [employer].  Verified insurance with patient.        Substance Abuse    No documentation.       Patient Forms    No documentation.           Rossana Sanchez

## 2018-11-13 NOTE — PLAN OF CARE
Problem: Patient Care Overview  Goal: Plan of Care Review  Outcome: Outcome(s) achieved Date Met: 11/13/18 11/13/18 0916   Coping/Psychosocial   Plan of Care Reviewed With patient   OTHER   Outcome Summary Pt presents s/p reverse R TSA with report of 0/0 pain. Pt impulsive with safety and with mild balance deficits, however family notes that this is baseline. OT deferred balance assessment to PT. Family present for all teaching and will be available at d/c 24/7. Family demonstrates appropriate teach-back with ADLs, HEP, sling management and precautions. Plan to d/c home this p.m.

## 2018-11-13 NOTE — PROGRESS NOTES
Subjective:  The patient rested comfortably overnight with an expected amount of postoperative pain.  No events overnight. The patient denies any chest pain/shortness of breath/fever/chills or any other systemic symptoms.    Medications/Allergies:  Scheduled Meds:  aspirin 325 mg Oral Daily   budesonide-formoterol 2 puff Inhalation BID - RT   bumetanide 2 mg Oral Daily   cetirizine 10 mg Oral Daily   clonazePAM 1 mg Oral Nightly   flecainide 50 mg Oral Q12H   FLUoxetine 20 mg Oral Q12H   O2 2 L/min Inhalation Nightly   pantoprazole 40 mg Oral QAM   potassium chloride 10 mEq Oral Daily   Thyroid 60 mg Oral QAM     Continuous Infusions:  lactated ringers 9 mL/hr Last Rate: 9 mL/hr (11/12/18 1036)     PRN Meds:.clonazePAM  •  clonazePAM  •  docusate sodium  •  HYDROmorphone **AND** naloxone  •  metaxalone  •  ondansetron **OR** ondansetron  •  oxyCODONE-acetaminophen  •  oxyCODONE-acetaminophen  Patient has no known allergies.    Objective:  Vital Signs   Temp:  [96.8 °F (36 °C)-98 °F (36.7 °C)] 96.8 °F (36 °C)  Heart Rate:  [57-67] 61  Resp:  [15-20] 16  BP: ()/(44-86) 155/79    Results Review:   I reviewed the patient's new clinical results.  Results from last 7 days   Lab Units  11/13/18   0456   WBC 10*3/mm3  6.86   HEMOGLOBIN g/dL  10.1*   HEMATOCRIT %  32.7*   PLATELETS 10*3/mm3  241     Results from last 7 days   Lab Units  11/13/18   0456   SODIUM mmol/L  135   POTASSIUM mmol/L  3.6   CHLORIDE mmol/L  101   CO2 mmol/L  27.0   BUN mg/dL  15   CREATININE mg/dL  0.75   GLUCOSE mg/dL  124*   CALCIUM mg/dL  8.8         Results from last 7 days   Lab Units  11/13/18   0456   SODIUM mmol/L  135   POTASSIUM mmol/L  3.6   CHLORIDE mmol/L  101   CO2 mmol/L  27.0   BUN mg/dL  15   CREATININE mg/dL  0.75   CALCIUM mg/dL  8.8   GLUCOSE mg/dL  124*       Imaging Results (last 24 hours)     Procedure Component Value Units Date/Time    XR Shoulder 1 View Right [[id number]] Collected:  11/13/18 1311     Updated:  11/13/18  1311    Narrative:       EXAMINATION: XR SHOULDER 1 VW, RIGHT-11/12/2018:      INDICATION: Shoulder pain, prior x-ray, rotator cuff tear/impingement  suspected.      COMPARISON: 03/03/2013.     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 The prosthetic  device is well positioned with no loosening or dislocation. There is no  fracture.           Impression:       Expected postoperative findings.     D:  11/13/2018  E:  11/13/2018                   Physical Exam:  General: comfortable  Vascular:digits are pink and well perfused  Operative Shoulder Exam: sling is in place  Neurologic: sensation to light touch is intact distally, Intact sensation over the deltoid  Dermatologic: surgical dressing is well appearing - clean, dry, and intact; no obvious signs or symptoms of infection or DVT    Assessment/Plan:  The patient is comfortable and does desire to be discharged home today.  We will make arrangements for discharge.  I appreciate the medical management of the internal medicine team.  The patient is okay for discharge once cleared by the medical team.    POD #: 1  Pain control - well controlled  Dressing - clean and can be removed by patient on POD#3 at home.  Okay for RN to replace prior to discharge if dressing is saturated  Sling - in place  PT/OT - sling teaching, non-weight bearing, elbow/wrist/hand exercises only, pendulums for showering on POD #3 at home and for dressing    I discussed the patients findings and my recommendations with patient    JULIO Elliott  11/13/18  1:33 PM

## 2018-11-13 NOTE — DISCHARGE PLACEMENT REQUEST
"Milly Hill (78 y.o. Female)   Home Health Referral.  From Rossana Sanhcez RN BSN, Case Management, [phone]    Date of Birth Social Security Number Address Home Phone MRN    1940  [facility] [address] [place] [address] [phone] [id number]    Roman [employer] Marital Status          None Unknown       Admission Date Admission Type Admitting Provider Attending Provider Department, Room/Bed    11/12/18 Elective Robbie Silveira MD Wilkes, Trevor, MD [facility] 3G, S352/1    Discharge Date Discharge Disposition Discharge Destination         Home or Self Care              Attending Provider:  Robbie Silveira MD    Allergies:  No Known Allergies    Isolation:  None   Infection:  None   Code Status:  CPR    Ht:  154.9 cm (61\")   Wt:  82 kg (180 lb 12.4 oz)    Admission Cmt:  None   Principal Problem:  None                Active Insurance as of 11/12/2018     Primary Coverage     Payor Plan Insurance Group Employer/Plan Group    MEDICARE MEDICARE A & B      Payor Plan Address Payor Plan Phone Number Payor Plan Fax Number Effective Dates    [address] [phone]  3/1/2004 - None Entered    [facility] [address]       Subscriber Name Subscriber Birth Date Member ID       MILLY HILL 1940 [id number]           Secondary Coverage     Payor Plan Insurance Group Employer/Plan Group    [facility] SUPP [id number]     Payor Plan Address Payor Plan Phone Number Payor Plan Fax Number Effective Dates    [address]   12/1/2016 - None Entered    [facility] [id number]       Subscriber Name Subscriber Birth Date Member ID       MILLY HILL 1940 [id number]                 Emergency Contacts      (Rel.) Home Phone Work Phone Mobile Phone    Doroteo Hill (Son) [phone] -- [phone]    Shin Hill (Son) [phone] -- [phone]        [facility] 3G  [address] [facility] [address]  Phone:  [phone]  Fax:          Patient:   "   Cari Harper MRN:  [id number]   [facility] [address] [place] [address] :  1940  SSN:    Phone: [phone] Sex:  F      INSURANCE PAYOR PLAN GROUP # SUBSCRIBER ID   Primary:  Secondary:    MEDICARE  [facility] NAHUN CROSS [id number]  [id number]    [id number]  [id number]   Admitting Diagnosis: Shoulder arthritis [M19.019]  Order Date:  2018         Inpatient Case Management  Consult       (Order ID: [id number])     Diagnosis:         Priority:  Routine Expected Date:   Expiration Date:        Interval:   Count:    Reason for Consult? Home health physical therapy     Specimen Type:   Specimen Source:   Specimen Taken Date:   Specimen Taken Time:                   Authorizing Provider:Robbie Silveira MD  Authorizing Provider's NPI: [id number]  Order Entered By: Robbie Silveira MD 2018  4:32 PM     Electronically signed by: Robbie Silveira MD 2018  4:32 PM                 History & Physical      Siena Bradshaw MD at 2018  2:33 PM          Patient Name: Cari Harper  MRN: [id number]  : 1940  DOS: 2018    Attending: Robbie Silveira MD    Primary Care Provider: Traci Llamas APRN      Chief complaint:  Right shoulder pain    Subjective   Patient is a 78 y.o. female presented for scheduled surgery by Dr. Silveira.  She anticipates a right reverse total shoulder replacement today. Her shoulder has been painful with limited ROM for about 5 years. She denies recent falls.    When seen preop she is doing well. She reports chronic pain. She denies nausea, shortness of breath or chest pain.   She reports a history of DVT LE in the past.    She has a history of HTN, HLD, CHF, Afib, pulmonary HTN. She is followed by Dr. Fitch, cardiology, and received preop cardiac clearance. She is on chronic Eliquis therapy; last dose was Friday.    ( Above is noted/ agree. I saw  in her room after surgery, asleep, wakes up briefly. Family at  bedside. Pt underwent right reverse TSA by , tolerated surgery well, and is admitted for further management. No c/o n/vom/sob after surgery. No Interscalene block was placed due to COPD with O2 dependence at night. )wy    Allergies:  No Known Allergies    Meds:  Medications Prior to Admission   Medication Sig Dispense Refill Last Dose   • acetaminophen (TYLENOL) 500 MG tablet Take 500 mg by mouth Every 6 (Six) Hours As Needed for Mild Pain .   Past Week at Unknown time   • budesonide-formoterol (SYMBICORT) 160-4.5 MCG/ACT inhaler Inhale 2 puffs 2 (Two) Times a Day.   11/12/2018 at 0600   • clonazePAM (KlonoPIN) 0.5 MG tablet Take 0.5 mg by mouth Daily As Needed.   11/12/2018 at 0700   • clonazePAM (KlonoPIN) 1 MG tablet Take 1 mg by mouth Every Night.   11/11/2018 at 2100   • ferrous sulfate 325 (65 FE) MG tablet Take 325 mg by mouth 2 (Two) Times a Day.   11/11/2018 at Unknown time   • flecainide (TAMBOCOR) 50 MG tablet Take 1 tablet by mouth Every 12 (Twelve) Hours. 60 tablet 11 11/12/2018 at 0700   • FLUoxetine (PROzac) 20 MG capsule Take 20 mg by mouth 2 (two) times a day.   11/12/2018 at 0700   • metaxalone (SKELAXIN) 800 MG tablet Take 1 tablet by mouth Every 6 (Six) Hours As Needed for Muscle Spasms. 12 tablet 0 Past Week at Unknown time   • O2 (OXYGEN) Inhale 2 L/min Every Night.   11/11/2018 at Unknown time   • omeprazole (priLOSEC) 20 MG capsule Take 20 mg by mouth 2 (Two) Times a Day.   11/12/2018 at 0700   • apixaban (ELIQUIS) 5 MG tablet tablet Take 1 tablet by mouth Every 12 (Twelve) Hours. 60 tablet 5 11/9/2018 at 0800   • bumetanide (BUMEX) 2 MG tablet Take 1 tablet by mouth Daily. 30 tablet 11 11/10/2018   • HYDROcodone-acetaminophen (NORCO) 7.5-325 MG per tablet Take 1 tablet by mouth Every 6 (Six) Hours As Needed (pain). (Patient taking differently: Take 1 tablet by mouth 2 (Two) Times a Day.) 12 tablet 0 Taking   • loratadine (CLARITIN) 10 MG tablet Take 10 mg by mouth Daily.   More than  a month at Unknown time   • Omega-3 Fatty Acids (FISH OIL) 1200 MG capsule capsule Take 2,400 mg by mouth Every Evening.   11/10/2018 at 2100   • potassium chloride (K-DUR) 10 MEQ CR tablet Take 1 tablet by mouth Daily. 30 tablet 11 11/10/2018   • Thyroid 60 MG PO tablet Take 60 mg by mouth Every Morning.   11/10/2018       History:   Past Medical History:   Diagnosis Date   • Anxiety    • Arthritis    • Asthma    • Atrial fibrillation (CMS/HCC)    • Coronary artery disease    • Depression    • Disease of thyroid gland    • DVT (deep venous thrombosis) (CMS/HCC)    • Eczema    • GERD (gastroesophageal reflux disease)    • HLD (hyperlipidemia) 2018   • On home oxygen therapy     2L at night   • Wears dentures    • Wears glasses      Past Surgical History:   Procedure Laterality Date   • APPENDECTOMY     • CHOLECYSTECTOMY     • COLONOSCOPY     • COLONOSCOPY W/ BIOPSIES     • EYE SURGERY      cataracts   • TUBAL ABDOMINAL LIGATION       Family History   Problem Relation Age of Onset   • Cancer Father    • No Known Problems Mother      Social History     Tobacco Use   • Smoking status: Former Smoker     Packs/day: 1.00     Years: 0.00     Pack years: 0.00     Types: Cigarettes     Start date: 1961     Last attempt to quit: 1986     Years since quittin.2   • Smokeless tobacco: Never Used   Substance Use Topics   • Alcohol use: No   • Drug use: No   She lives alone, but family lives close by. She has 4 children. She is retired housewife.    Review of Systems  All systems were reviewed and negative except for:  Respiratory: positive for  shortness of air    Vital Signs  Visit Vitals  /61 (BP Location: Right arm, Patient Position: Lying)   Pulse 61   Temp 98.3 °F (36.8 °C) (Tympanic)   Resp 18   SpO2 94%       Physical Exam:    General Appearance:    Alert, cooperative, in no acute distress( drows when I saw posotp.)wy   Head:    Normocephalic, without obvious abnormality, atraumatic   Eyes:             Lids and lashes normal, conjunctivae and sclerae normal, no   icterus, no pallor, corneas clear, PERRLA   Ears:    Ears appear intact with no abnormalities noted   Neck:   No adenopathy, supple, trachea midline, no thyromegaly, no     carotid bruit, no JVD   Lungs:     Clear to auscultation,respirations regular, even and                   unlabored    Heart:    Regular rhythm and normal rate, normal S1 and S2, no            murmur, no gallop, no rub, no click   Abdomen:     Normal bowel sounds, no masses, no organomegaly, soft        non-tender, non-distended, no guarding, no rebound                 tenderness   Genitalia:    Deferred   Extremities:   Moves all extremities well, no edema, no cyanosis, no              Redness  ( Postop: Right shoulder with CDI dressing. Distal pulses, cap refill, intact. Hand is warm. Sling is on RUE)wy   Pulses:   Pulses palpable and equal bilaterally   Skin:   No bleeding, bruising or rash   Neurologic:   Cranial nerves 2 - 12 grossly intact, sensation intact      I reviewed the patient's new clinical results.       Results from last 7 days   Lab Units  11/11/18   1534   WBC 10*3/mm3  6.73   HEMOGLOBIN g/dL  10.8*   HEMATOCRIT %  34.6   PLATELETS 10*3/mm3  254     Results from last 7 days   Lab Units  11/11/18   1606   POTASSIUM mmol/L  3.6     Lab Results   Component Value Date    HGBA1C 5.20 11/11/2018       Assessment and Plan:     Right shoulder pain    CHF (congestive heart failure) (CMS/HCC)    Atrial fibrillation (CMS/HCC)    HTN (hypertension)    HLD (hyperlipidemia)      Plan  1. PT/OT- RUE sling, pendulum exercises  2. Pain control-prns  3. IS-encourage  4. DVT proph- mechs/ASA  5. Bowel regimen  6. Resume home medications as appropriate  7. Monitor post-op labs  8. DC planning for home with families help    HTN, HLD, afib, CHF  - Continue home tambocor, bumex  - Resume Eliquis when ok with Dr. Silveira  - Monitor BP   - Holding parameters for BP meds  - Labetalol  PRN for SBP>170      Raissa ALYSA Joseph  11/12/18  2:42 PM     I have personally performed the evaluation on this patient. My history is consistent  with HPI obtained. My exam findings are listed above. I have personally reviewed and discussed the above formulated treatment plan with family and .APRN.wy      Electronically signed by Siena Bradshaw MD at 11/13/2018 11:54 AM     Nemo Wayne APRN at 11/12/2018 12:07 PM     Attestation signed by Robbie Silveira MD at 11/12/2018  1:31 PM    Patient seen and examined.  Agree with above.  Consent reviewed.                  Pre-Op H&P  Cari Harper  [id number]  1940    Chief complaint: Right shoulder pain    HPI:    Patient is a 78 y.o.female who presents with post traumatic osteoarthritis, right shoulder and here today for right reverse total shoulder replacement.     Review of Systems:  General ROS: negative for chills, fever or skin lesions;  No changes since last office visit  Cardiovascular ROS: no chest pain or dyspnea on exertion.  +cardiac clearance  Respiratory ROS: no cough, shortness of breath, or wheezing    Allergies: No Known Allergies    Home Meds:    No current facility-administered medications on file prior to encounter.      Current Outpatient Medications on File Prior to Encounter   Medication Sig Dispense Refill   • acetaminophen (TYLENOL) 500 MG tablet Take 500 mg by mouth Every 6 (Six) Hours As Needed for Mild Pain .     • clonazePAM (KlonoPIN) 0.5 MG tablet Take 0.5 mg by mouth Daily As Needed.     • clonazePAM (KlonoPIN) 1 MG tablet Take 1 mg by mouth Every Night.     • ferrous sulfate 325 (65 FE) MG tablet Take 325 mg by mouth 2 (Two) Times a Day.     • flecainide (TAMBOCOR) 50 MG tablet Take 1 tablet by mouth Every 12 (Twelve) Hours. 60 tablet 11   • FLUoxetine (PROzac) 20 MG capsule Take 20 mg by mouth 2 (two) times a day.     • metaxalone (SKELAXIN) 800 MG tablet Take 1 tablet by mouth Every 6 (Six) Hours As Needed for  Muscle Spasms. 12 tablet 0   • apixaban (ELIQUIS) 5 MG tablet tablet Take 1 tablet by mouth Every 12 (Twelve) Hours. 60 tablet 5   • bumetanide (BUMEX) 2 MG tablet Take 1 tablet by mouth Daily. 30 tablet 11   • HYDROcodone-acetaminophen (NORCO) 7.5-325 MG per tablet Take 1 tablet by mouth Every 6 (Six) Hours As Needed (pain). (Patient taking differently: Take 1 tablet by mouth 2 (Two) Times a Day.) 12 tablet 0   • loratadine (CLARITIN) 10 MG tablet Take 10 mg by mouth Daily.     • Omega-3 Fatty Acids (FISH OIL) 1200 MG capsule capsule Take 2,400 mg by mouth Every Evening.     • potassium chloride (K-DUR) 10 MEQ CR tablet Take 1 tablet by mouth Daily. 30 tablet 11   • Thyroid 60 MG PO tablet Take 60 mg by mouth Every Morning.         PMH:   Past Medical History:   Diagnosis Date   • Anxiety    • Arthritis    • Asthma    • Atrial fibrillation (CMS/HCC)    • Coronary artery disease    • Depression    • Disease of thyroid gland    • DVT (deep venous thrombosis) (CMS/HCC)    • Eczema    • GERD (gastroesophageal reflux disease)    • On home oxygen therapy     2L at night   • Wears dentures    • Wears glasses      PSH:    Past Surgical History:   Procedure Laterality Date   • APPENDECTOMY     • CHOLECYSTECTOMY     • COLONOSCOPY     • COLONOSCOPY W/ BIOPSIES     • EYE SURGERY      cataracts   • TUBAL ABDOMINAL LIGATION       Social History:   Tobacco:   Social History     Tobacco Use   Smoking Status Former Smoker   • Packs/day: 1.00   • Years: 0.00   • Pack years: 0.00   • Types: Cigarettes   • Start date: 1961   • Last attempt to quit: 1986   • Years since quittin.2   Smokeless Tobacco Never Used      Alcohol:     Social History     Substance and Sexual Activity   Alcohol Use No       Vitals:           /61 (BP Location: Right arm, Patient Position: Lying)   Pulse 61   Temp 98.3 °F (36.8 °C) (Tympanic)   Resp 18   SpO2 94%     Physical Exam:  General Appearance:    Alert, cooperative, no distress,  appears stated age   Head:    Normocephalic, without obvious abnormality, atraumatic   Lungs:     Clear to auscultation bilaterally, respirations unlabored    Heart:   Regular rate and rhythm, S1 and S2 normal, no murmur, rub    or gallop    Abdomen:    Soft, non-tender.  +bowel sounds   Breast Exam:    deferred   Genitalia:    deferred   Extremities:   Extremities normal, atraumatic, no cyanosis or edema   Skin:   Skin color, texture, turgor normal, no rashes or lesions   Neurologic:   Grossly intact   Results Review  I reviewed the patient's new clinical results.    Cancer Staging (if applicable)  Cancer Patient: __ yes _x_no __unknown; If yes, clinical stage T:__ N:__M:__, stage group or __N/A    Impression: Post traumatic osteoarthritis, right shoulder    Plan: Right reverse total shoulder replacement    Nemo Wayne, APRN   11/12/2018   12:07 PM    Electronically signed by Robbie Silveira MD at 11/12/2018  1:31 PM          Operative/Procedure Notes (most recent note)      Robbie Silveira MD at 11/12/2018  1:45 PM        Date; November 12, 2018    Preoperative diagnosis; right shoulder rotator cuff arthropathy    Postoperative diagnosis; same with severe synovitis and multiple loose bodies    Procedure; right reverse total shoulder arthroplasty with extensive synovectomy    Surgeon; Robbie Silveira M.D.    Assistant; Rosette Contreras physician assistant medically required    Anesthesia; general with endotracheal airway and 30 cc of local anesthetic, cortical percent plain Marcaine, to the jodee-incisional tissues.  No interscalene block was utilized due to her significant pulmonary risk factors    Complications; none noted    Estimated blood loss; 200 mL    Implants; Arthrex    Indications; 78-year-old female with significantly advanced rotator cuff arthropathy.  Pain and functional weakness.  Failed conservative treatment.  Cleared medically for surgery.  Attempted to control all perioperative risk factors.   Noted the risks of surgery to the shoulder and cardiopulmonary status.  Informed consent was obtained.  Aware of the risks, benefits and alternatives.    Procedure performed; patient was identified and marked in the holding area.  Consent confirmed.  After the induction of anesthesia the patient was carefully padded and positioned in the 45° modified beachchair position.  Cervical spine stabilized.  Meticulous sterile prep and drape.  Surgical timeout.  Anatomic landmarks marked.  We utilized togas, Ioban and pneumatic arm aranda.  Deltopectoral approach.  Meticulous hemostasis.  Blunt release of the subacromial, subdeltoid and subcoracoid spaces.  Deep retractors were placed.  There was a significant the pseudocapsule with a large effusion.  As it was opened significant synovitis was encountered with multiple loose bodies.  A extensive synovectomy was performed.  We released the inferior capsule.  Palpated and protected the axillary nerve with a Hohmann retractor.  Standard humeral head cut at 20° retroversion was made.  Shallow resection.  We moved to the glenoid.  Was apprised that the significant tightness.  I made aggressive release of the inferior capsule.  Superior inclination of the glenoid was appreciated.  We had good circumferential exposure and picked out of good Center point at the junction of the middle and inferior third.  We drilled for our pin making sure not to achieve superior inclination.  Neutral version.  We then completed our superficial and peripheral reaming.  Drilled and measured for our 65 screw.  Baseplate was constructed on the back table.  Was inserted by hand with excellent fist tightening.  3 peripheral locking screws were placed at the 12:00, 6:00 and 9:00 positions.  A 36+4 glenoid sphere was chosen.  She had a very small glenoid.  It was inserted, handle and the setscrew was engaged and locked.  Was carefully checked.  There was no overhanging.  We moved humerus.  We reamed and  broached up to a size 6 stem.  Good axial and rotational stability.  Again very small diameter.  20° version.  Trialing arrived at a 3 mm polyethylene liner.  There was good soft tissue tension, impingement free range of motion movement and no undue excess tension.  After final irrigation and impacted the humeral components.  The final reduction was satisfactory.  We left the needle in the joint for tobramycin injection.  Closed in layers.  We placed a superficial anesthetic in the superficial soft tissues.  Absorbable subcuticular suture and glue.  Dressing and sling were applied.  Anesthesia reversed.    Stable to the PACU    Electronically signed by Robbie Silveira MD at 11/12/2018  3:28 PM       Physician Progress Notes (most recent note)     No notes of this type exist for this encounter.        Consult Notes (most recent note)     No notes of this type exist for this encounter.        Physical Therapy Notes (most recent note)     No notes of this type exist for this encounter.        Discharge Summary     No notes of this type exist for this encounter.

## 2018-11-13 NOTE — PLAN OF CARE
Problem: Patient Care Overview  Goal: Plan of Care Review  Outcome: Ongoing (interventions implemented as appropriate)   11/13/18 0600   Coping/Psychosocial   Plan of Care Reviewed With patient   Plan of Care Review   Progress improving   OTHER   Outcome Summary Pt had uneventful night, slept between [facility] care. Confused at times upon awakening but easily reoriented. RUE immobilization sling in place, edema and bruising noted to shoulder/upper arm. Pt denied need for pain med when offered. Pt has difficulty emptying bladder completely, straight cath x 1. Pt calm, cooperative. VSS       Problem: Skin Injury Risk (Adult)  Goal: Identify Related Risk Factors and Signs and Symptoms  Outcome: Ongoing (interventions implemented as appropriate)      Problem: Fall Risk (Adult)  Goal: Identify Related Risk Factors and Signs and Symptoms  Outcome: Ongoing (interventions implemented as appropriate)

## 2018-11-13 NOTE — THERAPY EVALUATION
Acute Care - Physical Therapy Initial Evaluation  [facility]     Patient Name: Cari Harper  : 1940  MRN: [id number]  Today's Date: 2018   Onset of Illness/Injury or Date of Surgery: 18  Date of Referral to PT: 18  Referring Physician: JOSE DANIEL Silveira MD      Admit Date: 2018    Visit Dx:     ICD-10-CM ICD-9-CM   1. Impaired mobility and ADLs Z74.09 799.89   2. Impaired functional mobility, balance, gait, and endurance Z74.09 V49.89     Patient Active Problem List   Diagnosis   • Diarrhea   • Respiratory failure, acute (CMS/HCC)   • Viral pneumonia   • Iron deficiency anemia   • CHF (congestive heart failure) (CMS/HCC)   • Atrial fibrillation (CMS/HCC)   • HTN (hypertension)   • HLD (hyperlipidemia)   • Right shoulder pain   • Shoulder arthritis     Past Medical History:   Diagnosis Date   • Anxiety    • Arthritis    • Asthma    • Atrial fibrillation (CMS/HCC)    • Coronary artery disease    • Depression    • Disease of thyroid gland    • DVT (deep venous thrombosis) (CMS/HCC)    • Eczema    • GERD (gastroesophageal reflux disease)    • HLD (hyperlipidemia) 2018   • On home oxygen therapy     2L at night   • Wears dentures    • Wears glasses      Past Surgical History:   Procedure Laterality Date   • APPENDECTOMY     • CHOLECYSTECTOMY     • COLONOSCOPY     • COLONOSCOPY W/ BIOPSIES     • EYE SURGERY      cataracts   • TUBAL ABDOMINAL LIGATION          PT ASSESSMENT (last 12 hours)      Physical Therapy Evaluation     Row Name 18 1115          PT Evaluation Time/Intention    Subjective Information  no complaints  -MB     Document Type  evaluation  -MB     Mode of Treatment  physical therapy;individual therapy  -MB     Patient Effort  good  -MB     Symptoms Noted During/After Treatment  none  -MB     Row Name 18 1111          General Information    Patient Profile Reviewed?  yes  -MB     Onset of Illness/Injury or Date of Surgery  18  -MB     Referring Physician   JOSE DANIEL Silveira MD  -MB     Patient Observations  alert;cooperative;agree to therapy  -MB     Patient/Family Observations  Pt.'s sons present for PT.  -MB     General Observations of Patient  Pt. sitting on EOB w/ son present.  -MB     Prior Level of Function  independent:;all household mobility;gait;transfer;bed mobility;mod assist:;ADL's;home management  -MB     Equipment Currently Used at Home  raised toilet;shower chair  -MB     Pertinent History of Current Functional Problem  Pt. is a 77 yo female who presented for surgical management of R shoulder pain and dysfunction.  Pt. is POD #1 s/p reverse R TSA w/ extensive synovectomy.    -MB     Existing Precautions/Restrictions  fall;non-weight bearing;shoulder  (Significant)   -MB     Limitations/Impairments  safety/cognitive  -MB     Risks Reviewed  patient and family:;LOB;nausea/vomiting;dizziness;increased discomfort;change in vital signs  -MB     Benefits Reviewed  patient and family:;improve function;increase independence;increase strength;increase balance;decrease pain;decrease risk of DVT;improve skin integrity;increase knowledge  -MB     Barriers to Rehab  previous functional deficit;medically complex  -MB     Row Name 11/13/18 1115          Relationship/Environment    Primary Source of Support/Comfort  child(rudy)  -MB     Lives With  alone  -MB     Row Name 11/13/18 1115          Resource/Environmental Concerns    Current Living Arrangements  home/apartment/condo  -MB     Row Name 11/13/18 1115          Cognitive Assessment/Intervention- PT/OT    Orientation Status (Cognition)  oriented x 4  -MB     Follows Commands (Cognition)  follows one step commands;over 90% accuracy;repetition of directions required;verbal cues/prompting required  -MB     Cognitive Function (Cognitive)  safety deficit  -MB     Safety Deficit (Cognitive)  mild deficit;ability to follow commands;awareness of need for assistance;insight into deficits/self awareness;safety precautions  awareness  -MB     Personal Safety Interventions  fall prevention program maintained;gait belt;muscle strengthening facilitated;nonskid shoes/slippers when out of bed  -MB     Row Name 11/13/18 1115          Bed Mobility Assessment/Treatment    Comment (Bed Mobility)  Pt. received sitting EOB, left sitting UIC.  -MB     Row Name 11/13/18 1115          Transfer Assessment/Treatment    Transfer Assessment/Treatment  sit-stand transfer;stand-sit transfer;toilet transfer  -MB     Comment (Transfers)  VCs to push through L UE to stand.  -MB     Sit-Stand [place] (Transfers)  contact guard;verbal cues  -MB     Stand-Sit [place] (Transfers)  contact guard;verbal cues  -MB     [place] Level (Toilet Transfer)  contact guard;verbal cues  -MB     Assistive Device (Toilet Transfer)  commode, bedside without drop arms;cane, straight  -MB     Row Name 11/13/18 1115          Sit-Stand Transfer    Assistive Device (Sit-Stand Transfers)  cane, straight  -[facility] Name 11/13/18 1115          Stand-Sit Transfer    Assistive Device (Stand-Sit Transfers)  cane, straight  -[facility] Name 11/13/18 1115          Toilet Transfer    Type (Toilet Transfer)  sit-stand;stand-sit  -[facility] Name 11/13/18 1115          Gait/Stairs Assessment/Training    [place] Level (Gait)  contact guard;verbal cues  -MB     Assistive Device (Gait)  cane, straight  -MB     Distance in Feet (Gait)  60  -MB     Pattern (Gait)  step-through  -MB     Deviations/Abnormal Patterns (Gait)  mohini decreased;gait speed decreased;stride length decreased  -MB     Bilateral Gait Deviations  forward flexed posture;heel strike decreased  -MB     Comment (Gait/Stairs)  Pt. demo step through gait pattern at slow pace w/ increased forward flexion.  Pt. required VCs for upright posture, safety during turns / direction changes, and increased B heel strike.  Pt. c/o increased fatigue and requested to return to room.  VSS.  -MB     Row Name 11/13/18 1115           General ROM    GENERAL ROM COMMENTS  BLEs WFL for age.  -MB     Row Name 11/13/18 1115          MMT (Manual Muscle Testing)    General MMT Comments  BLEs grossly 4-/5.  -MB     Row Name 11/13/18 1115          Balance    Balance  static standing balance;dynamic standing balance  -MB     Row Name 11/13/18 1115          Static Standing Balance    Level of [place] (Supported Standing, Static Balance)  contact guard assist  -MB     Time Able to Maintain Position (Supported Standing, Static Balance)  1 to 2 minutes  -MB     Assistive Device Utilized (Supported Standing, Static Balance)  straight cane  -MB     Row Name 11/13/18 1115          Dynamic Standing Balance    Level of [place], Reaches Outside Midline (Standing, Dynamic Balance)  contact guard assist  -MB     Time Able to Maintain Position, Reaches Outside Midline (Standing, Dynamic Balance)  1 to 2 minutes  -MB     Comment, Reaches Outside Midline (Standing, Dynamic Balance)  post toilet hygiene and clothes management  -MB     Row Name 11/13/18 1115          Sensory Assessment/Intervention    Sensory General Assessment  no sensation deficits identified  -MB     Row Name 11/13/18 1115          Pain Scale: Numbers Pre/Post-Treatment    Pain Scale: Numbers, Pretreatment  0/10 - no pain  -MB     Pain Scale: Numbers, Post-Treatment  0/10 - no pain  -MB     Row Name             Wound 11/12/18 1240 Right shoulder incision    Wound - Properties Group Date first assessed: 11/12/18  -JA Time first assessed: 1240  -JA Side: Right  -JA Location: shoulder  -JA Type: incision  -JA    Row Name 11/13/18 1115          Plan of Care Review    Plan of Care Reviewed With  patient;son  -[facility] Name 11/13/18 1115          Physical Therapy Clinical Impression    Date of Referral to PT  11/12/18  -MB     PT Diagnosis (PT Clinical Impression)  s/p TSA  -MB     Functional Level at Time of Evaluation (PT Clinical Impression)  Pt. requires assist for safe mobility.  -MB      Patient/Family Goals Statement (PT Clinical Impression)  Return to home and PLOF.  -MB     Criteria for Skilled Interventions Met (PT Clinical Impression)  yes;treatment indicated  -MB     Rehab Potential (PT Clinical Summary)  good, to achieve stated therapy goals  -MB     Care Plan Review (PT)  evaluation/treatment results reviewed;care plan/treatment goals reviewed;risks/benefits reviewed;current/potential barriers reviewed;patient/other agree to care plan  -MB     Row Name 11/13/18 1115          Vital Signs    Pre Systolic BP Rehab  -- VSS.  RN cleared for PT.  -MB     Pre Patient Position  Sitting  -MB     Intra Patient Position  Standing  -MB     Post Patient Position  Sitting  -MB     Row Name 11/13/18 1115          Physical Therapy Goals    Bed Mobility Goal Selection (PT)  bed mobility, PT goal 1  -MB     Transfer Goal Selection (PT)  transfer, PT goal 1  -MB     Gait Training Goal Selection (PT)  gait training, PT goal 1  -MB     Row Name 11/13/18 1115          Bed Mobility Goal 1 (PT)    Activity/Assistive Device (Bed Mobility Goal 1, PT)  bed mobility activities, all  -MB     [place] Level/Cues Needed (Bed Mobility Goal 1, PT)  independent  -MB     Time Frame (Bed Mobility Goal 1, PT)  5 days  -MB     Progress/Outcomes (Bed Mobility Goal 1, PT)  goal ongoing  -MB     Row Name 11/13/18 1115          Transfer Goal 1 (PT)    Activity/Assistive Device (Transfer Goal 1, PT)  sit-to-stand/stand-to-sit;bed-to-chair/chair-to-bed;cane, straight  -MB     [place] Level/Cues Needed (Transfer Goal 1, PT)  supervision required  -MB     Time Frame (Transfer Goal 1, PT)  5 days  -MB     Progress/Outcome (Transfer Goal 1, PT)  goal ongoing  -MB     Row Name 11/13/18 1115          Gait Training Goal 1 (PT)    Activity/Assistive Device (Gait Training Goal 1, PT)  gait (walking locomotion);cane, straight  -MB     [place] Level (Gait Training Goal 1, PT)  supervision required  -MB     Distance (Gait Goal 1,  PT)  75  -MB     Time Frame (Gait Training Goal 1, PT)  5 days  -MB     Progress/Outcome (Gait Training Goal 1, PT)  goal ongoing  -MB     Row Name 11/13/18 1115          Positioning and Restraints    Pre-Treatment Position  in bed  -MB     Post Treatment Position  chair  -MB     In Chair  notified nsg;reclined;call light within reach;encouraged to call for assist;exit alarm on;with family/caregiver;legs elevated;RUE elevated  -MB     Row Name 11/13/18 1115          Living Environment    Home Accessibility  tub/shower is not walk in  -MB       User Key  (r) = Recorded By, (t) = Taken By, (c) = Cosigned By    Initials Name Provider Type    Farshad Garg, RN Registered Nurse    Autumn Grossman, PT Physical Therapist          PT Recommendation and Plan  Anticipated Discharge Disposition (PT): home with assist  Planned Therapy Interventions (PT Eval): balance training, bed mobility training, gait training, home exercise program, patient/family education, strengthening, transfer training  Therapy Frequency (PT Clinical Impression): daily  Outcome Summary/Treatment Plan (PT)  Anticipated Equipment Needs at Discharge (PT): standard cane  Anticipated Discharge Disposition (PT): home with assist  Plan of Care Reviewed With: patient  Progress: improving  Outcome Summary: PT eval completed.  Pt. s/p R TSA who presents w/ mild balance deficits and gait instability (family reports is baseline).  Pt. performed sit to stand transfers from EOB and BSC w/ CGA and straight cane.  Pt. ambulated 60 ft w/ SPC, CGA, and VCs for safety w/ increased c/o fatigue.  Recommended use of straight cane to pt/ sons for increased safety/dec fall risk w/ ambulation and improved activity tolerance.  Pt. declined, stating she did not need.  Pt. will benefit from skilled IPPT to address impairments and maximize patient's safety and ind. w/ mobility.  Recommend home w/ assist at D/C.  Outcome Measures     Row Name 11/13/18 1115 11/13/18  0916          How much help from another person do you currently need...    Turning from your back to your side while in flat bed without using bedrails?  3  -MB  --     Moving from lying on back to sitting on the side of a flat bed without bedrails?  3  -MB  --     Moving to and from a bed to a chair (including a wheelchair)?  3  -MB  --     Standing up from a chair using your arms (e.g., wheelchair, bedside chair)?  3  -MB  --     Climbing 3-5 steps with a railing?  2  -MB  --     To walk in hospital room?  3  -MB  --     AM-PAC 6 Clicks Score  17  -MB  --        How much help from another is currently needed...    Putting on and taking off regular lower body clothing?  --  1  -ST     Bathing (including washing, rinsing, and drying)  --  2  -ST     Toileting (which includes using toilet bed pan or urinal)  --  2  -ST     Putting on and taking off regular upper body clothing  --  1  -ST     Taking care of personal grooming (such as brushing teeth)  --  2  -ST     Eating meals  --  3  -ST     Score  --  11  -ST        Functional Assessment    Outcome Measure Options  AM-PAC 6 Clicks Basic Mobility (PT)  -MB  AM-PAC 6 Clicks Daily Activity (OT)  -ST       User Key  (r) = Recorded By, (t) = Taken By, (c) = Cosigned By    Initials Name Provider Type     Lacey Croft, OTR Occupational Therapist    Autumn Grossman, PT Physical Therapist         Time Calculation:   PT Charges     Row Name 11/13/18 1553             Time Calculation    Start Time  1115  -MB      PT Received On  11/13/18  -MB      PT Goal Re-Cert Due Date  11/23/18  -MB        User Key  (r) = Recorded By, (t) = Taken By, (c) = Cosigned By    Initials Name Provider Type    Autumn Grossman, PT Physical Therapist        Therapy Suggested Charges     Code   Minutes Charges    None           Therapy Charges for Today     Code Description Service Date Service Provider Modifiers Qty    65931771517 HC PT MOBILITY CURRENT 11/13/2018 Carolin  Autumn WOODARD, PT GP, CK 1    24591291729 HC PT MOBILITY PROJECTED 11/13/2018 Autumn Coe, PT GP, CJ 1    [id number] HC PT EVAL MOD COMPLEXITY 4 11/13/2018 Autumn Coe, PT GP 1          PT G-Codes  Outcome Measure Options: AM-PAC 6 Clicks Basic Mobility (PT)  AM-PAC 6 Clicks Score: 17  Score: 11  Functional Limitation: Mobility: Walking and moving around  Mobility: Walking and Moving Around Current Status (): At least 40 percent but less than 60 percent impaired, limited or restricted  Mobility: Walking and Moving Around Goal Status (): At least 20 percent but less than 40 percent impaired, limited or restricted      Autumn Coe, PT  11/13/2018

## 2018-11-13 NOTE — PLAN OF CARE
Problem: Patient Care Overview  Goal: Plan of Care Review  Outcome: Ongoing (interventions implemented as appropriate)   11/13/18 1023   Coping/Psychosocial   Plan of Care Reviewed With patient   Plan of Care Review   Progress improving   OTHER   Outcome Summary PT migdalia completed. Pt. s/p R TSA who presents w/ mild balance deficits and gait instability (family reports is baseline). Pt. performed sit to stand transfers from EOB and BSC w/ CGA and straight cane. Pt. ambulated 60 ft w/ SPC, CGA, and VCs for safety w/ increased c/o fatigue. Recommended use of straight cane to pt/ sons for increased safety/dec fall risk w/ ambulation and improved activity tolerance. Pt. declined, stating she did not need. Pt. will benefit from skilled IPPT to address impairments and maximize patient's safety and ind. w/ mobility. Recommend home w/ assist at D/C.

## 2018-11-13 NOTE — THERAPY DISCHARGE NOTE
Acute Care - Occupational Therapy Initial Eval/Discharge  [facility]     Patient Name: Cari Harper  : 1940  MRN: [id number]  Today's Date: 2018  Onset of Illness/Injury or Date of Surgery: 18  Date of Referral to OT: 18  Referring Physician: MD Jordaan      Admit Date: 2018       ICD-10-CM ICD-9-CM   1. Impaired mobility and ADLs Z74.09 799.89     Patient Active Problem List   Diagnosis   • Diarrhea   • Respiratory failure, acute (CMS/HCC)   • Viral pneumonia   • Iron deficiency anemia   • CHF (congestive heart failure) (CMS/HCC)   • Atrial fibrillation (CMS/HCC)   • HTN (hypertension)   • HLD (hyperlipidemia)   • Right shoulder pain   • Shoulder arthritis     Past Medical History:   Diagnosis Date   • Anxiety    • Arthritis    • Asthma    • Atrial fibrillation (CMS/HCC)    • Coronary artery disease    • Depression    • Disease of thyroid gland    • DVT (deep venous thrombosis) (CMS/HCC)    • Eczema    • GERD (gastroesophageal reflux disease)    • HLD (hyperlipidemia) 2018   • On home oxygen therapy     2L at night   • Wears dentures    • Wears glasses      Past Surgical History:   Procedure Laterality Date   • APPENDECTOMY     • CHOLECYSTECTOMY     • COLONOSCOPY     • COLONOSCOPY W/ BIOPSIES     • EYE SURGERY      cataracts   • TUBAL ABDOMINAL LIGATION            OT ASSESSMENT FLOWSHEET (last 72 hours)      Occupational Therapy Evaluation     Row Name 18 0916                   OT Evaluation Time/Intention    Subjective Information  no complaints  -ST        Document Type  evaluation;therapy note (daily note);discharge evaluation/summary  -ST        Mode of Treatment  individual therapy;occupational therapy  -ST        Patient Effort  good  -ST        Symptoms Noted During/After Treatment  none  -ST           General Information    Patient Profile Reviewed?  yes  -ST        Onset of Illness/Injury or Date of Surgery  18  -ST        Referring Physician  Jordana  MD  -ST        Patient Observations  alert;cooperative;agree to therapy  -ST        Patient/Family Observations  family present throughout session for teaching/education  -ST        General Observations of Patient  Pt supine upon arrival; sling intact  -ST        Prior Level of Function  independent:;all household mobility;transfer;bed mobility;grooming;feeding;mod assist:;dressing;bathing;home management;cooking;cleaning baseline difficulty w/ADLs and balance  -ST        Equipment Currently Used at Home  raised toilet;shower chair  -ST        Pertinent History of Current Functional Problem  Pt presents for sx management of long-standing R shoulder pain and dysfunction impacting ability to perform ADL and IADL tasks. Pt with R rotator cuff arthropathy and severe synovitis and mult. loose bodies. Pt is now POD 1 and s/p reverse R TSA with extensive synovectomy  -ST        Existing Precautions/Restrictions  fall;non-weight bearing;shoulder  (Significant)  ROM/shoulder precautions, impulsivity, sling use  -ST        Limitations/Impairments  safety/cognitive  -ST        Risks Reviewed  patient and family:;LOB;nausea/vomiting;dizziness;increased discomfort;change in vital signs;increased drainage  -ST        Benefits Reviewed  patient and family:;improve function;increase independence;increase strength;increase balance;decrease pain;increase knowledge  -ST        Barriers to Rehab  medically complex;previous functional deficit  -ST           Relationship/Environment    Primary Source of Support/Comfort  child(rudy)  -ST        Concerns About Impact on Relationships  pt has 24/7 assist both day and night b/t daughters and sons  -ST           Resource/Environmental Concerns    Current Living Arrangements  home/apartment/condo  -ST           Cognitive Assessment/Interventions    Additional Documentation  Cognitive Assessment/Intervention (Group)  -ST           Cognitive Assessment/Intervention- PT/OT    Affect/Mental Status  (Cognitive)  WNL  -ST        Orientation Status (Cognition)  oriented x 4  -ST        Follows Commands (Cognition)  over 90% accuracy;repetition of directions required  -ST        Cognitive Function (Cognitive)  safety deficit  -ST        Safety Deficit (Cognitive)  mild deficit  -ST        Personal Safety Interventions  fall prevention program maintained;gait belt;nonskid shoes/slippers when out of bed  -ST        Cognitive Assessment/Intervention Comment  additional cuing for maintaining shoulder safety protocol   -ST           Safety Issues, Functional Mobility    Safety Issues Affecting Function (Mobility)  ability to follow commands;impulsivity  -ST        Impairments Affecting Function (Mobility)  balance  -ST           Mobility Assessment/Treatment    Extremity Weight-bearing Status  right upper extremity  -ST        Right Upper Extremity (Weight-bearing Status)  non weight-bearing (NWB)  (Significant)   -ST           Bed Mobility Assessment/Treatment    Bed Mobility Assessment/Treatment  supine-sit  -ST        Supine-Sit [place] (Bed Mobility)  minimum assist (75% patient effort)  -ST        Bed Mobility, Safety Issues  decreased use of arms for pushing/pulling;decreased use of legs for bridging/pushing;impaired trunk control for bed mobility  -ST        Assistive Device (Bed Mobility)  head of bed elevated  -ST        Comment (Bed Mobility)  has lift chair and recliner chair; either of which she plans to sleep in at home (does not sleep in bed at home)  -ST           Functional Mobility    Functional Mobility- Ind. Level  contact guard assist  -ST        Functional Mobility- Device  other (see comments) UE support  -ST        Functional Mobility-Distance (Feet)  5  -ST        Functional Mobility- Safety Issues  loses balance backward  -ST        Functional Mobility- Comment  steps to BSC, back to EOB  -ST           Transfer Assessment/Treatment    Transfer Assessment/Treatment  sit-stand  transfer;stand-sit transfer;toilet transfer  -ST        Maintains Weight-bearing Status (Transfers)  verbal cues to maintain  -ST        Comment (Transfers)  cuing to avoid using sx UE for safety   -ST           Sit-Stand Transfer    Sit-Stand [place] (Transfers)  contact guard  -ST        Assistive Device (Sit-Stand Transfers)  -- UE support  -ST           Stand-Sit Transfer    Stand-Sit [place] (Transfers)  contact guard  -ST           Toilet Transfer    Type (Toilet Transfer)  sit-stand;stand-sit  -ST        [place] Level (Toilet Transfer)  verbal cues;1 person assist  -ST        Assistive Device (Toilet Transfer)  commode, bedside without drop arms  -ST           ADL Assessment/Intervention    62158 - OT Self Care/Mgmt Minutes  31  -ST        BADL Assessment/Intervention  bathing;upper body dressing;lower body dressing;clothing fastener management;toileting  -ST           Bathing Assessment/Intervention    Comment (Bathing)  educated on axillary care and bathing techniques in order to maintain shoulder precautions; reviewed w/family  -ST           Upper Body Dressing Assessment/Training    Upper Body Dressing [place] Level  doff;don;front opening garment;other (see comments) sling  -ST        Comment (Upper Body Dressing)  pt is u/a to assist with UBD d/t ROM deficits and dec. safety; OT completed in-depth education and demonstration of proper technique w/ donning/doffing sling, adjusting and UB clothing management; pt's son completed appropriate teach-back   -ST           Lower Body Dressing Assessment/Training    Lower Body Dressing [place] Level  don;pants/bottoms;undergarment;maximum assist (25% patient effort)  -ST        Assistive Devices (Lower Body Dressing)  one hand technique  -ST        Lower Body Dressing Position  unsupported sitting;supported standing  -ST        Comment (Lower Body Dressing)  pt requiring cuing to avoid using sx UE to assist  -ST           Clothes Fastener  Management    [place] Level (Clothes Fastener Management)  buttons;maximum assist (25% patient effort)  -ST           Toileting Assessment/Training    [place] Level (Toileting)  adjust/manage clothing  -ST        Assistive Devices (Toileting)  commode, bedside without drop arms  -ST        Comment (Toileting)  pt u/a to avoid however completed transfer and clothing management, requiring max A   -ST           BADL Safety/Performance    Impairments, BADL Safety/Performance  balance;strength  -ST           General ROM    GENERAL ROM COMMENTS  RUE n/t d/t shoulder and sx precautions; LUE limited at shoulder, fxnl at other j  -           MMT (Manual Muscle Testing)    General MMT Comments  RUE n/t d/t shoulder and sx precautions; LUE limited at shoulder, fxnl at other [facility]  -           Motor Assessment/Interventions    Additional Documentation  Balance (Group);Therapeutic Exercise (Group);Therapeutic Exercise Interventions (Group)  -           Therapeutic Exercise    75147 - OT Therapeutic Exercise Minutes  9  -ST           Therapeutic Exercise    Upper Extremity Range of Motion (Therapeutic Exercise)  wrist flexion/extension, right;forearm supination/pronation, right;elbow flexion/extension, right  -ST        Hand (Therapeutic Exercise)  thumb finger opposition, right;finger flexion/extension, right  -ST        Comment (Therapeutic Exercise)  appropriate teach-back of HEP completed by pt and family   -           Balance    Balance  dynamic sitting balance;dynamic standing balance  -           Dynamic Sitting Balance    Level of [place], Reaches Outside Midline (Sitting, Dynamic Balance)  supervision  -        Sitting Position, Reaches Outside Midline (Sitting, Dynamic Balance)  sitting on edge of bed  -ST           Dynamic Standing Balance    Level of [place], Reaches Outside Midline (Standing, Dynamic Balance)  contact guard assist  -ST           Sensory Assessment/Intervention     Sensory General Assessment  no sensation deficits identified  -ST           Positioning and Restraints    Pre-Treatment Position  in bed  -ST        Post Treatment Position  bed  -ST        In Bed  call light within reach;encouraged to call for assist;sitting EOB with son  -ST           Pain Assessment    Additional Documentation  Pain Scale: Numbers Pre/Post-Treatment (Group)  -ST           Pain Scale: Numbers Pre/Post-Treatment    Pain Scale: Numbers, Pretreatment  0/10 - no pain  -ST        Pain Scale: Numbers, Post-Treatment  0/10 - no pain  -ST           Wound 11/12/18 1240 Right shoulder incision    Wound - Properties Group Date first assessed: 11/12/18  -JA Time first assessed: 1240  -JA Side: Right  -JA Location: shoulder  -JA Type: incision  -JA       Clinical Impression (OT)    Date of Referral to OT  11/13/18  -ST        OT Diagnosis  impaired ADLs; OT to complete per MD preference: hand, wrist, elbow F/E, pronation/supination AROM  -ST        Prognosis (OT Eval)  good  -ST        Patient/Family Goals Statement (OT Eval)  return home  -ST        Therapy Frequency (OT Eval)  evaluation only  -ST        Care Plan Review (OT)  evaluation/treatment results reviewed;care plan/treatment goals reviewed;risks/benefits reviewed;current/potential barriers reviewed;patient/other agree to care plan  -ST        Care Plan Review, Other Participant (OT Eval)  family  -ST        Anticipated Discharge Disposition (OT)  home with home health;home with 24/7 care  -ST           Discharge Summary (Occupational Therapy)    Additional Documentation  Discharge Summary, OT Eval (Group)  -ST           Discharge Summary, OT Eval    Reason for Discharge (OT Discharge Summary)  patient discharged from this facility  -ST        Outcomes Achieved Upon Discharge (OT Discharge Summary)  discharge from facility occurred on same date as evaluation  -ST           Living Environment    Home Accessibility  tub/shower is not walk in  -ST           User Key  (r) = Recorded By, (t) = Taken By, (c) = Cosigned By    Initials Name Effective Dates     Lito Croftalexandra TELLES, OTR 06/10/18 -     Farshad Garg RN 06/16/16 -           Occupational Therapy Education     Title: PT OT SLP Therapies (Active)     Topic: Occupational Therapy (Active)     Point: ADL training (Active)     Description: Instruct learner(s) on proper safety adaptation and remediation techniques during self care or transfers.   Instruct in proper use of assistive devices.    Learning Progress Summary           Patient Acceptance, E,TB,D,H, NR by  at 11/13/2018  9:16 AM    Comment:  benefits of activity, HEP, safety with ADLs, eligio techniques, UBD/UBB, LBD, shoulder ROM precautions, axilla care, home/activity mods, sling donning/doffing and positioning   Family Acceptance, E,TB,D,H, NR by  at 11/13/2018  9:16 AM    Comment:  benefits of activity, HEP, safety with ADLs, eligio techniques, UBD/UBB, LBD, shoulder ROM precautions, axilla care, home/activity mods, sling donning/doffing and positioning                   Point: Home exercise program (Active)     Description: Instruct learner(s) on appropriate technique for monitoring, assisting and/or progressing therapeutic exercises/activities.    Learning Progress Summary           Patient Acceptance, E,TB,D,H, NR by  at 11/13/2018  9:16 AM    Comment:  benefits of activity, HEP, safety with ADLs, eligio techniques, UBD/UBB, LBD, shoulder ROM precautions, axilla care, home/activity mods, sling donning/doffing and positioning   Family Acceptance, E,TB,D,H, NR by  at 11/13/2018  9:16 AM    Comment:  benefits of activity, HEP, safety with ADLs, eligio techniques, UBD/UBB, LBD, shoulder ROM precautions, axilla care, home/activity mods, sling donning/doffing and positioning                   Point: Precautions (Active)     Description: Instruct learner(s) on prescribed precautions during self-care and functional transfers.    Learning Progress  Summary           Patient Acceptance, E,TB,D,H, NR by  at 11/13/2018  9:16 AM    Comment:  benefits of activity, HEP, safety with ADLs, eligio techniques, UBD/UBB, LBD, shoulder ROM precautions, axilla care, home/activity mods, sling donning/doffing and positioning   Family Acceptance, E,TB,D,H, NR by  at 11/13/2018  9:16 AM    Comment:  benefits of activity, HEP, safety with ADLs, eligio techniques, UBD/UBB, LBD, shoulder ROM precautions, axilla care, home/activity mods, sling donning/doffing and positioning                   Point: Body mechanics (Active)     Description: Instruct learner(s) on proper positioning and spine alignment during self-care, functional mobility activities and/or exercises.    Learning Progress Summary           Patient Acceptance, E,TB,D,H, NR by  at 11/13/2018  9:16 AM    Comment:  benefits of activity, HEP, safety with ADLs, eligio techniques, UBD/UBB, LBD, shoulder ROM precautions, axilla care, home/activity mods, sling donning/doffing and positioning   Family Acceptance, E,TB,D,H, NR by  at 11/13/2018  9:16 AM    Comment:  benefits of activity, HEP, safety with ADLs, eligio techniques, UBD/UBB, LBD, shoulder ROM precautions, axilla care, home/activity mods, sling donning/doffing and positioning                               User Key     Initials Effective Dates Name Provider Type Discipline     06/10/18 -  Lacey Croft OTR Occupational Therapist OT                OT Recommendation and Plan  Outcome Summary/Treatment Plan (OT)  Anticipated Discharge Disposition (OT): home with home health, home with 24/7 care  Reason for Discharge (OT Discharge Summary): patient discharged from this facility  Therapy Frequency (OT Eval): evaluation only  Plan of Care Review  Plan of Care Reviewed With: patient  Plan of Care Reviewed With: patient  Outcome Summary: Pt presents s/p reverse R TSA with report of 0/0 pain. Pt impulsive with safety and with mild balance deficits, however family notes  that this is baseline. Family present for all teaching and will be available at d/c 24/7. Family demonstrates appropriate teach-back with ADLs, HEP, sling management and precautions. Plan to d/c home this p.m.         Outcome Measures     Row Name 11/13/18 0916             How much help from another is currently needed...    Putting on and taking off regular lower body clothing?  1  -ST      Bathing (including washing, rinsing, and drying)  2  -ST      Toileting (which includes using toilet bed pan or urinal)  2  -ST      Putting on and taking off regular upper body clothing  1  -ST      Taking care of personal grooming (such as brushing teeth)  2  -ST      Eating meals  3  -ST      Score  11  -ST         Functional Assessment    Outcome Measure Options  AM-PAC 6 Clicks Daily Activity (OT)  -ST        User Key  (r) = Recorded By, (t) = Taken By, (c) = Cosigned By    Initials Name Provider Type    Lacey Jean Baptiste OTR Occupational Therapist          Time Calculation:   Time Calculation- OT     Row Name 11/13/18 0916             Time Calculation- OT    OT Start Time  0916  -ST      OT Received On  11/13/18  -ST         Timed Charges    98303 - OT Therapeutic Exercise Minutes  9  -ST      32605 - OT Self Care/Mgmt Minutes  31  -ST        User Key  (r) = Recorded By, (t) = Taken By, (c) = Cosigned By    Initials Name Provider Type    Lacey Jean Baptiste OTR Occupational Therapist        Therapy Suggested Charges     Code   Minutes Charges    11217 (CPT®) Hc Ot Neuromusc Re Education Ea 15 Min      42286 (CPT®) Hc Ot Ther Proc Ea 15 Min 9 1    82018 (CPT®) Hc Ot Therapeutic Act Ea 15 Min      78243 (CPT®) Hc Ot Manual Therapy Ea 15 Min      03509 (CPT®) Hc Ot Iontophoresis Ea 15 Min      45841 (CPT®) Hc Ot Elec Stim Ea-Per 15 Min      83803 (CPT®) Hc Ot Ultrasound Ea 15 Min      21314 (CPT®) Hc Ot Self Care/Mgmt/Train Ea 15 Min 31 2    Total  40 3        Therapy Charges for Today     Code Description Service Date  Service Provider Modifiers Qty    [id number]  OT SELF CARE/MGMT/TRAIN EA 15 MIN 11/13/2018 Lacey Croft OTR GO 2    52027559823 HC OT EVAL MOD COMPLEXITY 2 11/13/2018 Lacey Croft OTR GO 1    [id number]  OT THER PROC EA 15 MIN 11/13/2018 Lacey Croft OTR GO 1               OT Discharge Summary  Anticipated Discharge Disposition (OT): home with home health, home with 24/7 care  Reason for Discharge: Discharge from facility  Outcomes Achieved: Discharge from facility occurred on same date as evluation  Discharge Destination: Home with home health, Home with assist    JOLYNN Richard  11/13/2018

## 2018-11-14 ENCOUNTER — READMISSION MANAGEMENT (OUTPATIENT)
Dept: CALL CENTER | Facility: HOSPITAL | Age: 78
End: 2018-11-14

## 2018-11-14 NOTE — OUTREACH NOTE
Prep Survey      Responses   Facility patient discharged from?  [place]   Is patient eligible?  Yes   Discharge diagnosis  Right shoulder pain,    S/P right reverse total shoulder arthroplasty   Does the patient have one of the following disease processes/diagnoses(primary or secondary)?  Total Joint Replacement   Does the patient have Home health ordered?  Yes   What is the Home health agency?   [facility]   Is there a DME ordered?  No   Prep survey completed?  Yes          Tori Lopez RN

## 2018-11-14 NOTE — OUTREACH NOTE
Total Joint Week 1 Survey      Responses   Facility patient discharged from?  [place]   Does the patient have one of the following disease processes/diagnoses(primary or secondary)?  Total Joint Replacement   Is there a successful TCM telephone encounter documented?  No   Week 1 attempt successful?  No   Revoke  Readmitted          Tori Lopez RN

## 2018-11-15 LAB — BACTERIA SPEC AEROBE CULT: NORMAL

## 2018-11-27 NOTE — PROGRESS NOTES
Cari Harper  [id number]  1940  11/29/2018      Referring Provider:   ALYSA Arevalo    Reason for Follow Up:   Microcytic anemia     Chief Complaint:  Fatigue  Dyspnea    History of Present Illness   Cari Harper is a very pleasant 78 y.o.  female who presents in follow up appointment for further management and evaluation of microcytic anemia.    Ms. Harper believe that her hemoglobin has been worsening in the last 6 months. In review of her laboratory evaluation it appears that she has had a stable anemia with a hemoglobin in the 9 range since 2016. She reports that she has been more fatigued and more dyspneic in the last several months. She has also has a planned shoulder replacement surgery for November 11th and would like to optimize her hemoglobin prior to surgery. She has never required a blood transfusion, and has been on oral ferrous sulfate 325mg once daily for the last one year and has been tolerating this without any issues. She is also on Eliquis for history of atrial fibrillation and deep venous thrombosis diagnosed two years ago. She denies of any abnormal or unusual bleeding. She believes her last colonoscopy was 2 or 3 years ago and was a routine colonoscopy and was normal as per family.    Treatment History:  IV injectafer: 10/23, 10/30/18    Interim History:  She recently underwent shoulder surgery and as per son did have some bleeding after the surgery. Otherwise she has been doing well without further blood loss and recently started working with physical therapy today and has decreased her pain medication. Her copper level was found to be elevated and her son discontinued her vitamin that she has been taking.    The following portions of the patient's history were reviewed and updated as appropriate: allergies, current medications, past family history, past medical history, past social history, past surgical history and problem list.    No Known  Allergies      Past Medical History:   Diagnosis Date   • Anxiety    • Arthritis    • Asthma    • Atrial fibrillation (CMS/HCC)    • Coronary artery disease    • Depression    • Disease of thyroid gland    • DVT (deep venous thrombosis) (CMS/HCC)    • Eczema    • GERD (gastroesophageal reflux disease)    • HLD (hyperlipidemia) 2018   • On home oxygen therapy     2L at night   • Wears dentures    • Wears glasses    Denies of hypertension    Past Surgical History:   Procedure Laterality Date   • APPENDECTOMY     • CHOLECYSTECTOMY     • COLONOSCOPY     • COLONOSCOPY W/ BIOPSIES     • EYE SURGERY      cataracts   • TUBAL ABDOMINAL LIGATION         Social History     Socioeconomic History   • Marital status: Unknown     Spouse name: Not on file   • Number of children: Not on file   • Years of education: Not on file   • Highest education level: Not on file   Social Needs   • Financial resource strain: Not on file   • Food insecurity - worry: Not on file   • Food insecurity - inability: Not on file   • Transportation needs - medical: Not on file   • Transportation needs - non-medical: Not on file   Occupational History   • Not on file   Tobacco Use   • Smoking status: Former Smoker     Packs/day: 1.00     Years: 0.00     Pack years: 0.00     Types: Cigarettes     Start date: 1961     Last attempt to quit: 1986     Years since quittin.3   • Smokeless tobacco: Never Used   Substance and Sexual Activity   • Alcohol use: No   • Drug use: No   • Sexual activity: Defer   Other Topics Concern   • Not on file   Social History Narrative   • Not on file   She is , lives by herself but her children live next to her. She was a previous smoker for 20-30 years and quit 30 years ago. No alcohol or illicit drug use. She is a .         Family History   Problem Relation Age of Onset   • Cancer Father    • No Known Problems Mother    Son -  at age of 49 from Sinus cancer    Son -  from  [place]  Sister - brain tumor       Current Outpatient Medications:   •  acetaminophen (TYLENOL) 500 MG tablet, Take 500 mg by mouth Every 6 (Six) Hours As Needed for Mild Pain ., Disp: , Rfl:   •  apixaban (ELIQUIS) 5 MG tablet tablet, Take 1 tablet by mouth Every 12 (Twelve) Hours., Disp: 60 tablet, Rfl: 5  •  budesonide-formoterol (SYMBICORT) 160-4.5 MCG/ACT inhaler, Inhale 2 puffs 2 (Two) Times a Day., Disp: , Rfl:   •  bumetanide (BUMEX) 2 MG tablet, Take 1 tablet by mouth Daily., Disp: 30 tablet, Rfl: 11  •  clonazePAM (KlonoPIN) 0.5 MG tablet, Take 0.5 mg by mouth Daily As Needed., Disp: , Rfl:   •  clonazePAM (KlonoPIN) 1 MG tablet, Take 1 mg by mouth Every Night., Disp: , Rfl:   •  docusate sodium 100 MG capsule, Take 100 mg by mouth 2 (Two) Times a Day As Needed for Constipation. Over the counter, Disp: , Rfl:   •  ferrous sulfate 325 (65 FE) MG tablet, Take 325 mg by mouth 2 (Two) Times a Day., Disp: , Rfl:   •  flecainide (TAMBOCOR) 50 MG tablet, Take 1 tablet by mouth Every 12 (Twelve) Hours., Disp: 60 tablet, Rfl: 11  •  FLUoxetine (PROzac) 20 MG capsule, Take 20 mg by mouth 2 (two) times a day., Disp: , Rfl:   •  loratadine (CLARITIN) 10 MG tablet, Take 10 mg by mouth Daily., Disp: , Rfl:   •  metaxalone (SKELAXIN) 800 MG tablet, Take 1 tablet by mouth Every 6 (Six) Hours As Needed for Muscle Spasms., Disp: 12 tablet, Rfl: 0  •  nitrofurantoin, macrocrystal-monohydrate, (MACROBID) 100 MG capsule, Take 1 capsule by mouth Every 12 (Twelve) Hours., Disp: 10 capsule, Rfl: 0  •  O2 (OXYGEN), Inhale 2 L/min Every Night., Disp: , Rfl:   •  Omega-3 Fatty Acids (FISH OIL) 1200 MG capsule capsule, Take 2,400 mg by mouth Every Evening., Disp: , Rfl:   •  omeprazole (priLOSEC) 20 MG capsule, Take 20 mg by mouth 2 (Two) Times a Day., Disp: , Rfl:   •  oxyCODONE-acetaminophen (PERCOCET) 5-325 MG per tablet, Take 1-2 tablets by mouth Every 4-6 Hours As Needed for post-op pain, Disp: 60 tablet, Rfl: 0  •   potassium chloride (K-DUR) 10 MEQ CR tablet, Take 1 tablet by mouth Daily., Disp: 30 tablet, Rfl: 11  •  Thyroid 60 MG PO tablet, Take 60 mg by mouth Every Morning., Disp: , Rfl:         Review of Systems  Pertinent positives are listed as per history of present of illness, all other systems reviewed and are negative.        Physical Exam  Vital Signs: These were reviewed and listed as per patient’s electronic medical chart  Vitals:    11/29/18 1411   BP: 131/58   Pulse: 60   Resp: 20   Temp: 97 °F (36.1 °C)   SpO2: 97%     General: Awake, alert and oriented, in no distress, elderly, in a wheelchair  HEENT: Head is atraumatic, normocephalic, extraocular movements full, oropharynx clear, no scleral icterus, pink moist mucous membranes  Neck: supple, no jvd, lymphadenopathy or masses  Cardiovascular: regular rate and rhythm without murmurs, rubs or gallops  Pulmonary: non-labored, clear to auscultation bilaterally, no wheezing  Abdomen: soft, non-tender, non-distended, normal active bowel sounds present  Extremities: No clubbing, cyanosis or edema, arm in sling  Lymph: No cervical, supraclavicular adenopathy  Neurologic: Mental status as above, alert, awake and oriented, grossly non-focal exam, in a wheelchair  Skin: warm, dry, intact  Patient was examined on 11/29/18 and changes are reflected and noted above.      Labs / Studies:    Office Visit on 11/29/2018   Component Date Value   • Iron 11/29/2018 38*   • TIBC 11/29/2018 238*   • Iron Saturation 11/29/2018 16    • Ferritin 11/29/2018 967.00*   • Vitamin B-12 11/29/2018 1,216*   • Folate 11/29/2018 >24.00*   • Sed Rate 11/29/2018 88*   • C-Reactive Protein 11/29/2018 6.44*   • WBC 11/29/2018 7.25    • RBC 11/29/2018 3.67*   • Hemoglobin 11/29/2018 10.0*   • Hematocrit 11/29/2018 31.7*   • MCV 11/29/2018 86.4    • MCH 11/29/2018 27.2    • MCHC 11/29/2018 31.5*   • RDW 11/29/2018 17.7*   • RDW-SD 11/29/2018 56.2*   • MPV 11/29/2018 10.7*   • Platelets 11/29/2018 286     • Neutrophil % 11/29/2018 74.2    • Lymphocyte % 11/29/2018 12.4*   • Monocyte % 11/29/2018 9.7    • Eosinophil % 11/29/2018 3.3    • Basophil % 11/29/2018 0.3    • Immature Grans % 11/29/2018 0.1    • Neutrophils, Absolute 11/29/2018 5.38    • Lymphocytes, Absolute 11/29/2018 0.90*   • Monocytes, Absolute 11/29/2018 0.70    • Eosinophils, Absolute 11/29/2018 0.24    • Basophils, Absolute 11/29/2018 0.02    • Immature Grans, Absolute 11/29/2018 0.01    Admission on 11/12/2018, Discharged on 11/13/2018   Component Date Value   • ABO Type 11/12/2018 B    • RH type 11/12/2018 Negative    • Glucose 11/13/2018 124*   • BUN 11/13/2018 15    • Creatinine 11/13/2018 0.75    • Sodium 11/13/2018 135    • Potassium 11/13/2018 3.6    • Chloride 11/13/2018 101    • CO2 11/13/2018 27.0    • Calcium 11/13/2018 8.8    • eGFR Non  Amer 11/13/2018 75    • BUN/Creatinine Ratio 11/13/2018 20.0    • Anion Gap 11/13/2018 7.0    • WBC 11/13/2018 6.86    • RBC 11/13/2018 3.84*   • Hemoglobin 11/13/2018 10.1*   • Hematocrit 11/13/2018 32.7*   • MCV 11/13/2018 85.2    • MCH 11/13/2018 26.3*   • MCHC 11/13/2018 30.9*   • RDW 11/13/2018 17.2*   • RDW-SD 11/13/2018 53.4    • MPV 11/13/2018 10.6    • Platelets 11/13/2018 241    • Neutrophil % 11/13/2018 86.7*   • Lymphocyte % 11/13/2018 7.9*   • Monocyte % 11/13/2018 5.4    • Eosinophil % 11/13/2018 0.0    • Basophil % 11/13/2018 0.0    • Immature Grans % 11/13/2018 0.1    • Neutrophils, Absolute 11/13/2018 5.95    • Lymphocytes, Absolute 11/13/2018 0.54*   • Monocytes, Absolute 11/13/2018 0.37    • Eosinophils, Absolute 11/13/2018 0.00    • Basophils, Absolute 11/13/2018 0.00    • Immature Grans, Absolute 11/13/2018 0.01    • Color, UA 11/13/2018 Yellow    • Appearance, UA 11/13/2018 Clear    • pH, UA 11/13/2018 7.0    • Specific Gravity, UA 11/13/2018 1.010    • Glucose, UA 11/13/2018 Negative    • Ketones, UA 11/13/2018 Negative    • Bilirubin, UA 11/13/2018 Negative    • Blood, UA  11/13/2018 Negative    • Protein, UA 11/13/2018 Negative    • Leuk Esterase, UA 11/13/2018 Trace*   • Nitrite, UA 11/13/2018 Negative    • Urobilinogen, UA 11/13/2018 0.2 E.U./dL    • RBC, UA 11/13/2018 None Seen    • WBC, UA 11/13/2018 6-12*   • Bacteria, UA 11/13/2018 None Seen    • Squamous Epithelial Cell* 11/13/2018 0-2    • Hyaline Casts, UA 11/13/2018 0-6    • Methodology 11/13/2018 Automated Microscopy    • Urine Culture 11/13/2018 No growth at 2 days    Appointment on 11/11/2018   Component Date Value   • Hemoglobin A1C 11/11/2018 5.20    • WBC 11/11/2018 6.73    • RBC 11/11/2018 4.09    • Hemoglobin 11/11/2018 10.8*   • Hematocrit 11/11/2018 34.6    • MCV 11/11/2018 84.6    • MCH 11/11/2018 26.4*   • MCHC 11/11/2018 31.2*   • RDW 11/11/2018 17.4*   • RDW-SD 11/11/2018 54.5*   • MPV 11/11/2018 10.8    • Platelets 11/11/2018 254    • ABO Type 11/11/2018 B    • RH type 11/11/2018 Negative    • Antibody Screen 11/11/2018 Negative    • T&S Expiration Date 11/11/2018 11/14/2018 11:59:59 PM    • Potassium 11/11/2018 3.6    Lab on 10/30/2018   Component Date Value   • Copper 10/30/2018 178*   Consult on 10/19/2018   Component Date Value   • Iron 10/19/2018 17*   • TIBC 10/19/2018 268    • Iron Saturation 10/19/2018 6*   • Ferritin 10/19/2018 86.00    • Copper 10/19/2018 188*   • Zinc 10/19/2018 56    • Tissue Transglutaminase * 10/19/2018 <2    • Reticulocyte % 10/19/2018 1.23    • Reticulocyte Absolute 10/19/2018 0.0439    • LDH 10/19/2018 193    • Haptoglobin 10/19/2018 298*   • WBC 10/19/2018 7.11    • RBC 10/19/2018 3.57*   • Hemoglobin 10/19/2018 9.0*   • Hematocrit 10/19/2018 29.5*   • MCV 10/19/2018 82.6    • MCH 10/19/2018 25.2*   • MCHC 10/19/2018 30.5*   • RDW 10/19/2018 14.9*   • RDW-SD 10/19/2018 45.0    • MPV 10/19/2018 10.7*   • Platelets 10/19/2018 294    • Neutrophil % 10/19/2018 77.7*   • Lymphocyte % 10/19/2018 12.7*   • Monocyte % 10/19/2018 7.7    • Eosinophil % 10/19/2018 1.5    • Basophil %  10/19/2018 0.1    • Immature Grans % 10/19/2018 0.3    • Neutrophils, Absolute 10/19/2018 5.52    • Lymphocytes, Absolute 10/19/2018 0.90*   • Monocytes, Absolute 10/19/2018 0.55    • Eosinophils, Absolute 10/19/2018 0.11    • Basophils, Absolute 10/19/2018 0.01    • Immature Grans, Absolute 10/19/2018 0.02         Assessment/Plan   Cari Harper is a very pleasant 78 y.o.  female who presents in follow up appointment for further management and evaluation of microcytic anemia.     Microcytic anemia  Iron deficiency  - We spent a great deal of time during her appointment today discussing the differential for anemia and treatment for various diagnosis.  - She continues to have an iron deficiency despite oral iron therefore I suspect she likely has malabsorption, possibly due to PPI, and therefore she was started on IV injectafer. Tissue transglutaminase level and zinc were obtained to further evaluate iron deficiency and were normal. However copper level was found to be elevated and this was repeated and again found to be elevated and further discussed below.   - B12 and folate levels show repletion  - LDH and retic count are normal thus less likely to be hemolysis, haptoglobin in fact elevated  - Today her iron levels are consistent with anemia of chronic inflammation which would also go along with elevation in CRP and ESR and could be due to recent surgery.  - Will also obtain a peripheral smear, SPEP and serum free light chains.    Copper elevation  - She was advised to discontinue any vitamins containing Copper. Will repeat level today along with ceruloplasmin and 24 hour urine copper level. Will also repeat zinc level.    ACO Quality measures  - Cari Harper does not use tobacco products.  - Patient's Body mass index is 34.12 kg/m². BMI is above normal parameters. Recommendations include: referral to primary care.  - Current outpatient and discharge medications have been reconciled for the  patient.  Reviewed by: Inés Hall MD      I will have the patient return in follow up appointment to review test results in six weeks. She understands that should she have any questions or concerns prior to her appointment she should give us a call at any time and I would be happy to see her sooner. It was a pleasure to see this patient in clinic today, thank you for allowing me to participate in the care of this patient.        Inés Hall MD  11/29/2018  4:23 PM

## 2018-11-29 ENCOUNTER — OFFICE VISIT (OUTPATIENT)
Dept: ONCOLOGY | Facility: CLINIC | Age: 78
End: 2018-11-29

## 2018-11-29 VITALS
HEART RATE: 60 BPM | DIASTOLIC BLOOD PRESSURE: 58 MMHG | TEMPERATURE: 97 F | OXYGEN SATURATION: 97 % | SYSTOLIC BLOOD PRESSURE: 131 MMHG | BODY MASS INDEX: 34.12 KG/M2 | WEIGHT: 180.6 LBS | RESPIRATION RATE: 20 BRPM

## 2018-11-29 DIAGNOSIS — D64.9 NORMOCYTIC ANEMIA: ICD-10-CM

## 2018-11-29 DIAGNOSIS — R78.79 HIGH BLOOD COPPER LEVEL: ICD-10-CM

## 2018-11-29 DIAGNOSIS — D50.9 IRON DEFICIENCY ANEMIA, UNSPECIFIED IRON DEFICIENCY ANEMIA TYPE: Primary | ICD-10-CM

## 2018-11-29 LAB
BASOPHILS # BLD AUTO: 0.02 10*3/MM3 (ref 0–0.3)
BASOPHILS NFR BLD AUTO: 0.3 % (ref 0–2)
CRP SERPL-MCNC: 6.44 MG/DL (ref 0–0.99)
DEPRECATED RDW RBC AUTO: 56.2 FL (ref 37–54)
EOSINOPHIL # BLD AUTO: 0.24 10*3/MM3 (ref 0–0.7)
EOSINOPHIL NFR BLD AUTO: 3.3 % (ref 0–7)
ERYTHROCYTE [DISTWIDTH] IN BLOOD BY AUTOMATED COUNT: 17.7 % (ref 11.5–14.5)
ERYTHROCYTE [SEDIMENTATION RATE] IN BLOOD: 88 MM/HR (ref 0–30)
FERRITIN SERPL-MCNC: 967 NG/ML (ref 10–290.3)
FOLATE SERPL-MCNC: >24 NG/ML (ref 5.4–20)
HCT VFR BLD AUTO: 31.7 % (ref 37–47)
HGB BLD-MCNC: 10 G/DL (ref 12–16)
IMM GRANULOCYTES # BLD: 0.01 10*3/MM3 (ref 0–0.03)
IMM GRANULOCYTES NFR BLD: 0.1 % (ref 0–0.5)
IRON 24H UR-MRATE: 38 MCG/DL (ref 49–151)
IRON SATN MFR SERPL: 16 % (ref 15–50)
LYMPHOCYTES # BLD AUTO: 0.9 10*3/MM3 (ref 1–3)
LYMPHOCYTES NFR BLD AUTO: 12.4 % (ref 16–46)
MCH RBC QN AUTO: 27.2 PG (ref 27–33)
MCHC RBC AUTO-ENTMCNC: 31.5 G/DL (ref 33–37)
MCV RBC AUTO: 86.4 FL (ref 80–94)
MONOCYTES # BLD AUTO: 0.7 10*3/MM3 (ref 0.1–0.9)
MONOCYTES NFR BLD AUTO: 9.7 % (ref 0–12)
NEUTROPHILS # BLD AUTO: 5.38 10*3/MM3 (ref 1.4–6.5)
NEUTROPHILS NFR BLD AUTO: 74.2 % (ref 40–75)
PLATELET # BLD AUTO: 286 10*3/MM3 (ref 130–400)
PMV BLD AUTO: 10.7 FL (ref 6–10)
RBC # BLD AUTO: 3.67 10*6/MM3 (ref 4.2–5.4)
TIBC SERPL-MCNC: 238 MCG/DL (ref 241–421)
VIT B12 BLD-MCNC: 1216 PG/ML (ref 211–911)
WBC NRBC COR # BLD: 7.25 10*3/MM3 (ref 4.5–12.5)

## 2018-11-29 PROCEDURE — 99214 OFFICE O/P EST MOD 30 MIN: CPT | Performed by: INTERNAL MEDICINE

## 2018-11-29 PROCEDURE — 82784 ASSAY IGA/IGD/IGG/IGM EACH: CPT | Performed by: INTERNAL MEDICINE

## 2018-11-29 PROCEDURE — 84155 ASSAY OF PROTEIN SERUM: CPT | Performed by: INTERNAL MEDICINE

## 2018-11-29 PROCEDURE — 86334 IMMUNOFIX E-PHORESIS SERUM: CPT | Performed by: INTERNAL MEDICINE

## 2018-11-29 PROCEDURE — 83550 IRON BINDING TEST: CPT | Performed by: INTERNAL MEDICINE

## 2018-11-29 PROCEDURE — 83883 ASSAY NEPHELOMETRY NOT SPEC: CPT | Performed by: INTERNAL MEDICINE

## 2018-11-29 PROCEDURE — 84630 ASSAY OF ZINC: CPT | Performed by: INTERNAL MEDICINE

## 2018-11-29 PROCEDURE — 85652 RBC SED RATE AUTOMATED: CPT | Performed by: INTERNAL MEDICINE

## 2018-11-29 PROCEDURE — 82746 ASSAY OF FOLIC ACID SERUM: CPT | Performed by: INTERNAL MEDICINE

## 2018-11-29 PROCEDURE — 82728 ASSAY OF FERRITIN: CPT | Performed by: INTERNAL MEDICINE

## 2018-11-29 PROCEDURE — 82390 ASSAY OF CERULOPLASMIN: CPT | Performed by: INTERNAL MEDICINE

## 2018-11-29 PROCEDURE — 36415 COLL VENOUS BLD VENIPUNCTURE: CPT | Performed by: INTERNAL MEDICINE

## 2018-11-29 PROCEDURE — 83540 ASSAY OF IRON: CPT | Performed by: INTERNAL MEDICINE

## 2018-11-29 PROCEDURE — 86140 C-REACTIVE PROTEIN: CPT | Performed by: INTERNAL MEDICINE

## 2018-11-29 PROCEDURE — 82607 VITAMIN B-12: CPT | Performed by: INTERNAL MEDICINE

## 2018-11-29 PROCEDURE — 85060 BLOOD SMEAR INTERPRETATION: CPT | Performed by: INTERNAL MEDICINE

## 2018-11-29 PROCEDURE — 84165 PROTEIN E-PHORESIS SERUM: CPT | Performed by: INTERNAL MEDICINE

## 2018-11-29 PROCEDURE — 85025 COMPLETE CBC W/AUTO DIFF WBC: CPT | Performed by: INTERNAL MEDICINE

## 2018-11-29 PROCEDURE — 82525 ASSAY OF COPPER: CPT | Performed by: INTERNAL MEDICINE

## 2018-11-30 LAB
ALBUMIN SERPL-MCNC: 3.1 G/DL (ref 2.9–4.4)
ALBUMIN/GLOB SERPL: 1 {RATIO} (ref 0.7–1.7)
ALPHA1 GLOB FLD ELPH-MCNC: 0.4 G/DL (ref 0–0.4)
ALPHA2 GLOB SERPL ELPH-MCNC: 1 G/DL (ref 0.4–1)
B-GLOBULIN SERPL ELPH-MCNC: 1.2 G/DL (ref 0.7–1.3)
CERULOPLASMIN SERPL-MCNC: 43.3 MG/DL (ref 19–39)
GAMMA GLOB SERPL ELPH-MCNC: 0.8 G/DL (ref 0.4–1.8)
GLOBULIN SER CALC-MCNC: 3.4 G/DL (ref 2.2–3.9)
IGA SERPL-MCNC: 334 MG/DL (ref 64–422)
IGG SERPL-MCNC: 726 MG/DL (ref 700–1600)
IGM SERPL-MCNC: 82 MG/DL (ref 26–217)
INTERPRETATION SERPL IEP-IMP: ABNORMAL
KAPPA LC SERPL-MCNC: 21.4 MG/L (ref 3.3–19.4)
KAPPA LC/LAMBDA SER: 1.13 {RATIO} (ref 0.26–1.65)
LAMBDA LC FREE SERPL-MCNC: 19 MG/L (ref 5.7–26.3)
Lab: ABNORMAL
M-SPIKE: 0.3 G/DL
PROT SERPL-MCNC: 6.5 G/DL (ref 6–8.5)

## 2018-12-01 LAB
COPPER SERPL-MCNC: 173 UG/DL (ref 72–166)
ZINC SERPL-MCNC: 64 UG/DL (ref 56–134)

## 2018-12-04 PROCEDURE — 82570 ASSAY OF URINE CREATININE: CPT | Performed by: INTERNAL MEDICINE

## 2018-12-04 PROCEDURE — 81050 URINALYSIS VOLUME MEASURE: CPT | Performed by: INTERNAL MEDICINE

## 2018-12-04 PROCEDURE — 82525 ASSAY OF COPPER: CPT | Performed by: INTERNAL MEDICINE

## 2018-12-07 LAB
COPPER UR-MCNC: 12 UG/L
COPPER/CREAT UR: 21 UG/G CREAT (ref 0–49)
CREAT 24H UR-MCNC: 0.58 G/L (ref 0.3–3)

## 2018-12-17 ENCOUNTER — DOCUMENTATION (OUTPATIENT)
Dept: ONCOLOGY | Facility: HOSPITAL | Age: 78
End: 2018-12-17

## 2018-12-17 NOTE — PROGRESS NOTES
Patient's  called and asked if patient should stop drinking Slim fast because her labs were abnormal. Per Dr. De Los Santos, patient can continue drinking Slim fast.  verbalized understanding.

## 2019-01-09 NOTE — PROGRESS NOTES
Cari Harper  [id number]  1940  1/10/2019      Referring Provider:   ALYSA Arevalo    Reason for Follow Up:   Normocytic anemia  Iron deficiency anemia  Monoclonal gammopathy of undetermined significance (MGUS)     Chief Complaint:  Fatigue      History of Present Illness   Cari Harper is a very pleasant 78 y.o.  female who presents in follow up appointment for further management and evaluation of normocytic anemia.    During her initial appointment Ms. Harper believed that her hemoglobin had been worsening for six months prior to her appointment with me. In review of her laboratory evaluation it appeared that she has had stable anemia with a hemoglobin in the 9 range since 2016. She had been more fatigued and dyspneic and recently underwent shoulder replacement surgery November 2018 and initially began following with me to help optimize her hemoglobin prior to surgery. She has never required a blood transfusion, and has been on oral ferrous sulfate 325mg once daily for one year and tolerating without any issues. She is also on Eliquis for history of atrial fibrillation and deep venous thrombosis diagnosed two years ago. She denies of any abnormal or unusual bleeding. She believed her last colonoscopy was 2 or 3 years ago and was a routine colonoscopy and was normal as per family.    Treatment History:  IV injectafer: 10/23, 10/30/18    Interim History:  Since she has been following with me she has continued to have stable anemia despite IV iron replacement. She has also still been taking oral iron which she was to discontinue with the start of oral iron. In workup for iron deficiency she was also found to have an elevated copper level despite stopping all multivitamins. She denies of any history of kidney disease and denies of any nausea, vomiting, diarrhea, abdominal pain or fevers. She has been working with physical therapy and her range of motion has improved in her shoulder.  She denies of any unusual or new bone pain.      The following portions of the patient's history were reviewed and updated as appropriate: allergies, current medications, past family history, past medical history, past social history, past surgical history and problem list.    No Known Allergies      Past Medical History:   Diagnosis Date   • Anxiety    • Arthritis    • Asthma    • Atrial fibrillation (CMS/HCC)    • Coronary artery disease    • Depression    • Disease of thyroid gland    • DVT (deep venous thrombosis) (CMS/HCC)    • Eczema    • GERD (gastroesophageal reflux disease)    • HLD (hyperlipidemia) 2018   • On home oxygen therapy     2L at night   • Wears dentures    • Wears glasses    Denies of hypertension    Past Surgical History:   Procedure Laterality Date   • APPENDECTOMY     • CHOLECYSTECTOMY     • COLONOSCOPY     • COLONOSCOPY W/ BIOPSIES     • EYE SURGERY      cataracts   • TOTAL SHOULDER ARTHROPLASTY W/ DISTAL CLAVICLE EXCISION Right 2018    Procedure: RIGHT REVERSE TOTAL SHOULDER REPLACEMENT;  Surgeon: Robbie Silveira MD;  Location: [facility];  Service: Orthopedics   • TUBAL ABDOMINAL LIGATION         Social History     Socioeconomic History   • Marital status: Unknown     Spouse name: Not on file   • Number of children: Not on file   • Years of education: Not on file   • Highest education level: Not on file   Social Needs   • Financial resource strain: Not on file   • Food insecurity - worry: Not on file   • Food insecurity - inability: Not on file   • Transportation needs - medical: Not on file   • Transportation needs - non-medical: Not on file   Occupational History   • Not on file   Tobacco Use   • Smoking status: Former Smoker     Packs/day: 1.00     Years: 0.00     Pack years: 0.00     Types: Cigarettes     Start date: 1961     Last attempt to quit: 1986     Years since quittin.4   • Smokeless tobacco: Never Used   Substance and Sexual Activity   • Alcohol use:  No   • Drug use: No   • Sexual activity: Defer   Other Topics Concern   • Not on file   Social History Narrative   • Not on file   She is , lives by herself but her children live next to her. She was a previous smoker for 20-30 years and quit 30 years ago. No alcohol or illicit drug use. She is a .         Family History   Problem Relation Age of Onset   • Cancer Father    • No Known Problems Mother    Son -  at age of 49 from Sinus cancer    Son -  from [facility]  Sister - brain tumor       Current Outpatient Medications:   •  acetaminophen (TYLENOL) 500 MG tablet, Take 500 mg by mouth Every 6 (Six) Hours As Needed for Mild Pain ., Disp: , Rfl:   •  apixaban (ELIQUIS) 5 MG tablet tablet, Take 1 tablet by mouth Every 12 (Twelve) Hours., Disp: 60 tablet, Rfl: 5  •  budesonide-formoterol (SYMBICORT) 160-4.5 MCG/ACT inhaler, Inhale 2 puffs 2 (Two) Times a Day., Disp: , Rfl:   •  bumetanide (BUMEX) 2 MG tablet, Take 1 tablet by mouth Daily., Disp: 30 tablet, Rfl: 11  •  clonazePAM (KlonoPIN) 0.5 MG tablet, Take 0.5 mg by mouth Daily As Needed., Disp: , Rfl:   •  clonazePAM (KlonoPIN) 1 MG tablet, Take 1 mg by mouth Every Night., Disp: , Rfl:   •  docusate sodium 100 MG capsule, Take 100 mg by mouth 2 (Two) Times a Day As Needed for Constipation. Over the counter, Disp: , Rfl:   •  ferrous sulfate 325 (65 FE) MG tablet, Take 325 mg by mouth 2 (Two) Times a Day., Disp: , Rfl:   •  flecainide (TAMBOCOR) 50 MG tablet, Take 1 tablet by mouth Every 12 (Twelve) Hours., Disp: 60 tablet, Rfl: 11  •  FLUoxetine (PROzac) 20 MG capsule, Take 20 mg by mouth 2 (two) times a day., Disp: , Rfl:   •  loratadine (CLARITIN) 10 MG tablet, Take 10 mg by mouth Daily., Disp: , Rfl:   •  metaxalone (SKELAXIN) 800 MG tablet, Take 1 tablet by mouth Every 6 (Six) Hours As Needed for Muscle Spasms., Disp: 12 tablet, Rfl: 0  •  nitrofurantoin, macrocrystal-monohydrate, (MACROBID) 100 MG capsule, Take 1 capsule by  mouth Every 12 (Twelve) Hours., Disp: 10 capsule, Rfl: 0  •  O2 (OXYGEN), Inhale 2 L/min Every Night., Disp: , Rfl:   •  Omega-3 Fatty Acids (FISH OIL) 1200 MG capsule capsule, Take 2,400 mg by mouth Every Evening., Disp: , Rfl:   •  omeprazole (priLOSEC) 20 MG capsule, Take 20 mg by mouth 2 (Two) Times a Day., Disp: , Rfl:   •  oxyCODONE-acetaminophen (PERCOCET) 5-325 MG per tablet, Take 1-2 tablets by mouth Every 4-6 Hours As Needed for post-op pain, Disp: 60 tablet, Rfl: 0  •  potassium chloride (K-DUR) 10 MEQ CR tablet, Take 1 tablet by mouth Daily., Disp: 30 tablet, Rfl: 11  •  Thyroid 60 MG PO tablet, Take 60 mg by mouth Every Morning., Disp: , Rfl:         Review of Systems  A comprehensive 14 point review of systems was performed.  Significant findings as mentioned above.  All other systems reviewed and are negative.        Physical Exam  Vital Signs: These were reviewed and listed as per patient’s electronic medical chart  Vitals:    01/10/19 1256   BP: 135/54   Pulse: 60   Resp: 18   Temp: 97.9 °F (36.6 °C)   SpO2: 99%     General: Awake, alert and oriented, in no distress, elderly, in a wheelchair  HEENT: Head is atraumatic, normocephalic, extraocular movements full, oropharynx clear, no scleral icterus, pink moist mucous membranes  Neck: supple, no jvd, lymphadenopathy or masses  Cardiovascular: regular rate and rhythm without murmurs, rubs or gallops  Pulmonary: non-labored, clear to auscultation bilaterally, no wheezing  Abdomen: soft, non-tender, non-distended, normal active bowel sounds present  Extremities: No clubbing, cyanosis or edema  Lymph: No cervical, supraclavicular adenopathy  Neurologic: Mental status as above, alert, awake and oriented, grossly non-focal exam, in a wheelchair  Skin: warm, dry, intact  Patient was examined on 01/10/19 and changes are reflected and noted above.      Labs / Studies:    Office Visit on 11/29/2018   Component Date Value   • Iron 11/29/2018 38*   • TIBC  11/29/2018 238*   • Iron Saturation 11/29/2018 16    • Ferritin 11/29/2018 967.00*   • Copper 11/29/2018 173*   • Zinc 11/29/2018 64    • Ceruloplasmin 11/29/2018 43.3*   • Copper, urine 12/04/2018 12    • Creatinine, Urine 12/04/2018 0.58    • Copper /G Creat 12/04/2018 21    • Vitamin B-12 11/29/2018 1,216*   • Folate 11/29/2018 >24.00*   • Sed Rate 11/29/2018 88*   • C-Reactive Protein 11/29/2018 6.44*   • Performed by: 11/29/2018 Dr amarilys ashley    • Pathologist Interpretati* 11/29/2018 See Scanned Report      • IgG 11/29/2018 726    • IgA 11/29/2018 334    • IgM 11/29/2018 82    • Total Protein 11/29/2018 6.5    • Albumin 11/29/2018 3.1    • Alpha-1-Globulin 11/29/2018 0.4    • Alpha-2-Globulin 11/29/2018 1.0    • Beta Globulin 11/29/2018 1.2    • Gamma Globulin 11/29/2018 0.8    • M-Jame 11/29/2018 0.3*   • Globulin 11/29/2018 3.4    • A/G Ratio 11/29/2018 1.0    • Immunofixation Reflex, S* 11/29/2018 Comment    • Please note 11/29/2018 Comment    • Free Light Chain, Kappa 11/29/2018 21.4*   • Free Lambda Light Chains 11/29/2018 19.0    • Kappa/Lambda Ratio 11/29/2018 1.13    • WBC 11/29/2018 7.25    • RBC 11/29/2018 3.67*   • Hemoglobin 11/29/2018 10.0*   • Hematocrit 11/29/2018 31.7*   • MCV 11/29/2018 86.4    • MCH 11/29/2018 27.2    • MCHC 11/29/2018 31.5*   • RDW 11/29/2018 17.7*   • RDW-SD 11/29/2018 56.2*   • MPV 11/29/2018 10.7*   • Platelets 11/29/2018 286    • Neutrophil % 11/29/2018 74.2    • Lymphocyte % 11/29/2018 12.4*   • Monocyte % 11/29/2018 9.7    • Eosinophil % 11/29/2018 3.3    • Basophil % 11/29/2018 0.3    • Immature Grans % 11/29/2018 0.1    • Neutrophils, Absolute 11/29/2018 5.38    • Lymphocytes, Absolute 11/29/2018 0.90*   • Monocytes, Absolute 11/29/2018 0.70    • Eosinophils, Absolute 11/29/2018 0.24    • Basophils, Absolute 11/29/2018 0.02    • Immature Grans, Absolute 11/29/2018 0.01    Admission on 11/12/2018, Discharged on 11/13/2018   Component Date Value   • ABO Type  11/12/2018 B    • RH type 11/12/2018 Negative    • Glucose 11/13/2018 124*   • BUN 11/13/2018 15    • Creatinine 11/13/2018 0.75    • Sodium 11/13/2018 135    • Potassium 11/13/2018 3.6    • Chloride 11/13/2018 101    • CO2 11/13/2018 27.0    • Calcium 11/13/2018 8.8    • eGFR Non  Amer 11/13/2018 75    • BUN/Creatinine Ratio 11/13/2018 20.0    • Anion Gap 11/13/2018 7.0    • WBC 11/13/2018 6.86    • RBC 11/13/2018 3.84*   • Hemoglobin 11/13/2018 10.1*   • Hematocrit 11/13/2018 32.7*   • MCV 11/13/2018 85.2    • MCH 11/13/2018 26.3*   • MCHC 11/13/2018 30.9*   • RDW 11/13/2018 17.2*   • RDW-SD 11/13/2018 53.4    • MPV 11/13/2018 10.6    • Platelets 11/13/2018 241    • Neutrophil % 11/13/2018 86.7*   • Lymphocyte % 11/13/2018 7.9*   • Monocyte % 11/13/2018 5.4    • Eosinophil % 11/13/2018 0.0    • Basophil % 11/13/2018 0.0    • Immature Grans % 11/13/2018 0.1    • Neutrophils, Absolute 11/13/2018 5.95    • Lymphocytes, Absolute 11/13/2018 0.54*   • Monocytes, Absolute 11/13/2018 0.37    • Eosinophils, Absolute 11/13/2018 0.00    • Basophils, Absolute 11/13/2018 0.00    • Immature Grans, Absolute 11/13/2018 0.01    • Color, UA 11/13/2018 Yellow    • Appearance, UA 11/13/2018 Clear    • pH, UA 11/13/2018 7.0    • Specific Gravity, UA 11/13/2018 1.010    • Glucose, UA 11/13/2018 Negative    • Ketones, UA 11/13/2018 Negative    • Bilirubin, UA 11/13/2018 Negative    • Blood, UA 11/13/2018 Negative    • Protein, UA 11/13/2018 Negative    • Leuk Esterase, UA 11/13/2018 Trace*   • Nitrite, UA 11/13/2018 Negative    • Urobilinogen, UA 11/13/2018 0.2 E.U./dL    • RBC, UA 11/13/2018 None Seen    • WBC, UA 11/13/2018 6-12*   • Bacteria, UA 11/13/2018 None Seen    • Squamous Epithelial Cell* 11/13/2018 0-2    • Hyaline Casts, UA 11/13/2018 0-6    • Methodology 11/13/2018 Automated Microscopy    • Urine Culture 11/13/2018 No growth at 2 days    Appointment on 11/11/2018   Component Date Value   • Hemoglobin A1C 11/11/2018  5.20    • WBC 11/11/2018 6.73    • RBC 11/11/2018 4.09    • Hemoglobin 11/11/2018 10.8*   • Hematocrit 11/11/2018 34.6    • MCV 11/11/2018 84.6    • MCH 11/11/2018 26.4*   • MCHC 11/11/2018 31.2*   • RDW 11/11/2018 17.4*   • RDW-SD 11/11/2018 54.5*   • MPV 11/11/2018 10.8    • Platelets 11/11/2018 254    • ABO Type 11/11/2018 B    • RH type 11/11/2018 Negative    • Antibody Screen 11/11/2018 Negative    • T&S Expiration Date 11/11/2018 11/14/2018 11:59:59 PM    • Potassium 11/11/2018 3.6    Lab on 10/30/2018   Component Date Value   • Copper 10/30/2018 178*   Consult on 10/19/2018   Component Date Value   • Iron 10/19/2018 17*   • TIBC 10/19/2018 268    • Iron Saturation 10/19/2018 6*   • Ferritin 10/19/2018 86.00    • Copper 10/19/2018 188*   • Zinc 10/19/2018 56    • Tissue Transglutaminase * 10/19/2018 <2    • Reticulocyte % 10/19/2018 1.23    • Reticulocyte Absolute 10/19/2018 0.0439    • LDH 10/19/2018 193    • Haptoglobin 10/19/2018 298*   • WBC 10/19/2018 7.11    • RBC 10/19/2018 3.57*   • Hemoglobin 10/19/2018 9.0*   • Hematocrit 10/19/2018 29.5*   • MCV 10/19/2018 82.6    • MCH 10/19/2018 25.2*   • MCHC 10/19/2018 30.5*   • RDW 10/19/2018 14.9*   • RDW-SD 10/19/2018 45.0    • MPV 10/19/2018 10.7*   • Platelets 10/19/2018 294    • Neutrophil % 10/19/2018 77.7*   • Lymphocyte % 10/19/2018 12.7*   • Monocyte % 10/19/2018 7.7    • Eosinophil % 10/19/2018 1.5    • Basophil % 10/19/2018 0.1    • Immature Grans % 10/19/2018 0.3    • Neutrophils, Absolute 10/19/2018 5.52    • Lymphocytes, Absolute 10/19/2018 0.90*   • Monocytes, Absolute 10/19/2018 0.55    • Eosinophils, Absolute 10/19/2018 0.11    • Basophils, Absolute 10/19/2018 0.01    • Immature Grans, Absolute 10/19/2018 0.02         PATHOLOGY:  11/29/18: Peripheral Smear:        11/29/18:                          Assessment/Plan   Cari Harper is a very pleasant 78 y.o.  female who presents in follow up appointment for further management and  evaluation of normocytic and iron deficiency anemia.     Normocytic anemia  Iron deficiency  - We spent a great deal of time during her appointment today discussing the differential for anemia and treatment for various diagnosis.  - She continues to have an iron deficiency despite oral iron therefore I suspect she likely has malabsorption, possibly due to PPI, and therefore she was started on IV injectafer. Tissue transglutaminase level and zinc were obtained to further evaluate iron deficiency and were normal. However copper level was found to be elevated and and further discussed below.   - B12 and folate levels show repletion  - LDH and retic count are normal thus less likely to be hemolysis, haptoglobin in fact elevated  - Today her iron levels are consistent with anemia of chronic inflammation which would also go along with elevation in CRP and ESR and could be due to recent surgery.  - Given the ongoing anemia I did also obtain a peripheral smear which was significant for a normocytic anemia with frequent microcytes suggestive of underlying iron deficiency.  - SPEP and serum free light chains were abnormal and further discussed below.    Monoclonal gammopathy of undetermined significance (MGUS)  - SPEP was significant for an abnormal immunofixation with an IgG monoclonal protein with lambda light specificity with an M-spike elevation at 0.3g/dL. While kappa light chain was minimally elevated the ratio was within normal limits. Will repeat serum studies along with 24 hour UPEP and urine light chain studies as well as a skeletal survey.  - I suspect that she likely has an MGUS based on her serum studies however given her ongoing anemia I had a long discussion with her and her son that she would require a bone marrow biopsy to further assess MGUS versus multiple myeloma. We also spent some time discussing bone marrow biopsy and aspirate procedure as well as its risks.  - I had a long discussion with the patient  and her son regarding the diagnosis and prognosis of MGUS as well as multiple myeloma and possible treatment options.    Copper elevation  - She was advised to discontinue any vitamins containing Copper. She denies of any abnormal water contamination and drinks city, filtered water.  - This was repeated and shows ongoing elevation along with copper in the urine therefore will refer to gastroenterology for further evaluation.     O Quality measures  - Cari Harper does not use tobacco products.  - BMI is above normal parameters. She has been trying to lose weight and has lost twenty pounds since the beginning of last year per her son.  - Current outpatient and discharge medications have been reconciled for the patient.  Reviewed by: Inés Hall MD      I will have the patient return in follow up appointment to review test results in one month. She understands that should she have any questions or concerns prior to her appointment she should give us a call at any time and I would be happy to see her sooner. It was a pleasure to see this patient in clinic today, thank you for allowing me to participate in the care of this patient.        Inés Hall MD  01/10/2019  1:05 PM

## 2019-01-10 ENCOUNTER — OFFICE VISIT (OUTPATIENT)
Dept: ONCOLOGY | Facility: CLINIC | Age: 79
End: 2019-01-10

## 2019-01-10 VITALS
OXYGEN SATURATION: 99 % | HEART RATE: 60 BPM | DIASTOLIC BLOOD PRESSURE: 54 MMHG | RESPIRATION RATE: 18 BRPM | SYSTOLIC BLOOD PRESSURE: 135 MMHG | TEMPERATURE: 97.9 F

## 2019-01-10 DIAGNOSIS — R78.79 HIGH BLOOD COPPER LEVEL: ICD-10-CM

## 2019-01-10 DIAGNOSIS — D50.9 IRON DEFICIENCY ANEMIA, UNSPECIFIED IRON DEFICIENCY ANEMIA TYPE: Primary | ICD-10-CM

## 2019-01-10 DIAGNOSIS — D47.2 MGUS (MONOCLONAL GAMMOPATHY OF UNKNOWN SIGNIFICANCE): ICD-10-CM

## 2019-01-10 DIAGNOSIS — D64.9 NORMOCYTIC ANEMIA: ICD-10-CM

## 2019-01-10 LAB
ALBUMIN SERPL-MCNC: 4.1 G/DL (ref 3.4–4.8)
ALBUMIN/GLOB SERPL: 1.4 G/DL (ref 1.5–2.5)
ALP SERPL-CCNC: 179 U/L (ref 35–104)
ALT SERPL W P-5'-P-CCNC: 16 U/L (ref 10–36)
ANION GAP SERPL CALCULATED.3IONS-SCNC: 7.3 MMOL/L (ref 3.6–11.2)
AST SERPL-CCNC: 21 U/L (ref 10–30)
BASOPHILS # BLD AUTO: 0.03 10*3/MM3 (ref 0–0.3)
BASOPHILS NFR BLD AUTO: 0.4 % (ref 0–2)
BILIRUB SERPL-MCNC: 0.3 MG/DL (ref 0.2–1.8)
BUN BLD-MCNC: 22 MG/DL (ref 7–21)
BUN/CREAT SERPL: 25.3 (ref 7–25)
CALCIUM SPEC-SCNC: 9.6 MG/DL (ref 7.7–10)
CHLORIDE SERPL-SCNC: 102 MMOL/L (ref 99–112)
CHROMATIN AB SERPL-ACNC: 9 IU/ML (ref 0–14)
CO2 SERPL-SCNC: 29.7 MMOL/L (ref 24.3–31.9)
CREAT BLD-MCNC: 0.87 MG/DL (ref 0.43–1.29)
CRP SERPL-MCNC: 7.42 MG/DL (ref 0–0.99)
CYTOLOGIST CVX/VAG CYTO: NORMAL
DEPRECATED RDW RBC AUTO: 48.7 FL (ref 37–54)
EOSINOPHIL # BLD AUTO: 0.1 10*3/MM3 (ref 0–0.7)
EOSINOPHIL NFR BLD AUTO: 1.4 % (ref 0–7)
ERYTHROCYTE [DISTWIDTH] IN BLOOD BY AUTOMATED COUNT: 15.3 % (ref 11.5–14.5)
FOLATE SERPL-MCNC: >24 NG/ML (ref 5.4–20)
GFR SERPL CREATININE-BSD FRML MDRD: 63 ML/MIN/1.73
GLOBULIN UR ELPH-MCNC: 3 GM/DL
GLUCOSE BLD-MCNC: 101 MG/DL (ref 70–110)
HCT VFR BLD AUTO: 31 % (ref 37–47)
HGB BLD-MCNC: 9.9 G/DL (ref 12–16)
IMM GRANULOCYTES # BLD AUTO: 0.01 10*3/MM3 (ref 0–0.03)
IMM GRANULOCYTES NFR BLD AUTO: 0.1 % (ref 0–0.5)
IRON 24H UR-MRATE: 30 MCG/DL (ref 49–151)
IRON SATN MFR SERPL: 13 % (ref 15–50)
LYMPHOCYTES # BLD AUTO: 0.91 10*3/MM3 (ref 1–3)
LYMPHOCYTES NFR BLD AUTO: 12.4 % (ref 16–46)
MCH RBC QN AUTO: 28 PG (ref 27–33)
MCHC RBC AUTO-ENTMCNC: 31.9 G/DL (ref 33–37)
MCV RBC AUTO: 87.8 FL (ref 80–94)
MONOCYTES # BLD AUTO: 0.7 10*3/MM3 (ref 0.1–0.9)
MONOCYTES NFR BLD AUTO: 9.5 % (ref 0–12)
NEUTROPHILS # BLD AUTO: 5.58 10*3/MM3 (ref 1.4–6.5)
NEUTROPHILS NFR BLD AUTO: 76.2 % (ref 40–75)
OSMOLALITY SERPL CALC.SUM OF ELEC: 281 MOSM/KG (ref 273–305)
PATH INTERP BLD-IMP: NORMAL
PLATELET # BLD AUTO: 278 10*3/MM3 (ref 130–400)
PMV BLD AUTO: 10.9 FL (ref 6–10)
POTASSIUM BLD-SCNC: 3.9 MMOL/L (ref 3.5–5.3)
PROT SERPL-MCNC: 7.1 G/DL (ref 6–8)
RBC # BLD AUTO: 3.53 10*6/MM3 (ref 4.2–5.4)
SODIUM BLD-SCNC: 139 MMOL/L (ref 135–153)
TIBC SERPL-MCNC: 228 MCG/DL (ref 241–421)
VIT B12 BLD-MCNC: 765 PG/ML (ref 211–911)
WBC NRBC COR # BLD: 7.33 10*3/MM3 (ref 4.5–12.5)

## 2019-01-10 PROCEDURE — 82525 ASSAY OF COPPER: CPT | Performed by: INTERNAL MEDICINE

## 2019-01-10 PROCEDURE — 86038 ANTINUCLEAR ANTIBODIES: CPT | Performed by: INTERNAL MEDICINE

## 2019-01-10 PROCEDURE — 82784 ASSAY IGA/IGD/IGG/IGM EACH: CPT | Performed by: INTERNAL MEDICINE

## 2019-01-10 PROCEDURE — 86334 IMMUNOFIX E-PHORESIS SERUM: CPT | Performed by: INTERNAL MEDICINE

## 2019-01-10 PROCEDURE — 83883 ASSAY NEPHELOMETRY NOT SPEC: CPT | Performed by: INTERNAL MEDICINE

## 2019-01-10 PROCEDURE — 80053 COMPREHEN METABOLIC PANEL: CPT | Performed by: INTERNAL MEDICINE

## 2019-01-10 PROCEDURE — 82607 VITAMIN B-12: CPT | Performed by: INTERNAL MEDICINE

## 2019-01-10 PROCEDURE — 99215 OFFICE O/P EST HI 40 MIN: CPT | Performed by: INTERNAL MEDICINE

## 2019-01-10 PROCEDURE — 86140 C-REACTIVE PROTEIN: CPT | Performed by: INTERNAL MEDICINE

## 2019-01-10 PROCEDURE — 83550 IRON BINDING TEST: CPT | Performed by: INTERNAL MEDICINE

## 2019-01-10 PROCEDURE — 84165 PROTEIN E-PHORESIS SERUM: CPT | Performed by: INTERNAL MEDICINE

## 2019-01-10 PROCEDURE — 84155 ASSAY OF PROTEIN SERUM: CPT | Performed by: INTERNAL MEDICINE

## 2019-01-10 PROCEDURE — 82390 ASSAY OF CERULOPLASMIN: CPT | Performed by: INTERNAL MEDICINE

## 2019-01-10 PROCEDURE — 82746 ASSAY OF FOLIC ACID SERUM: CPT | Performed by: INTERNAL MEDICINE

## 2019-01-10 PROCEDURE — 85025 COMPLETE CBC W/AUTO DIFF WBC: CPT | Performed by: INTERNAL MEDICINE

## 2019-01-10 PROCEDURE — 86431 RHEUMATOID FACTOR QUANT: CPT | Performed by: INTERNAL MEDICINE

## 2019-01-10 PROCEDURE — 83540 ASSAY OF IRON: CPT | Performed by: INTERNAL MEDICINE

## 2019-01-10 PROCEDURE — 84630 ASSAY OF ZINC: CPT | Performed by: INTERNAL MEDICINE

## 2019-01-10 PROCEDURE — 36415 COLL VENOUS BLD VENIPUNCTURE: CPT | Performed by: INTERNAL MEDICINE

## 2019-01-11 LAB
ALBUMIN SERPL-MCNC: 3.4 G/DL (ref 2.9–4.4)
ALBUMIN/GLOB SERPL: 1.1 {RATIO} (ref 0.7–1.7)
ALPHA1 GLOB FLD ELPH-MCNC: 0.4 G/DL (ref 0–0.4)
ALPHA2 GLOB SERPL ELPH-MCNC: 1 G/DL (ref 0.4–1)
ANA SER QL: NEGATIVE
B-GLOBULIN SERPL ELPH-MCNC: 1.1 G/DL (ref 0.7–1.3)
CERULOPLASMIN SERPL-MCNC: 40.4 MG/DL (ref 19–39)
GAMMA GLOB SERPL ELPH-MCNC: 0.8 G/DL (ref 0.4–1.8)
GLOBULIN SER CALC-MCNC: 3.4 G/DL (ref 2.2–3.9)
IGA SERPL-MCNC: 311 MG/DL (ref 64–422)
IGG SERPL-MCNC: 756 MG/DL (ref 700–1600)
IGM SERPL-MCNC: 81 MG/DL (ref 26–217)
INTERPRETATION SERPL IEP-IMP: NORMAL
KAPPA LC SERPL-MCNC: 23.8 MG/L (ref 3.3–19.4)
KAPPA LC/LAMBDA SER: 1.43 {RATIO} (ref 0.26–1.65)
LAMBDA LC FREE SERPL-MCNC: 16.7 MG/L (ref 5.7–26.3)
Lab: NORMAL
M-SPIKE: NORMAL G/DL
PROT SERPL-MCNC: 6.8 G/DL (ref 6–8.5)

## 2019-01-12 LAB
COPPER SERPL-MCNC: 154 UG/DL (ref 72–166)
ZINC SERPL-MCNC: 58 UG/DL (ref 56–134)

## 2019-01-15 PROCEDURE — 83883 ASSAY NEPHELOMETRY NOT SPEC: CPT | Performed by: INTERNAL MEDICINE

## 2019-01-15 PROCEDURE — 81050 URINALYSIS VOLUME MEASURE: CPT | Performed by: INTERNAL MEDICINE

## 2019-01-15 PROCEDURE — 86335 IMMUNFIX E-PHORSIS/URINE/CSF: CPT | Performed by: INTERNAL MEDICINE

## 2019-01-15 PROCEDURE — 84166 PROTEIN E-PHORESIS/URINE/CSF: CPT | Performed by: INTERNAL MEDICINE

## 2019-01-15 PROCEDURE — 84156 ASSAY OF PROTEIN URINE: CPT | Performed by: INTERNAL MEDICINE

## 2019-01-17 LAB
ALBUMIN 24H MFR UR ELPH: 26.9 %
ALPHA1 GLOB 24H MFR UR ELPH: 7.5 %
ALPHA2 GLOB 24H MFR UR ELPH: 19 %
B-GLOBULIN MFR UR ELPH: 30.4 %
GAMMA GLOB 24H MFR UR ELPH: 16.2 %
HIV 1 & 2 AB SER-IMP: NORMAL
INTERPRETATION UR IFE-IMP: NORMAL
M PROTEIN 24H MFR UR ELPH: NORMAL %
PROT 24H UR-MRATE: 70 MG/24 HR (ref 30–150)
PROT UR-MCNC: 5 MG/DL

## 2019-01-18 LAB
FREE KAPPA LT CHAINS, 24HR: 9 MG/24 HR
FREE LAMBDA LT CHAINS, 24 HR: 0 MG/24 HR
KAPPA LC UR-MCNC: 6.68 MG/L (ref 1.35–24.19)
KAPPA LC/LAMBDA UR: 23.03 {RATIO} (ref 2.04–10.37)
LAMBDA LC UR-MCNC: 0.29 MG/L (ref 0.24–6.66)

## 2019-02-14 ENCOUNTER — HOSPITAL ENCOUNTER (OUTPATIENT)
Dept: GENERAL RADIOLOGY | Facility: HOSPITAL | Age: 79
Discharge: HOME | End: 2019-02-14
Admitting: INTERNAL MEDICINE

## 2019-02-14 DIAGNOSIS — D47.2 MGUS (MONOCLONAL GAMMOPATHY OF UNKNOWN SIGNIFICANCE): ICD-10-CM

## 2019-02-14 PROCEDURE — 77075 RADEX OSSEOUS SURVEY COMPL: CPT

## 2019-02-14 PROCEDURE — 77075 RADEX OSSEOUS SURVEY COMPL: CPT | Performed by: RADIOLOGY

## 2019-02-21 ENCOUNTER — OFFICE VISIT (OUTPATIENT)
Dept: ONCOLOGY | Facility: CLINIC | Age: 79
End: 2019-02-21

## 2019-02-21 VITALS
TEMPERATURE: 97.8 F | RESPIRATION RATE: 18 BRPM | DIASTOLIC BLOOD PRESSURE: 68 MMHG | HEART RATE: 61 BPM | SYSTOLIC BLOOD PRESSURE: 161 MMHG | OXYGEN SATURATION: 98 %

## 2019-02-21 DIAGNOSIS — D64.9 NORMOCYTIC ANEMIA: Primary | ICD-10-CM

## 2019-02-21 DIAGNOSIS — D47.2 MGUS (MONOCLONAL GAMMOPATHY OF UNKNOWN SIGNIFICANCE): ICD-10-CM

## 2019-02-21 DIAGNOSIS — R78.79 HIGH BLOOD COPPER LEVEL: ICD-10-CM

## 2019-02-21 DIAGNOSIS — D50.9 IRON DEFICIENCY ANEMIA, UNSPECIFIED IRON DEFICIENCY ANEMIA TYPE: ICD-10-CM

## 2019-02-21 LAB
BASOPHILS # BLD AUTO: 0 10*3/MM3 (ref 0–0.3)
BASOPHILS NFR BLD AUTO: 0 % (ref 0–2)
DEPRECATED RDW RBC AUTO: 43.3 FL (ref 37–54)
EOSINOPHIL # BLD AUTO: 0 10*3/MM3 (ref 0–0.7)
EOSINOPHIL NFR BLD AUTO: 0 % (ref 0–7)
ERYTHROCYTE [DISTWIDTH] IN BLOOD BY AUTOMATED COUNT: 13.5 % (ref 11.5–14.5)
FERRITIN SERPL-MCNC: 463 NG/ML (ref 10–290.3)
HCT VFR BLD AUTO: 34.1 % (ref 37–47)
HGB BLD-MCNC: 10.8 G/DL (ref 12–16)
IMM GRANULOCYTES # BLD AUTO: 0.01 10*3/MM3 (ref 0–0.03)
IMM GRANULOCYTES NFR BLD AUTO: 0.1 % (ref 0–0.5)
IRON 24H UR-MRATE: 113 MCG/DL (ref 49–151)
IRON SATN MFR SERPL: 45 % (ref 15–50)
LYMPHOCYTES # BLD AUTO: 0.8 10*3/MM3 (ref 1–3)
LYMPHOCYTES NFR BLD AUTO: 9.9 % (ref 16–46)
MCH RBC QN AUTO: 28.1 PG (ref 27–33)
MCHC RBC AUTO-ENTMCNC: 31.7 G/DL (ref 33–37)
MCV RBC AUTO: 88.8 FL (ref 80–94)
MONOCYTES # BLD AUTO: 0.81 10*3/MM3 (ref 0.1–0.9)
MONOCYTES NFR BLD AUTO: 10 % (ref 0–12)
NEUTROPHILS # BLD AUTO: 6.5 10*3/MM3 (ref 1.4–6.5)
NEUTROPHILS NFR BLD AUTO: 80 % (ref 40–75)
PLATELET # BLD AUTO: 341 10*3/MM3 (ref 130–400)
PMV BLD AUTO: 11.1 FL (ref 6–10)
RBC # BLD AUTO: 3.84 10*6/MM3 (ref 4.2–5.4)
TIBC SERPL-MCNC: 251 MCG/DL (ref 241–421)
WBC NRBC COR # BLD: 8.12 10*3/MM3 (ref 4.5–12.5)

## 2019-02-21 PROCEDURE — 85025 COMPLETE CBC W/AUTO DIFF WBC: CPT | Performed by: INTERNAL MEDICINE

## 2019-02-21 PROCEDURE — 83550 IRON BINDING TEST: CPT | Performed by: INTERNAL MEDICINE

## 2019-02-21 PROCEDURE — 99214 OFFICE O/P EST MOD 30 MIN: CPT | Performed by: INTERNAL MEDICINE

## 2019-02-21 PROCEDURE — 82728 ASSAY OF FERRITIN: CPT | Performed by: INTERNAL MEDICINE

## 2019-02-21 PROCEDURE — 83540 ASSAY OF IRON: CPT | Performed by: INTERNAL MEDICINE

## 2019-02-21 NOTE — PROGRESS NOTES
Cari Harper  [id number]  1940  2/21/2019      Referring Provider:   ALYSA Arevalo    Reason for Follow Up:   Normocytic anemia  Iron deficiency anemia  Monoclonal gammopathy of undetermined significance (MGUS)     Chief Complaint:  Fatigue      History of Present Illness   Cari Harper is a very pleasant 78 y.o.  female who presents in follow up appointment for further management and evaluation of normocytic anemia.    During her initial appointment Ms. Harper believed that her hemoglobin had been worsening for six months prior to her appointment with me. In review of her laboratory evaluation it appeared that she has had stable anemia with a hemoglobin in the 9 range since 2016. She had been more fatigued and dyspneic and recently underwent shoulder replacement surgery November 2018 and initially began following with me to help optimize her hemoglobin prior to surgery. She has never required a blood transfusion, and has been on oral ferrous sulfate 325mg once daily for one year and tolerating without any issues. She is also on Eliquis for history of atrial fibrillation and deep venous thrombosis several years ago. She denies of any abnormal or unusual bleeding. She believed her last colonoscopy was 2 or 3 years ago and was a routine colonoscopy and was normal as per family. She has continued to have stable anemia despite IV iron replacement. In workup for iron deficiency she was also found to have an elevated copper level despite stopping all multivitamins.      Treatment History:  IV injectafer: 10/23, 10/30/18      Interim History:  She is currently not on oral iron as she has intermittently been receiving IV iron. She recently received a steroid injection to her knee which has significantly improved her mobility. She also continues to work with physical therapy and her range of motion has improved in her shoulder. She denies of any unusual or new bone pain. She has not yet seen  gastroenterology for iron deficiency or elevated copper level. She denies of any bleeding.         The following portions of the patient's history were reviewed and updated as appropriate: allergies, current medications, past family history, past medical history, past social history, past surgical history and problem list.    No Known Allergies      Past Medical History:   Diagnosis Date   • Anxiety    • Arthritis    • Asthma    • Atrial fibrillation (CMS/HCC)    • Coronary artery disease    • Depression    • Disease of thyroid gland    • DVT (deep venous thrombosis) (CMS/HCC)    • Eczema    • GERD (gastroesophageal reflux disease)    • HLD (hyperlipidemia) 2018   • On home oxygen therapy     2L at night   • Wears dentures    • Wears glasses    Denies of hypertension    Past Surgical History:   Procedure Laterality Date   • APPENDECTOMY     • CHOLECYSTECTOMY     • COLONOSCOPY     • COLONOSCOPY W/ BIOPSIES     • EYE SURGERY      cataracts   • TOTAL SHOULDER ARTHROPLASTY W/ DISTAL CLAVICLE EXCISION Right 2018    Procedure: RIGHT REVERSE TOTAL SHOULDER REPLACEMENT;  Surgeon: Robbie Silveira MD;  Location: [facility];  Service: Orthopedics   • TUBAL ABDOMINAL LIGATION         Social History     Socioeconomic History   • Marital status: Unknown     Spouse name: Not on file   • Number of children: Not on file   • Years of education: Not on file   • Highest education level: Not on file   Social Needs   • Financial resource strain: Not on file   • Food insecurity - worry: Not on file   • Food insecurity - inability: Not on file   • Transportation needs - medical: Not on file   • Transportation needs - non-medical: Not on file   Occupational History   • Not on file   Tobacco Use   • Smoking status: Former Smoker     Packs/day: 1.00     Years: 0.00     Pack years: 0.00     Types: Cigarettes     Start date: 1961     Last attempt to quit: 1986     Years since quittin.5   • Smokeless tobacco: Never Used    Substance and Sexual Activity   • Alcohol use: No   • Drug use: No   • Sexual activity: Defer   Other Topics Concern   • Not on file   Social History Narrative   • Not on file   She is , lives by herself but her children live next to her. She was a previous smoker for 20-30 years and quit 30 years ago. No alcohol or illicit drug use. She is a .         Family History   Problem Relation Age of Onset   • Cancer Father    • No Known Problems Mother    Son -  at age of 49 from Sinus cancer    Son -  from [facility]  Sister - brain tumor       Current Outpatient Medications:   •  acetaminophen (TYLENOL) 500 MG tablet, Take 500 mg by mouth Every 6 (Six) Hours As Needed for Mild Pain ., Disp: , Rfl:   •  apixaban (ELIQUIS) 5 MG tablet tablet, Take 1 tablet by mouth Every 12 (Twelve) Hours., Disp: 60 tablet, Rfl: 5  •  budesonide-formoterol (SYMBICORT) 160-4.5 MCG/ACT inhaler, Inhale 2 puffs 2 (Two) Times a Day., Disp: , Rfl:   •  bumetanide (BUMEX) 2 MG tablet, Take 1 tablet by mouth Daily., Disp: 30 tablet, Rfl: 11  •  clonazePAM (KlonoPIN) 0.5 MG tablet, Take 0.5 mg by mouth Daily As Needed., Disp: , Rfl:   •  clonazePAM (KlonoPIN) 1 MG tablet, Take 1 mg by mouth Every Night., Disp: , Rfl:   •  docusate sodium 100 MG capsule, Take 100 mg by mouth 2 (Two) Times a Day As Needed for Constipation. Over the counter, Disp: , Rfl:   •  ferrous sulfate 325 (65 FE) MG tablet, Take 325 mg by mouth 2 (Two) Times a Day., Disp: , Rfl:   •  flecainide (TAMBOCOR) 50 MG tablet, Take 1 tablet by mouth Every 12 (Twelve) Hours., Disp: 60 tablet, Rfl: 11  •  FLUoxetine (PROzac) 20 MG capsule, Take 20 mg by mouth 2 (two) times a day., Disp: , Rfl:   •  loratadine (CLARITIN) 10 MG tablet, Take 10 mg by mouth Daily., Disp: , Rfl:   •  metaxalone (SKELAXIN) 800 MG tablet, Take 1 tablet by mouth Every 6 (Six) Hours As Needed for Muscle Spasms., Disp: 12 tablet, Rfl: 0  •  nitrofurantoin,  macrocrystal-monohydrate, (MACROBID) 100 MG capsule, Take 1 capsule by mouth Every 12 (Twelve) Hours., Disp: 10 capsule, Rfl: 0  •  O2 (OXYGEN), Inhale 2 L/min Every Night., Disp: , Rfl:   •  Omega-3 Fatty Acids (FISH OIL) 1200 MG capsule capsule, Take 2,400 mg by mouth Every Evening., Disp: , Rfl:   •  omeprazole (priLOSEC) 20 MG capsule, Take 20 mg by mouth 2 (Two) Times a Day., Disp: , Rfl:   •  oxyCODONE-acetaminophen (PERCOCET) 5-325 MG per tablet, Take 1-2 tablets by mouth Every 4-6 Hours As Needed for post-op pain, Disp: 60 tablet, Rfl: 0  •  potassium chloride (K-DUR) 10 MEQ CR tablet, Take 1 tablet by mouth Daily., Disp: 30 tablet, Rfl: 11  •  Thyroid 60 MG PO tablet, Take 60 mg by mouth Every Morning., Disp: , Rfl:         Review of Systems  A comprehensive 14 point review of systems was performed.  Significant findings as mentioned above.  All other systems reviewed and are negative.        Physical Exam  Vital Signs: These were reviewed and listed as per patient’s electronic medical chart  Vitals:    02/21/19 1311   BP: 161/68   Pulse: 61   Resp: 18   Temp: 97.8 °F (36.6 °C)   SpO2: 98%     General: Awake, alert and oriented, in no distress, elderly, in a wheelchair  HEENT: Head is atraumatic, normocephalic, extraocular movements full, oropharynx clear, no scleral icterus, pink moist mucous membranes  Neck: supple, no jvd, lymphadenopathy or masses  Cardiovascular: regular rate and rhythm without murmurs, rubs or gallops  Pulmonary: non-labored, clear to auscultation bilaterally, no wheezing  Abdomen: soft, non-tender, non-distended, normal active bowel sounds present  Extremities: No clubbing, cyanosis or edema  Lymph: No cervical, supraclavicular adenopathy  Neurologic: Mental status as above, alert, awake and oriented, grossly non-focal exam, in a wheelchair  Skin: warm, dry, intact  Patient was examined on 02/21/19 and changes are reflected and noted above.      Labs / Studies:    Office Visit on  01/10/2019   Component Date Value   • Copper 01/10/2019 154    • Zinc 01/10/2019 58    • Ceruloplasmin 01/10/2019 40.4*   • Iron 01/10/2019 30*   • TIBC 01/10/2019 228*   • Iron Saturation 01/10/2019 13*   • Vitamin B-12 01/10/2019 765    • Folate 01/10/2019 >24.00*   • C-Reactive Protein 01/10/2019 7.42*   • IgG 01/10/2019 756    • IgA 01/10/2019 311    • IgM 01/10/2019 81    • Total Protein 01/10/2019 6.8    • Albumin 01/10/2019 3.4    • Alpha-1-Globulin 01/10/2019 0.4    • Alpha-2-Globulin 01/10/2019 1.0    • Beta Globulin 01/10/2019 1.1    • Gamma Globulin 01/10/2019 0.8    • M-Jame 01/10/2019 Not Observed    • Globulin 01/10/2019 3.4    • A/G Ratio 01/10/2019 1.1    • Immunofixation Reflex, S* 01/10/2019 Comment:    • Please note 01/10/2019 Comment    • Free Kappa Lt Chains,Ur 01/15/2019 6.68    • Free Kappa Lt Chains, 24* 01/15/2019 9    • Free Lambda Lt Chains,Ur 01/15/2019 0.29    • Free Lambda Lt Chains, 2* 01/15/2019 0    • Kappa/Lambda Ratio,U 01/15/2019 23.03*   • Total Protein, Urine 01/15/2019 5.0    • Protein, 24H Urine 01/15/2019 70    • Albumin, U 01/15/2019 26.9    • Alpha-1-Globulin, U 01/15/2019 7.5    • Alpha-2-Globulin, U 01/15/2019 19.0    • Beta Globulin, U 01/15/2019 30.4    • Gamma Globulin, Urine 01/15/2019 16.2    • M-Jame, % U 01/15/2019 Not Observed    • Immunofixation Result, U* 01/15/2019 Comment    • Note: 01/15/2019 Comment    • Free Light Chain, Kappa 01/10/2019 23.8*   • Free Lambda Light Chains 01/10/2019 16.7    • Kappa/Lambda Ratio 01/10/2019 1.43    • Rheumatoid Factor Quanti* 01/10/2019 9.0    • CONNIE Direct 01/10/2019 Negative    • Glucose 01/10/2019 101    • BUN 01/10/2019 22*   • Creatinine 01/10/2019 0.87    • Sodium 01/10/2019 139    • Potassium 01/10/2019 3.9    • Chloride 01/10/2019 102    • CO2 01/10/2019 29.7    • Calcium 01/10/2019 9.6    • Total Protein 01/10/2019 7.1    • Albumin 01/10/2019 4.10    • ALT (SGPT) 01/10/2019 16    • AST (SGOT) 01/10/2019 21    •  Alkaline Phosphatase 01/10/2019 179*   • Total Bilirubin 01/10/2019 0.3    • eGFR Non  Amer 01/10/2019 63    • Globulin 01/10/2019 3.0    • A/G Ratio 01/10/2019 1.4*   • BUN/Creatinine Ratio 01/10/2019 25.3*   • Anion Gap 01/10/2019 7.3    • WBC 01/10/2019 7.33    • RBC 01/10/2019 3.53*   • Hemoglobin 01/10/2019 9.9*   • Hematocrit 01/10/2019 31.0*   • MCV 01/10/2019 87.8    • MCH 01/10/2019 28.0    • MCHC 01/10/2019 31.9*   • RDW 01/10/2019 15.3*   • RDW-SD 01/10/2019 48.7    • MPV 01/10/2019 10.9*   • Platelets 01/10/2019 278    • Neutrophil % 01/10/2019 76.2*   • Lymphocyte % 01/10/2019 12.4*   • Monocyte % 01/10/2019 9.5    • Eosinophil % 01/10/2019 1.4    • Basophil % 01/10/2019 0.4    • Immature Grans % 01/10/2019 0.1    • Neutrophils, Absolute 01/10/2019 5.58    • Lymphocytes, Absolute 01/10/2019 0.91*   • Monocytes, Absolute 01/10/2019 0.70    • Eosinophils, Absolute 01/10/2019 0.10    • Basophils, Absolute 01/10/2019 0.03    • Immature Grans, Absolute 01/10/2019 0.01    • Osmolality Calc 01/10/2019 281.0    Office Visit on 11/29/2018   Component Date Value   • Iron 11/29/2018 38*   • TIBC 11/29/2018 238*   • Iron Saturation 11/29/2018 16    • Ferritin 11/29/2018 967.00*   • Copper 11/29/2018 173*   • Zinc 11/29/2018 64    • Ceruloplasmin 11/29/2018 43.3*   • Copper, urine 12/04/2018 12    • Creatinine, Urine 12/04/2018 0.58    • Copper /G Creat 12/04/2018 21    • Vitamin B-12 11/29/2018 1,216*   • Folate 11/29/2018 >24.00*   • Sed Rate 11/29/2018 88*   • C-Reactive Protein 11/29/2018 6.44*   • Performed by: 11/29/2018 Dr amarilys ashley    • Pathologist Interpretati* 11/29/2018 See Scanned Report      • IgG 11/29/2018 726    • IgA 11/29/2018 334    • IgM 11/29/2018 82    • Total Protein 11/29/2018 6.5    • Albumin 11/29/2018 3.1    • Alpha-1-Globulin 11/29/2018 0.4    • Alpha-2-Globulin 11/29/2018 1.0    • Beta Globulin 11/29/2018 1.2    • Gamma Globulin 11/29/2018 0.8    • M-Jame 11/29/2018 0.3*    • Globulin 11/29/2018 3.4    • A/G Ratio 11/29/2018 1.0    • Immunofixation Reflex, S* 11/29/2018 Comment    • Please note 11/29/2018 Comment    • Free Light Chain, Kappa 11/29/2018 21.4*   • Free Lambda Light Chains 11/29/2018 19.0    • Kappa/Lambda Ratio 11/29/2018 1.13    • WBC 11/29/2018 7.25    • RBC 11/29/2018 3.67*   • Hemoglobin 11/29/2018 10.0*   • Hematocrit 11/29/2018 31.7*   • MCV 11/29/2018 86.4    • MCH 11/29/2018 27.2    • MCHC 11/29/2018 31.5*   • RDW 11/29/2018 17.7*   • RDW-SD 11/29/2018 56.2*   • MPV 11/29/2018 10.7*   • Platelets 11/29/2018 286    • Neutrophil % 11/29/2018 74.2    • Lymphocyte % 11/29/2018 12.4*   • Monocyte % 11/29/2018 9.7    • Eosinophil % 11/29/2018 3.3    • Basophil % 11/29/2018 0.3    • Immature Grans % 11/29/2018 0.1    • Neutrophils, Absolute 11/29/2018 5.38    • Lymphocytes, Absolute 11/29/2018 0.90*   • Monocytes, Absolute 11/29/2018 0.70    • Eosinophils, Absolute 11/29/2018 0.24    • Basophils, Absolute 11/29/2018 0.02    • Immature Grans, Absolute 11/29/2018 0.01    Admission on 11/12/2018, Discharged on 11/13/2018   Component Date Value   • ABO Type 11/12/2018 B    • RH type 11/12/2018 Negative    • Glucose 11/13/2018 124*   • BUN 11/13/2018 15    • Creatinine 11/13/2018 0.75    • Sodium 11/13/2018 135    • Potassium 11/13/2018 3.6    • Chloride 11/13/2018 101    • CO2 11/13/2018 27.0    • Calcium 11/13/2018 8.8    • eGFR Non  Amer 11/13/2018 75    • BUN/Creatinine Ratio 11/13/2018 20.0    • Anion Gap 11/13/2018 7.0    • WBC 11/13/2018 6.86    • RBC 11/13/2018 3.84*   • Hemoglobin 11/13/2018 10.1*   • Hematocrit 11/13/2018 32.7*   • MCV 11/13/2018 85.2    • MCH 11/13/2018 26.3*   • MCHC 11/13/2018 30.9*   • RDW 11/13/2018 17.2*   • RDW-SD 11/13/2018 53.4    • MPV 11/13/2018 10.6    • Platelets 11/13/2018 241    • Neutrophil % 11/13/2018 86.7*   • Lymphocyte % 11/13/2018 7.9*   • Monocyte % 11/13/2018 5.4    • Eosinophil % 11/13/2018 0.0    • Basophil %  11/13/2018 0.0    • Immature Grans % 11/13/2018 0.1    • Neutrophils, Absolute 11/13/2018 5.95    • Lymphocytes, Absolute 11/13/2018 0.54*   • Monocytes, Absolute 11/13/2018 0.37    • Eosinophils, Absolute 11/13/2018 0.00    • Basophils, Absolute 11/13/2018 0.00    • Immature Grans, Absolute 11/13/2018 0.01    • Color, UA 11/13/2018 Yellow    • Appearance, UA 11/13/2018 Clear    • pH, UA 11/13/2018 7.0    • Specific Gravity, UA 11/13/2018 1.010    • Glucose, UA 11/13/2018 Negative    • Ketones, UA 11/13/2018 Negative    • Bilirubin, UA 11/13/2018 Negative    • Blood, UA 11/13/2018 Negative    • Protein, UA 11/13/2018 Negative    • Leuk Esterase, UA 11/13/2018 Trace*   • Nitrite, UA 11/13/2018 Negative    • Urobilinogen, UA 11/13/2018 0.2 E.U./dL    • RBC, UA 11/13/2018 None Seen    • WBC, UA 11/13/2018 6-12*   • Bacteria, UA 11/13/2018 None Seen    • Squamous Epithelial Cell* 11/13/2018 0-2    • Hyaline Casts, UA 11/13/2018 0-6    • Methodology 11/13/2018 Automated Microscopy    • Urine Culture 11/13/2018 No growth at 2 days    Appointment on 11/11/2018   Component Date Value   • Hemoglobin A1C 11/11/2018 5.20    • WBC 11/11/2018 6.73    • RBC 11/11/2018 4.09    • Hemoglobin 11/11/2018 10.8*   • Hematocrit 11/11/2018 34.6    • MCV 11/11/2018 84.6    • MCH 11/11/2018 26.4*   • MCHC 11/11/2018 31.2*   • RDW 11/11/2018 17.4*   • RDW-SD 11/11/2018 54.5*   • MPV 11/11/2018 10.8    • Platelets 11/11/2018 254    • ABO Type 11/11/2018 B    • RH type 11/11/2018 Negative    • Antibody Screen 11/11/2018 Negative    • T&S Expiration Date 11/11/2018 11/14/2018 11:59:59 PM    • Potassium 11/11/2018 3.6    Lab on 10/30/2018   Component Date Value   • Copper 10/30/2018 178*   Consult on 10/19/2018   Component Date Value   • Iron 10/19/2018 17*   • TIBC 10/19/2018 268    • Iron Saturation 10/19/2018 6*   • Ferritin 10/19/2018 86.00    • Copper 10/19/2018 188*   • Zinc 10/19/2018 56    • Tissue Transglutaminase * 10/19/2018 <2    •  Reticulocyte % 10/19/2018 1.23    • Reticulocyte Absolute 10/19/2018 0.0439    • LDH 10/19/2018 193    • Haptoglobin 10/19/2018 298*   • WBC 10/19/2018 7.11    • RBC 10/19/2018 3.57*   • Hemoglobin 10/19/2018 9.0*   • Hematocrit 10/19/2018 29.5*   • MCV 10/19/2018 82.6    • MCH 10/19/2018 25.2*   • MCHC 10/19/2018 30.5*   • RDW 10/19/2018 14.9*   • RDW-SD 10/19/2018 45.0    • MPV 10/19/2018 10.7*   • Platelets 10/19/2018 294    • Neutrophil % 10/19/2018 77.7*   • Lymphocyte % 10/19/2018 12.7*   • Monocyte % 10/19/2018 7.7    • Eosinophil % 10/19/2018 1.5    • Basophil % 10/19/2018 0.1    • Immature Grans % 10/19/2018 0.3    • Neutrophils, Absolute 10/19/2018 5.52    • Lymphocytes, Absolute 10/19/2018 0.90*   • Monocytes, Absolute 10/19/2018 0.55    • Eosinophils, Absolute 10/19/2018 0.11    • Basophils, Absolute 10/19/2018 0.01    • Immature Grans, Absolute 10/19/2018 0.02         PATHOLOGY:  18: Peripheral Smear:        18:              01/10/19:                        IMAGIN19: XR BONE SURVEY COMPLETE: Lytic lesions: None. Alignment: The bones are in normal anatomic alignment. There is no evidence of fracture or dislocation. Mineralization: Normal Joints: Right humeral arthroplasty. Degenerative changes. Degenerative disc disease: Degenerative changes of the cervical, thoracic, and lumbar spines. Other findings: None IMPRESSION: Degenerative changes. No lytic bony lesions identified.               Assessment/Plan   Cari Harper is a very pleasant 78 y.o.  female who presents in follow up appointment for further management and evaluation of normocytic and iron deficiency anemia.     Normocytic anemia  Iron deficiency  - We spent a great deal of time during her appointment previously discussing the differential for anemia and treatment for various diagnosis.  - She continues to have an iron deficiency despite oral iron therefore I suspect she likely has malabsorption, possibly due to  PPI, and therefore she was started on IV injectafer. Tissue transglutaminase level and zinc were obtained to further evaluate iron deficiency and were normal. However copper level was found to be elevated and and further discussed below.   - B12 and folate levels show repletion  - LDH and retic count are normal thus less likely to be hemolysis, haptoglobin in fact elevated  - Today her iron levels are consistent with anemia of chronic inflammation which would also go along with elevation in CRP and ESR and could be due to recent surgery.  - Given the ongoing anemia I did also obtain a peripheral smear which was significant for a normocytic anemia with frequent microcytes suggestive of underlying iron deficiency. Iron levels are normal today with improving/stable hemoglobin.  - SPEP and serum free light chains were abnormal and further discussed below.    Monoclonal gammopathy of undetermined significance (MGUS)  - SPEP was significant for an abnormal immunofixation with an IgG monoclonal protein with lambda light specificity with an M-spike elevation at 0.3g/dL. While kappa light chain was minimally elevated the ratio was within normal limits.   - On repeat testing no M spike was seen on most recent SPEP. UPEP was essentially normal and presence of monoclonal protein that was previously seen is now unclear. While kappa light chain was mildly elevated the serum ratio was within normal limits. Skeletal survey was significant for degenerative changes otherwise no lytic bony lesions seen.  - I suspect that she likely has an MGUS based on her serum studies however given her ongoing anemia I again had a long discussion with her and her sons that she would require a bone marrow biopsy to further assess the normocytic anemia and MGUS. We previously discussed bone marrow biopsy and aspirate procedure as well as its risks. She discussed this with her family and does not want to pursue bone marrow biopsy and would prefer to  monitor her studies.  - Therefore will continue to monitor MGUS serum studies every 6 months.    Copper elevation  - She was advised to discontinue any vitamins containing Copper. She denies of any abnormal water contamination and drinks city, filtered water.  - This was repeated and shows ongoing elevation along with copper in the urine therefore she has been referred to gastroenterology for further evaluation.     O Quality measures  - Cari Harper does not use tobacco products.  - BMI is above normal parameters. She has been trying to lose weight and previously had lost twenty pounds since the beginning of last year per her son.  - Current outpatient and discharge medications have been reconciled for the patient.  Reviewed by: Inés Hall MD      I will have the patient return for laboratory evaluation in 3 months and in follow up appointment in 6 months with labs one week prior. She understands that should she have any questions or concerns prior to her appointment she should give us a call at any time and I would be happy to see her sooner. It was a pleasure to see this patient in clinic today, thank you for allowing me to participate in the care of this patient.        Inés Hall MD  02/21/2019  1:17 PM

## 2019-03-07 ENCOUNTER — OFFICE VISIT (OUTPATIENT)
Dept: CARDIOLOGY | Facility: CLINIC | Age: 79
End: 2019-03-07

## 2019-03-07 VITALS
DIASTOLIC BLOOD PRESSURE: 61 MMHG | HEART RATE: 68 BPM | SYSTOLIC BLOOD PRESSURE: 113 MMHG | BODY MASS INDEX: 33 KG/M2 | HEIGHT: 61 IN | WEIGHT: 174.8 LBS | OXYGEN SATURATION: 97 %

## 2019-03-07 DIAGNOSIS — I48.0 PAROXYSMAL ATRIAL FIBRILLATION (HCC): Primary | ICD-10-CM

## 2019-03-07 DIAGNOSIS — I27.20 PULMONARY HTN (HCC): ICD-10-CM

## 2019-03-07 DIAGNOSIS — R06.02 SHORTNESS OF BREATH: ICD-10-CM

## 2019-03-07 PROCEDURE — 99213 OFFICE O/P EST LOW 20 MIN: CPT | Performed by: PHYSICIAN ASSISTANT

## 2019-03-07 NOTE — PROGRESS NOTES
Problem list     Subjective   Cari Harper is a 78 y.o. female     Chief Complaint   Patient presents with   • Atrial Fibrillation   • Shortness of Breath   Problem List:  1. Paroxysmal atrial fibrillation  2. Preserved systolic function  3. Low risk stress test with no evidence of ischemia.  4. Hypertension  5. Mild pulmonary hypertension, PA systolic pressures 40-45 mmHg  6. Dyslipidemia  7. Hypothyroidism  8. First degree AVB.    HPI  The patient presents in for routine evaluation follow-up.  Since last evaluation here, the patient tells me that she has continued to do fairly well from cardiovascular standpoint.  She has numerous complications related mostly to residual difficulties with right shoulder status post recent surgery, stress, anxiety, etc., but overall is doing very well from a cardiac standpoint.  She reports no chest pain.  She has chronic but stable dyspnea.  She has no dysrhythmic symptoms.  She has no dizziness or syncope of any significance at this time.  She denies failure symptoms.  Blood pressures remain well controlled.  She has no further complaints otherwise.    Current Outpatient Medications   Medication Sig Dispense Refill   • acetaminophen (TYLENOL) 500 MG tablet Take 500 mg by mouth Every 6 (Six) Hours As Needed for Mild Pain .     • budesonide-formoterol (SYMBICORT) 160-4.5 MCG/ACT inhaler Inhale 2 puffs 2 (Two) Times a Day.     • bumetanide (BUMEX) 2 MG tablet Take 1 tablet by mouth Daily. 30 tablet 11   • clonazePAM (KlonoPIN) 0.5 MG tablet Take 0.5 mg by mouth Daily As Needed.     • clonazePAM (KlonoPIN) 1 MG tablet Take 1 mg by mouth Every Night.     • docusate sodium 100 MG capsule Take 100 mg by mouth 2 (Two) Times a Day As Needed for Constipation. Over the counter     • ferrous sulfate 325 (65 FE) MG tablet Take 325 mg by mouth 2 (Two) Times a Day.     • flecainide (TAMBOCOR) 50 MG tablet Take 1 tablet by mouth Every 12 (Twelve) Hours. 60 tablet 11   • FLUoxetine (PROzac) 20  MG capsule Take 20 mg by mouth 2 (two) times a day.     • loratadine (CLARITIN) 10 MG tablet Take 10 mg by mouth Daily.     • metaxalone (SKELAXIN) 800 MG tablet Take 1 tablet by mouth Every 6 (Six) Hours As Needed for Muscle Spasms. 12 tablet 0   • nitrofurantoin, macrocrystal-monohydrate, (MACROBID) 100 MG capsule Take 1 capsule by mouth Every 12 (Twelve) Hours. 10 capsule 0   • O2 (OXYGEN) Inhale 2 L/min Every Night.     • Omega-3 Fatty Acids (FISH OIL) 1200 MG capsule capsule Take 2,400 mg by mouth Every Evening.     • omeprazole (priLOSEC) 20 MG capsule Take 20 mg by mouth 2 (Two) Times a Day.     • potassium chloride (K-DUR) 10 MEQ CR tablet Take 1 tablet by mouth Daily. 30 tablet 11   • Thyroid 60 MG PO tablet Take 60 mg by mouth Every Morning.     • rivaroxaban (XARELTO) 20 MG tablet Take 1 tablet by mouth Daily. 30 tablet 3     No current facility-administered medications for this visit.        Patient has no known allergies.    Past Medical History:   Diagnosis Date   • Anxiety    • Arthritis    • Asthma    • Atrial fibrillation (CMS/HCC)    • Coronary artery disease    • Depression    • Disease of thyroid gland    • DVT (deep venous thrombosis) (CMS/HCC)    • Eczema    • GERD (gastroesophageal reflux disease)    • HLD (hyperlipidemia) 11/12/2018   • On home oxygen therapy     2L at night   • Wears dentures    • Wears glasses        Social History     Socioeconomic History   • Marital status: Unknown     Spouse name: Not on file   • Number of children: Not on file   • Years of education: Not on file   • Highest education level: Not on file   Social Needs   • Financial resource strain: Not on file   • Food insecurity - worry: Not on file   • Food insecurity - inability: Not on file   • Transportation needs - medical: Not on file   • Transportation needs - non-medical: Not on file   Occupational History   • Not on file   Tobacco Use   • Smoking status: Former Smoker     Packs/day: 1.00     Years: 0.00      "Pack years: 0.00     Types: Cigarettes     Start date: 1961     Last attempt to quit: 1986     Years since quittin.5   • Smokeless tobacco: Never Used   Substance and Sexual Activity   • Alcohol use: No   • Drug use: No   • Sexual activity: Defer   Other Topics Concern   • Not on file   Social History Narrative   • Not on file       Family History   Problem Relation Age of Onset   • Cancer Father    • No Known Problems Mother        Review of Systems   Constitutional: Positive for fatigue (easily fatigued).   HENT: Negative.  Negative for congestion, rhinorrhea and sneezing.    Eyes: Negative.  Negative for visual disturbance.   Respiratory: Positive for shortness of breath (easily SOA ; worse on exertion). Negative for cough, chest tightness and wheezing.    Cardiovascular: Negative.  Negative for chest pain, palpitations and leg swelling.   Gastrointestinal: Negative.  Negative for abdominal pain, nausea and vomiting.   Endocrine: Negative.  Negative for cold intolerance and heat intolerance.   Genitourinary: Negative.  Negative for difficulty urinating, frequency and urgency.   Musculoskeletal: Positive for arthralgias (joints). Negative for back pain, myalgias, neck pain and neck stiffness.   Skin: Negative.  Negative for rash and wound.   Allergic/Immunologic: Negative.  Negative for environmental allergies and food allergies.   Neurological: Negative.  Negative for dizziness, syncope, weakness, light-headedness and headaches.   Hematological: Bruises/bleeds easily (bruises and bleeds easily).   Psychiatric/Behavioral: Positive for confusion (easily confused). Negative for agitation and sleep disturbance (denies waking up smothering/SOA). The patient is nervous/anxious (easily nervous/anxious).        Objective   Vitals:    19 1142   BP: 113/61   BP Location: Left arm   Patient Position: Sitting   Pulse: 68   SpO2: 97%   Weight: 79.3 kg (174 lb 12.8 oz)   Height: 154.9 cm (61\")      BP " "113/61 (BP Location: Left arm, Patient Position: Sitting)   Pulse 68   Ht 154.9 cm (61\")   Wt 79.3 kg (174 lb 12.8 oz)   SpO2 97%   BMI 33.03 kg/m²    Lab Results (most recent)     None        Physical Exam   Constitutional: She is oriented to person, place, and time. She appears well-developed and well-nourished. No distress.   HENT:   Head: Normocephalic and atraumatic.   Eyes: Conjunctivae and EOM are normal. Pupils are equal, round, and reactive to light.   Neck: Normal range of motion. Neck supple. No JVD present. No tracheal deviation present.   Cardiovascular: Normal rate, regular rhythm, normal heart sounds and intact distal pulses.   Pulmonary/Chest: Effort normal. She has decreased breath sounds. She has no rales (Minimal bibasilar rales).   Abdominal: Soft. Bowel sounds are normal. She exhibits no distension and no mass. There is no tenderness. There is no rebound and no guarding.   Musculoskeletal: Normal range of motion. She exhibits edema. She exhibits no tenderness or deformity.   Neurological: She is alert and oriented to person, place, and time.   Skin: Skin is warm and dry. No rash noted. No erythema. No pallor.   Psychiatric: She has a normal mood and affect. Her behavior is normal. Judgment and thought content normal.   Nursing note and vitals reviewed.        Procedure   Procedures       Assessment/Plan      Diagnosis Plan   1. Paroxysmal atrial fibrillation (CMS/HCC)     2. Shortness of breath     3. Pulmonary HTN (CMS/HCC)       The patient is stable at this time.  She seems to be doing well with regards to A. fib on flecainide therapy.  She is tolerating anticoagulation therapy without complication.  Symptoms of shortness of air are minimal.  Pulmonary pressures are stable by last echocardiogram.  We will continue to follow that with serial echocardiograms in the clinic.  Nothing further for now as the patient is doing well.  We will see her back in 6 months, sooner if indicated for " course.           Patient's Body mass index is 33.03 kg/m². BMI is above normal parameters. Recommendations include: educational material and referral to primary care.             Electronically signed by:

## 2019-03-07 NOTE — PATIENT INSTRUCTIONS

## 2019-05-23 ENCOUNTER — LAB (OUTPATIENT)
Dept: ONCOLOGY | Facility: CLINIC | Age: 79
End: 2019-05-23

## 2019-05-23 VITALS
OXYGEN SATURATION: 98 % | TEMPERATURE: 97 F | RESPIRATION RATE: 20 BRPM | DIASTOLIC BLOOD PRESSURE: 56 MMHG | HEART RATE: 61 BPM | SYSTOLIC BLOOD PRESSURE: 134 MMHG

## 2019-05-23 DIAGNOSIS — D64.9 NORMOCYTIC ANEMIA: ICD-10-CM

## 2019-05-23 DIAGNOSIS — R78.79 HIGH BLOOD COPPER LEVEL: ICD-10-CM

## 2019-05-23 DIAGNOSIS — D50.9 IRON DEFICIENCY ANEMIA, UNSPECIFIED IRON DEFICIENCY ANEMIA TYPE: ICD-10-CM

## 2019-05-23 DIAGNOSIS — D47.2 MGUS (MONOCLONAL GAMMOPATHY OF UNKNOWN SIGNIFICANCE): ICD-10-CM

## 2019-05-23 LAB
BASOPHILS # BLD AUTO: 0 10*3/MM3 (ref 0–0.2)
BASOPHILS NFR BLD AUTO: 0 % (ref 0–1.5)
CRP SERPL-MCNC: 2.58 MG/DL (ref 0–0.5)
DEPRECATED RDW RBC AUTO: 43 FL (ref 37–54)
EOSINOPHIL # BLD AUTO: 0 10*3/MM3 (ref 0–0.4)
EOSINOPHIL NFR BLD AUTO: 0 % (ref 0.3–6.2)
ERYTHROCYTE [DISTWIDTH] IN BLOOD BY AUTOMATED COUNT: 13.6 % (ref 12.3–15.4)
FERRITIN SERPL-MCNC: 266.4 NG/ML (ref 13–150)
HCT VFR BLD AUTO: 32.4 % (ref 34–46.6)
HGB BLD-MCNC: 9.8 G/DL (ref 12–15.9)
IMM GRANULOCYTES # BLD AUTO: 0.01 10*3/MM3 (ref 0–0.05)
IMM GRANULOCYTES NFR BLD AUTO: 0.2 % (ref 0–0.5)
IRON 24H UR-MRATE: 88 MCG/DL (ref 37–145)
IRON SATN MFR SERPL: 30 % (ref 20–50)
LYMPHOCYTES # BLD AUTO: 0.61 10*3/MM3 (ref 0.7–3.1)
LYMPHOCYTES NFR BLD AUTO: 10.4 % (ref 19.6–45.3)
MCH RBC QN AUTO: 26.8 PG (ref 26.6–33)
MCHC RBC AUTO-ENTMCNC: 30.2 G/DL (ref 31.5–35.7)
MCV RBC AUTO: 88.8 FL (ref 79–97)
MONOCYTES # BLD AUTO: 0.51 10*3/MM3 (ref 0.1–0.9)
MONOCYTES NFR BLD AUTO: 8.7 % (ref 5–12)
NEUTROPHILS # BLD AUTO: 4.75 10*3/MM3 (ref 1.7–7)
NEUTROPHILS NFR BLD AUTO: 80.7 % (ref 42.7–76)
PLATELET # BLD AUTO: 345 10*3/MM3 (ref 140–450)
PMV BLD AUTO: 9.8 FL (ref 6–12)
RBC # BLD AUTO: 3.65 10*6/MM3 (ref 3.77–5.28)
TIBC SERPL-MCNC: 297 MCG/DL (ref 298–536)
TRANSFERRIN SERPL-MCNC: 199 MG/DL (ref 200–360)
WBC NRBC COR # BLD: 5.88 10*3/MM3 (ref 3.4–10.8)

## 2019-05-23 PROCEDURE — 82728 ASSAY OF FERRITIN: CPT | Performed by: INTERNAL MEDICINE

## 2019-05-23 PROCEDURE — 84466 ASSAY OF TRANSFERRIN: CPT | Performed by: INTERNAL MEDICINE

## 2019-05-23 PROCEDURE — 85025 COMPLETE CBC W/AUTO DIFF WBC: CPT | Performed by: INTERNAL MEDICINE

## 2019-05-23 PROCEDURE — 86140 C-REACTIVE PROTEIN: CPT | Performed by: INTERNAL MEDICINE

## 2019-05-23 PROCEDURE — 83540 ASSAY OF IRON: CPT | Performed by: INTERNAL MEDICINE

## 2019-06-17 ENCOUNTER — TELEPHONE (OUTPATIENT)
Dept: CARDIOLOGY | Facility: CLINIC | Age: 79
End: 2019-06-17

## 2019-06-17 NOTE — TELEPHONE ENCOUNTER
6/13/19 fax received from [facility], Dr. Diane MD office requesting cardiac clearance for colonoscopy scheduled 7/23/19 on the desk of Jeovany Dotson PA-C for review.  ROSA,CMA

## 2019-06-24 NOTE — TELEPHONE ENCOUNTER
6/24/19 Cardiac Clearance letter faxed to [facility] per Jeovany Dotson PA-C

## 2019-07-23 ENCOUNTER — TELEPHONE (OUTPATIENT)
Dept: CARDIOLOGY | Facility: CLINIC | Age: 79
End: 2019-07-23

## 2019-07-23 NOTE — TELEPHONE ENCOUNTER
EMERGENCY DEPARTMENT ENCOUNTER      PCP: Srinivas Pearl MD    [address] [facility]    Chief Complaint   Patient presents with    Wrist Injury     fell while skatboarding         This patient was not evaluated by the attending physician. I have independently evaluated this patient . HPI    Gilmar Foster is a 20 y.o. female who presents with right wrist pain. Onset this morning. Context is patient fell off skateboard onto outstretched right hand. She notes pain which is generalized the right wrist since. Pain does not radiate. Pain is worse with range of motion. No other injuries. No distal numbness, tingling, weakness, or functional deficit. No other pain. REVIEW OF SYSTEMS    General: Denies constitutional symptoms. Cardiac: Denies chest wall injury or chest pain  Pulmonary: Denies chest wall injury or shortness of breath  GI: Denies abdomen injury or abdominal pain  Musculoskeletal:  Denies any other musculoskeletal pain or injuries, including chest wall and back. Skin:  + abrasion at base of RIGHT hand, otherwiseSkin intact. Lymphatics:  No nodules or streaks. See HPI and nursing notes for additional information     PAST MEDICAL & SURGICAL HISTORY    History reviewed. No pertinent past medical history. History reviewed. No pertinent surgical history.     CURRENT MEDICATIONS        ALLERGIES    Allergies   Allergen Reactions    Other      Seafood       Tree Nut [Macadamia Nut Oil]        SOCIAL & FAMILY HISTORY    Social History     Socioeconomic History    Marital status: Single     Spouse name: None    Number of children: None    Years of education: None    Highest education level: None   Occupational History    None   Social Needs    Financial resource strain: None    Food insecurity:     Worry: None     Inability: None    Transportation needs:     Medical: None     Non-medical: None   Tobacco Use    Smoking status: Current Every Day Smoker     Packs/day: 0.50    error   Smokeless tobacco: Never Used   Substance and Sexual Activity    Alcohol use: Yes     Comment: occ    Drug use: Yes     Types: Marijuana    Sexual activity: None   Lifestyle    Physical activity:     Days per week: None     Minutes per session: None    Stress: None   Relationships    Social connections:     Talks on phone: None     Gets together: None     Attends [employer] service: None     Active member of club or organization: None     Attends meetings of clubs or organizations: None     Relationship status: None    Intimate partner violence:     Fear of current or ex partner: None     Emotionally abused: None     Physically abused: None     Forced sexual activity: None   Other Topics Concern    None   Social History Narrative    None     History reviewed. No pertinent family history. PHYSICAL EXAM    VITAL SIGNS: /68   Pulse 77   Temp 98.1 °F (36.7 °C) (Oral)   Resp 14   Ht 5' 2\" (1.575 m)   Wt 116 lb (52.6 kg)   SpO2 100%   BMI 21.22 kg/m²   Constitutional:  Well developed, well nourished, no acute distress, non-toxic appearance   HENT:  Atraumatic    Musculoskeletal:   Right hand/right wrist exam: No gross deformity or discoloration. There is a small abrasion present over the base of the palmar aspect of the right hand. Wrist exam reveals no focal tenderness or palpable defect. Range of motion intact although slow in all directions due to generalized pain. Distal capillary refill, pulses, sensation and motor intact. Examination of remaining extremities reveals active ROM of  joints without ligamentous laxity. Theres no obvious joint or bony deformity. Lymphatics:  No swollen nodes or streaks. Integument:  + hand abrasion, o/w intactd  Vascular: affected extremity distally neurovascularly intact - pulses, sensation, and capillary refill intact. Neurologic:  Awake alert, normal flow of speech. Cranial nerves II through XII grossly intact.   Psychiatric: Cooperative, pleasant affect    RADIOLOGY/PROCEDURES    Xr Wrist Right (min 3 Views)    Result Date: 6/8/2019  EXAMINATION: 3 XRAY VIEWS OF THE RIGHT WRIST; 3 XRAY VIEWS OF THE RIGHT HAND 6/8/2019 10:24 pm COMPARISON: None. HISTORY: ORDERING SYSTEM PROVIDED HISTORY: pain TECHNOLOGIST PROVIDED HISTORY: Reason for exam:->pain Ordering Physician Provided Reason for Exam: pain Acuity: Acute Type of Exam: Initial Mechanism of Injury: fall while skateboarding Relevant Medical/Surgical History: none FINDINGS: Wrist: No acute fracture or dislocation. Alignment and joint spaces are maintained. Soft tissues are unremarkable. The bone mineralization is normal. No abnormal calcifications are present. Hand: Evaluation is partially limited by metallic rings over the index and long fingers. No acute fracture or dislocation. Alignment and joint spaces are maintained. Soft tissues are unremarkable. The bone mineralization is normal. No abnormal calcifications are present. No evidence of acute bony abnormality of the hand or wrist     Xr Hand Right (min 3 Views)    Result Date: 6/8/2019  EXAMINATION: 3 XRAY VIEWS OF THE RIGHT WRIST; 3 XRAY VIEWS OF THE RIGHT HAND 6/8/2019 10:24 pm COMPARISON: None. HISTORY: ORDERING SYSTEM PROVIDED HISTORY: pain TECHNOLOGIST PROVIDED HISTORY: Reason for exam:->pain Ordering Physician Provided Reason for Exam: pain Acuity: Acute Type of Exam: Initial Mechanism of Injury: fall while skateboarding Relevant Medical/Surgical History: none FINDINGS: Wrist: No acute fracture or dislocation. Alignment and joint spaces are maintained. Soft tissues are unremarkable. The bone mineralization is normal. No abnormal calcifications are present. Hand: Evaluation is partially limited by metallic rings over the index and long fingers. No acute fracture or dislocation. Alignment and joint spaces are maintained. Soft tissues are unremarkable. The bone mineralization is normal. No abnormal calcifications are present.      No evidence of acute bony abnormality of the hand or wrist         ED COURSE & MEDICAL DECISION MAKING        History and exam and imaging today consistent with wrist sprain and hand abrasion. We discussed the possibility of occult fracture, occult navicular fracture. Patient placed in a Velcro splint today-recommend splints times daily for gentle range of motion. Wound care discussed regarding abrasion. If no improvement over the next 5-7 days, patient to follow-up with orthopedist-referral information provided today. Patient agrees to return emergency department if symptoms worsen or any new symptoms develop. Clinical  IMPRESSION    1. Abrasion of right hand, initial encounter    2. Right wrist pain              Comment: Please note this report has been produced using speech recognition software and may contain errors related to that system including errors in grammar, punctuation, and spelling, as well as words and phrases that may be inappropriate. If there are any questions or concerns please feel free to contact the dictating provider for clarification.        Herbie Howell  06/08/19 [id number]

## 2019-07-26 ENCOUNTER — TELEPHONE (OUTPATIENT)
Dept: CARDIOLOGY | Facility: CLINIC | Age: 79
End: 2019-07-26

## 2019-07-26 NOTE — TELEPHONE ENCOUNTER
THE PATIENT'S SON CAME IN TO OBTAIN MEDICATION SAMPLES.  AN UPDATED HIPPA FORM WAS COMPLETED WITH THE PT BY THE PRACTICE MANAGER.

## 2019-08-09 ENCOUNTER — TELEPHONE (OUTPATIENT)
Dept: CARDIOLOGY | Facility: CLINIC | Age: 79
End: 2019-08-09

## 2019-08-09 NOTE — TELEPHONE ENCOUNTER
PCP OFFICE HAD CALLED CHECKING TO SEE IF OK WITH LUIZA PEREZ TO CHANGE PATIENT OVER TO COUMADIN. LUIZA PEREZ HAD RELAYED  TO LISBETH EARL OK TO SWITCH TO COUMADIN. CALLED AND SPOKE WITH KOBE AT [facility]'S OFFICE AND THEY HAD SHE WAS ON ELIQUIS, BUT SON HAD BEEN IN OFFICE RECENTLY REQUESTING XARELTO SAMPLES. EITHER MED PER LUIZA PEREZ HE WOULD RECOMMEND STARTING ON COUMADIN 5 MG DAILY, CONTINUE TAKING EITHER ELIQUIS OR XARELTO X 2 DAYS THEN STOP IT AND CHECK FIRST INR IN 4-5 DAYS. ABOVE RELAYED TO KOBE, HER NURSE AND VERBALIZED OK. SHE DID CONFIRM THEIR OFFICE WOULD BE MONITORING PT/INR AND DOSING. PH,LPN

## 2019-08-21 ENCOUNTER — LAB (OUTPATIENT)
Dept: ONCOLOGY | Facility: CLINIC | Age: 79
End: 2019-08-21

## 2019-08-21 ENCOUNTER — OFFICE VISIT (OUTPATIENT)
Dept: ONCOLOGY | Facility: CLINIC | Age: 79
End: 2019-08-21

## 2019-08-21 VITALS
WEIGHT: 176 LBS | BODY MASS INDEX: 33.25 KG/M2 | DIASTOLIC BLOOD PRESSURE: 58 MMHG | OXYGEN SATURATION: 96 % | TEMPERATURE: 97.2 F | SYSTOLIC BLOOD PRESSURE: 127 MMHG | RESPIRATION RATE: 18 BRPM | HEART RATE: 60 BPM

## 2019-08-21 DIAGNOSIS — D50.9 IRON DEFICIENCY ANEMIA, UNSPECIFIED IRON DEFICIENCY ANEMIA TYPE: ICD-10-CM

## 2019-08-21 DIAGNOSIS — D64.9 NORMOCYTIC ANEMIA: Primary | ICD-10-CM

## 2019-08-21 DIAGNOSIS — D47.2 MGUS (MONOCLONAL GAMMOPATHY OF UNKNOWN SIGNIFICANCE): ICD-10-CM

## 2019-08-21 DIAGNOSIS — R78.79 HIGH BLOOD COPPER LEVEL: ICD-10-CM

## 2019-08-21 DIAGNOSIS — D64.9 NORMOCYTIC ANEMIA: ICD-10-CM

## 2019-08-21 LAB
ALBUMIN SERPL-MCNC: 3.96 G/DL (ref 3.5–5.2)
ALBUMIN/GLOB SERPL: 1.4 G/DL
ALP SERPL-CCNC: 124 U/L (ref 39–117)
ALT SERPL W P-5'-P-CCNC: 10 U/L (ref 1–33)
ANION GAP SERPL CALCULATED.3IONS-SCNC: 11.5 MMOL/L (ref 5–15)
AST SERPL-CCNC: 13 U/L (ref 1–32)
BASOPHILS # BLD AUTO: 0.02 10*3/MM3 (ref 0–0.2)
BASOPHILS NFR BLD AUTO: 0.3 % (ref 0–1.5)
BILIRUB SERPL-MCNC: 0.3 MG/DL (ref 0.2–1.2)
BUN BLD-MCNC: 26 MG/DL (ref 8–23)
BUN/CREAT SERPL: 30.2 (ref 7–25)
CALCIUM SPEC-SCNC: 9.1 MG/DL (ref 8.6–10.5)
CHLORIDE SERPL-SCNC: 103 MMOL/L (ref 98–107)
CO2 SERPL-SCNC: 28.5 MMOL/L (ref 22–29)
CREAT BLD-MCNC: 0.86 MG/DL (ref 0.57–1)
CRP SERPL-MCNC: 6.03 MG/DL (ref 0–0.5)
DEPRECATED RDW RBC AUTO: 45.6 FL (ref 37–54)
EOSINOPHIL # BLD AUTO: 0.09 10*3/MM3 (ref 0–0.4)
EOSINOPHIL NFR BLD AUTO: 1.1 % (ref 0.3–6.2)
ERYTHROCYTE [DISTWIDTH] IN BLOOD BY AUTOMATED COUNT: 14.6 % (ref 12.3–15.4)
FERRITIN SERPL-MCNC: 167.2 NG/ML (ref 13–150)
GFR SERPL CREATININE-BSD FRML MDRD: 63 ML/MIN/1.73
GLOBULIN UR ELPH-MCNC: 2.8 GM/DL
GLUCOSE BLD-MCNC: 122 MG/DL (ref 65–99)
HCT VFR BLD AUTO: 29.7 % (ref 34–46.6)
HGB BLD-MCNC: 9.3 G/DL (ref 12–15.9)
IMM GRANULOCYTES # BLD AUTO: 0.01 10*3/MM3 (ref 0–0.05)
IMM GRANULOCYTES NFR BLD AUTO: 0.1 % (ref 0–0.5)
IRON 24H UR-MRATE: 30 MCG/DL (ref 37–145)
IRON SATN MFR SERPL: 11 % (ref 20–50)
LYMPHOCYTES # BLD AUTO: 1.03 10*3/MM3 (ref 0.7–3.1)
LYMPHOCYTES NFR BLD AUTO: 13 % (ref 19.6–45.3)
MCH RBC QN AUTO: 26.5 PG (ref 26.6–33)
MCHC RBC AUTO-ENTMCNC: 31.3 G/DL (ref 31.5–35.7)
MCV RBC AUTO: 84.6 FL (ref 79–97)
MONOCYTES # BLD AUTO: 0.53 10*3/MM3 (ref 0.1–0.9)
MONOCYTES NFR BLD AUTO: 6.7 % (ref 5–12)
NEUTROPHILS # BLD AUTO: 6.26 10*3/MM3 (ref 1.7–7)
NEUTROPHILS NFR BLD AUTO: 78.8 % (ref 42.7–76)
PLATELET # BLD AUTO: 265 10*3/MM3 (ref 140–450)
PMV BLD AUTO: 10.3 FL (ref 6–12)
POTASSIUM BLD-SCNC: 4.1 MMOL/L (ref 3.5–5.2)
PROT SERPL-MCNC: 6.8 G/DL (ref 6–8.5)
RBC # BLD AUTO: 3.51 10*6/MM3 (ref 3.77–5.28)
SODIUM BLD-SCNC: 143 MMOL/L (ref 136–145)
TIBC SERPL-MCNC: 282 MCG/DL (ref 298–536)
TRANSFERRIN SERPL-MCNC: 189 MG/DL (ref 200–360)
WBC NRBC COR # BLD: 7.94 10*3/MM3 (ref 3.4–10.8)

## 2019-08-21 PROCEDURE — 82728 ASSAY OF FERRITIN: CPT | Performed by: INTERNAL MEDICINE

## 2019-08-21 PROCEDURE — 99214 OFFICE O/P EST MOD 30 MIN: CPT | Performed by: INTERNAL MEDICINE

## 2019-08-21 PROCEDURE — 84165 PROTEIN E-PHORESIS SERUM: CPT | Performed by: INTERNAL MEDICINE

## 2019-08-21 PROCEDURE — 86334 IMMUNOFIX E-PHORESIS SERUM: CPT | Performed by: INTERNAL MEDICINE

## 2019-08-21 PROCEDURE — 86140 C-REACTIVE PROTEIN: CPT | Performed by: INTERNAL MEDICINE

## 2019-08-21 PROCEDURE — 82784 ASSAY IGA/IGD/IGG/IGM EACH: CPT | Performed by: INTERNAL MEDICINE

## 2019-08-21 PROCEDURE — 36415 COLL VENOUS BLD VENIPUNCTURE: CPT

## 2019-08-21 PROCEDURE — 84155 ASSAY OF PROTEIN SERUM: CPT | Performed by: INTERNAL MEDICINE

## 2019-08-21 PROCEDURE — 84466 ASSAY OF TRANSFERRIN: CPT | Performed by: INTERNAL MEDICINE

## 2019-08-21 PROCEDURE — 85025 COMPLETE CBC W/AUTO DIFF WBC: CPT | Performed by: INTERNAL MEDICINE

## 2019-08-21 PROCEDURE — 83883 ASSAY NEPHELOMETRY NOT SPEC: CPT | Performed by: INTERNAL MEDICINE

## 2019-08-21 PROCEDURE — 83540 ASSAY OF IRON: CPT | Performed by: INTERNAL MEDICINE

## 2019-08-21 PROCEDURE — 80053 COMPREHEN METABOLIC PANEL: CPT | Performed by: INTERNAL MEDICINE

## 2019-08-21 RX ORDER — SODIUM CHLORIDE 9 MG/ML
250 INJECTION, SOLUTION INTRAVENOUS ONCE
Status: CANCELLED | OUTPATIENT
Start: 2019-09-04

## 2019-08-21 RX ORDER — SODIUM CHLORIDE 9 MG/ML
250 INJECTION, SOLUTION INTRAVENOUS ONCE
Status: CANCELLED | OUTPATIENT
Start: 2019-09-09

## 2019-08-21 RX ORDER — SUCRALFATE 1 G/1
1 TABLET ORAL 2 TIMES DAILY
COMMUNITY

## 2019-08-21 RX ORDER — PRENATAL VIT NO.126/IRON/FOLIC 28MG-0.8MG
TABLET ORAL DAILY
COMMUNITY

## 2019-08-21 NOTE — PROGRESS NOTES
Cari Harper  [id number]  1940  8/21/2019      Referring Provider:   ALYSA Arevalo    Reason for Follow Up:   Normocytic anemia  Iron deficiency anemia  Monoclonal gammopathy of undetermined significance (MGUS)     Chief Complaint:  Fatigue      History of Present Illness   Cari Harper is a very pleasant 79 y.o.  female who presents in follow up appointment for further management and evaluation of normocytic anemia.    During her initial appointment Ms. Harper believed that her hemoglobin had been worsening for six months prior to her appointment with me. In review of her laboratory evaluation it appeared that she has had stable anemia with a hemoglobin in the 9 range since 2016. She had been more fatigued and dyspneic and recently underwent shoulder replacement surgery November 2018 and initially began following with me to help optimize her hemoglobin prior to surgery. She has never required a blood transfusion, and has been on oral ferrous sulfate 325mg once daily for one year and tolerating without any issues. She is also on Eliquis for history of atrial fibrillation and deep venous thrombosis several years ago. She denies of any abnormal or unusual bleeding. She believed her last colonoscopy was 2 or 3 years ago and was a routine colonoscopy and was normal as per family. She has continued to have stable anemia despite IV iron replacement. In workup for iron deficiency she was also found to have an elevated copper level despite stopping all multivitamins.      Treatment History:  IV injectafer: 10/23, 10/30/18      Interim History:  She has been intermittently taking oral iron as she has intermittently been receiving IV iron. She was recently seen by Dr. Contreras who performed an EGD and was significant for erosive gastritis which was felt to possibly be the cause for her iron deficiency. She also had a colonoscopy with polyps removed. Her son who is with her said there was no  mention in regards to copper and has an appointment with Dr. Contreras tomorrow. Patient denies of any bleeding. She notes that her Xarelto was changed to Coumadin due to cost and has been following closely with her primary provider.      The following portions of the patient's history were reviewed and updated as appropriate: allergies, current medications, past family history, past medical history, past social history, past surgical history and problem list.    No Known Allergies      Past Medical History:   Diagnosis Date   • Anxiety    • Arthritis    • Asthma    • Atrial fibrillation (CMS/HCC)    • Coronary artery disease    • Depression    • Disease of thyroid gland    • DVT (deep venous thrombosis) (CMS/HCC)    • Eczema    • GERD (gastroesophageal reflux disease)    • HLD (hyperlipidemia) 2018   • On home oxygen therapy     2L at night   • Wears dentures    • Wears glasses    Denies of hypertension    Past Surgical History:   Procedure Laterality Date   • APPENDECTOMY     • CHOLECYSTECTOMY     • COLONOSCOPY     • COLONOSCOPY W/ BIOPSIES     • EYE SURGERY      cataracts   • TOTAL SHOULDER ARTHROPLASTY W/ DISTAL CLAVICLE EXCISION Right 2018    Procedure: RIGHT REVERSE TOTAL SHOULDER REPLACEMENT;  Surgeon: Robbie Silveira MD;  Location: [facility];  Service: Orthopedics   • TUBAL ABDOMINAL LIGATION         Social History     Socioeconomic History   • Marital status: Unknown     Spouse name: Not on file   • Number of children: Not on file   • Years of education: Not on file   • Highest education level: Not on file   Tobacco Use   • Smoking status: Former Smoker     Packs/day: 1.00     Years: 0.00     Pack years: 0.00     Types: Cigarettes     Start date: 1961     Last attempt to quit: 1986     Years since quittin.0   • Smokeless tobacco: Never Used   Substance and Sexual Activity   • Alcohol use: No   • Drug use: No   • Sexual activity: Defer   She is , lives by herself but her  children live next to her. She was a previous smoker for 20-30 years and quit 30 years ago. No alcohol or illicit drug use. She is a .         Family History   Problem Relation Age of Onset   • Cancer Father    • No Known Problems Mother    Son -  at age of 49 from Sinus cancer    Son -  from [facility]  Sister - brain tumor       Current Outpatient Medications:   •  acetaminophen (TYLENOL) 500 MG tablet, Take 500 mg by mouth Every 6 (Six) Hours As Needed for Mild Pain ., Disp: , Rfl:   •  budesonide-formoterol (SYMBICORT) 160-4.5 MCG/ACT inhaler, Inhale 2 puffs 2 (Two) Times a Day., Disp: , Rfl:   •  bumetanide (BUMEX) 2 MG tablet, Take 1 tablet by mouth Daily., Disp: 30 tablet, Rfl: 11  •  clonazePAM (KlonoPIN) 1 MG tablet, Take 1 mg by mouth Every Night., Disp: , Rfl:   •  docusate sodium 100 MG capsule, Take 100 mg by mouth 2 (Two) Times a Day As Needed for Constipation. Over the counter, Disp: , Rfl:   •  flecainide (TAMBOCOR) 50 MG tablet, Take 1 tablet by mouth Every 12 (Twelve) Hours., Disp: 60 tablet, Rfl: 11  •  FLUoxetine (PROzac) 20 MG capsule, Take 20 mg by mouth 2 (two) times a day., Disp: , Rfl:   •  O2 (OXYGEN), Inhale 2 L/min Every Night., Disp: , Rfl:   •  Omega-3 Fatty Acids (FISH OIL) 1200 MG capsule capsule, Take 2,400 mg by mouth Every Evening., Disp: , Rfl:   •  omeprazole (priLOSEC) 20 MG capsule, Take 20 mg by mouth 2 (Two) Times a Day., Disp: , Rfl:   •  potassium chloride (K-DUR) 10 MEQ CR tablet, Take 1 tablet by mouth Daily., Disp: 30 tablet, Rfl: 11  •  Thyroid 60 MG PO tablet, Take 60 mg by mouth Every Morning., Disp: , Rfl:         Review of Systems  A comprehensive 14 point review of systems was performed.  Significant findings as mentioned above.  All other systems reviewed and are negative with exception of ongoing knee pain and fatigue.        Physical Exam  Vital Signs: These were reviewed and listed as per patient’s electronic medical chart  Vitals:     08/21/19 1102   BP: 127/58   Pulse: 60   Resp: 18   Temp: 97.2 °F (36.2 °C)   SpO2: 96%     General: Awake, alert and oriented, in no distress, elderly, in a wheelchair  HEENT: Head is atraumatic, normocephalic, extraocular movements full, oropharynx clear, no scleral icterus, pink moist mucous membranes  Neck: supple, no jvd, lymphadenopathy or masses  Cardiovascular: regular rate and rhythm without murmurs, rubs or gallops  Pulmonary: non-labored, clear to auscultation bilaterally, no wheezing  Abdomen: soft, non-tender, non-distended, normal active bowel sounds present  Extremities: No clubbing, cyanosis or edema  Lymph: No cervical, supraclavicular adenopathy  Neurologic: Mental status as above, alert, awake and oriented, grossly non-focal exam, in a wheelchair  Skin: warm, dry, intact, positive ecchymosis  Patient was examined on 08/21/19 and changes are reflected and noted above.      Labs / Studies:    Lab on 05/23/2019   Component Date Value   • Iron 05/23/2019 88    • Iron Saturation 05/23/2019 30    • Transferrin 05/23/2019 199*   • TIBC 05/23/2019 297*   • Ferritin 05/23/2019 266.40*   • C-Reactive Protein 05/23/2019 2.58*   • WBC 05/23/2019 5.88    • RBC 05/23/2019 3.65*   • Hemoglobin 05/23/2019 9.8*   • Hematocrit 05/23/2019 32.4*   • MCV 05/23/2019 88.8    • MCH 05/23/2019 26.8    • MCHC 05/23/2019 30.2*   • RDW 05/23/2019 13.6    • RDW-SD 05/23/2019 43.0    • MPV 05/23/2019 9.8    • Platelets 05/23/2019 345    • Neutrophil % 05/23/2019 80.7*   • Lymphocyte % 05/23/2019 10.4*   • Monocyte % 05/23/2019 8.7    • Eosinophil % 05/23/2019 0.0*   • Basophil % 05/23/2019 0.0    • Immature Grans % 05/23/2019 0.2    • Neutrophils, Absolute 05/23/2019 4.75    • Lymphocytes, Absolute 05/23/2019 0.61*   • Monocytes, Absolute 05/23/2019 0.51    • Eosinophils, Absolute 05/23/2019 0.00    • Basophils, Absolute 05/23/2019 0.00    • Immature Grans, Absolute 05/23/2019 0.01         PATHOLOGY:  11/29/18: Peripheral  Smear:        18:              01/10/19:                        IMAGIN19: XR BONE SURVEY COMPLETE: Lytic lesions: None. Alignment: The bones are in normal anatomic alignment. There is no evidence of fracture or dislocation. Mineralization: Normal Joints: Right humeral arthroplasty. Degenerative changes. Degenerative disc disease: Degenerative changes of the cervical, thoracic, and lumbar spines. Other findings: None IMPRESSION: Degenerative changes. No lytic bony lesions identified.                   Assessment/Plan   Cari Harper is a very pleasant 79 y.o.  female who presents in follow up appointment for further management and evaluation of normocytic and iron deficiency anemia.     Normocytic anemia  Iron deficiency anemia  - We previously had spent a great deal of time discussing the differential for anemia and treatment for various diagnosis.  - She continued to have an iron deficiency despite oral iron therefore I suspect she likely has malabsorption, possibly due to PPI, and therefore she was started on IV injectafer. Tissue transglutaminase level and zinc were obtained to further evaluate iron deficiency and were normal. However copper level was found to be elevated and and further discussed below.   - B12 and folate levels show repletion  - LDH and retic count are normal thus less likely to be hemolysis, haptoglobin in fact elevated  - Today her iron levels are declining as well as ferritin level and hemoglobin therefore will restart IV iron and start IV Feraheme. Iron deficiency is likely due to erosive gastritis that was seen on EGD and she is currently on sucralfate. If she continues to have declining hemoglobin she was advised to discuss the risk and benefits of anticoagulation with her cardiologist and primary provider.   - Given the ongoing anemia I did also obtain a peripheral smear which was significant for a normocytic anemia with frequent microcytes suggestive of  underlying iron deficiency.   - SPEP and serum free light chains were abnormal and further discussed below.    Monoclonal gammopathy of undetermined significance (MGUS)  - SPEP was significant for an abnormal immunofixation with an IgG monoclonal protein with lambda light specificity with an M-spike elevation at 0.3g/dL. While kappa light chain was minimally elevated the ratio was within normal limits.   - On repeat testing no M spike was seen on most recent SPEP. UPEP was essentially normal and presence of monoclonal protein that was previously seen is now unclear. While kappa light chain was mildly elevated the serum ratio was within normal limits. Skeletal survey was significant for degenerative changes otherwise no lytic bony lesions seen.  - I suspect that she likely has an MGUS based on her serum studies however given her ongoing anemia I again had a long discussion with her and her sons that she would require a bone marrow biopsy to further assess the normocytic anemia and MGUS. We previously discussed bone marrow biopsy and aspirate procedure as well as its risks. She discussed this with her family and does not want to pursue bone marrow biopsy and would prefer to monitor her studies.  - Therefore will continue to monitor MGUS serum studies every 6 months to one year.    Copper elevation  - She was advised to discontinue any vitamins containing Copper. She denies of any abnormal water contamination and drinks city, filtered water.  - This was repeated and shows ongoing elevation along with copper in the urine therefore she has been referred to gastroenterology for further evaluation and her son will further discuss this with Dr. Contreras during their appointment tomorrow. Will obtain records.      ACO Quality measures  ACO / PILLO/Other  Quality measures  - Cari Harper received 2018 flu vaccine.  - Cari Harper reports a pain score of 9.  Given her pain assessment as noted, treatment options were discussed  and the following options were decided upon as a follow-up plan to address the patient's pain: continuation of current treatment plan for pain in which she will follow up with her primary provider she has been receiving steroid injections to the knee which she has an appointment coming up.  - Current outpatient and discharge medications have been reconciled for the patient.  Reviewed by: Inés Hall MD      I will have the patient return for laboratory evaluation in 3 months and in follow up appointment in 6 months with labs one week prior. She understands that should she have any questions or concerns prior to her appointment she should give us a call at any time and I would be happy to see her sooner. It was a pleasure to see this patient in clinic today, thank you for allowing me to participate in the care of this patient.        Inés Hall MD  08/21/2019  11:08 AM

## 2019-08-22 LAB
ALBUMIN SERPL-MCNC: 3.1 G/DL (ref 2.9–4.4)
ALBUMIN/GLOB SERPL: 1.1 {RATIO} (ref 0.7–1.7)
ALPHA1 GLOB FLD ELPH-MCNC: 0.3 G/DL (ref 0–0.4)
ALPHA2 GLOB SERPL ELPH-MCNC: 1 G/DL (ref 0.4–1)
B-GLOBULIN SERPL ELPH-MCNC: 1.1 G/DL (ref 0.7–1.3)
GAMMA GLOB SERPL ELPH-MCNC: 0.6 G/DL (ref 0.4–1.8)
GLOBULIN SER CALC-MCNC: 3.1 G/DL (ref 2.2–3.9)
IGA SERPL-MCNC: 230 MG/DL (ref 64–422)
IGG SERPL-MCNC: 595 MG/DL (ref 700–1600)
IGM SERPL-MCNC: 80 MG/DL (ref 26–217)
INTERPRETATION SERPL IEP-IMP: ABNORMAL
KAPPA LC SERPL-MCNC: 20.4 MG/L (ref 3.3–19.4)
KAPPA LC/LAMBDA SER: 1.46 {RATIO} (ref 0.26–1.65)
LAMBDA LC FREE SERPL-MCNC: 14 MG/L (ref 5.7–26.3)
Lab: ABNORMAL
M-SPIKE: ABNORMAL G/DL
PROT SERPL-MCNC: 6.2 G/DL (ref 6–8.5)

## 2019-09-04 ENCOUNTER — INFUSION (OUTPATIENT)
Dept: ONCOLOGY | Facility: HOSPITAL | Age: 79
End: 2019-09-04

## 2019-09-04 VITALS
WEIGHT: 180.8 LBS | DIASTOLIC BLOOD PRESSURE: 70 MMHG | HEART RATE: 56 BPM | SYSTOLIC BLOOD PRESSURE: 127 MMHG | OXYGEN SATURATION: 92 % | RESPIRATION RATE: 20 BRPM | BODY MASS INDEX: 34.16 KG/M2 | TEMPERATURE: 97.9 F

## 2019-09-04 DIAGNOSIS — D50.9 IRON DEFICIENCY ANEMIA, UNSPECIFIED IRON DEFICIENCY ANEMIA TYPE: Primary | ICD-10-CM

## 2019-09-04 PROCEDURE — 96374 THER/PROPH/DIAG INJ IV PUSH: CPT

## 2019-09-04 PROCEDURE — 25010000002 FERUMOXYTOL 510 MG/17ML SOLUTION 510 MG VIAL: Performed by: INTERNAL MEDICINE

## 2019-09-04 RX ORDER — SODIUM CHLORIDE 9 MG/ML
250 INJECTION, SOLUTION INTRAVENOUS ONCE
Status: COMPLETED | OUTPATIENT
Start: 2019-09-04 | End: 2019-09-04

## 2019-09-04 RX ADMIN — SODIUM CHLORIDE 250 ML: 9 INJECTION, SOLUTION INTRAVENOUS at 13:49

## 2019-09-04 RX ADMIN — FERUMOXYTOL 510 MG: 510 INJECTION INTRAVENOUS at 13:49

## 2019-09-09 ENCOUNTER — INFUSION (OUTPATIENT)
Dept: ONCOLOGY | Facility: HOSPITAL | Age: 79
End: 2019-09-09

## 2019-09-09 VITALS
OXYGEN SATURATION: 90 % | BODY MASS INDEX: 33.63 KG/M2 | RESPIRATION RATE: 18 BRPM | HEART RATE: 59 BPM | WEIGHT: 178 LBS | TEMPERATURE: 97.4 F | SYSTOLIC BLOOD PRESSURE: 149 MMHG | DIASTOLIC BLOOD PRESSURE: 62 MMHG

## 2019-09-09 DIAGNOSIS — D50.9 IRON DEFICIENCY ANEMIA, UNSPECIFIED IRON DEFICIENCY ANEMIA TYPE: Primary | ICD-10-CM

## 2019-09-09 PROCEDURE — 96374 THER/PROPH/DIAG INJ IV PUSH: CPT

## 2019-09-09 PROCEDURE — 25010000002 FERUMOXYTOL 510 MG/17ML SOLUTION 510 MG VIAL: Performed by: INTERNAL MEDICINE

## 2019-09-09 RX ORDER — SODIUM CHLORIDE 9 MG/ML
250 INJECTION, SOLUTION INTRAVENOUS ONCE
Status: COMPLETED | OUTPATIENT
Start: 2019-09-09 | End: 2019-09-09

## 2019-09-09 RX ADMIN — FERUMOXYTOL 510 MG: 510 INJECTION INTRAVENOUS at 11:48

## 2019-09-09 RX ADMIN — SODIUM CHLORIDE 250 ML: 9 INJECTION, SOLUTION INTRAVENOUS at 11:48

## 2019-09-12 ENCOUNTER — OFFICE VISIT (OUTPATIENT)
Dept: CARDIOLOGY | Facility: CLINIC | Age: 79
End: 2019-09-12

## 2019-09-12 VITALS
SYSTOLIC BLOOD PRESSURE: 130 MMHG | HEART RATE: 68 BPM | OXYGEN SATURATION: 94 % | BODY MASS INDEX: 34.74 KG/M2 | DIASTOLIC BLOOD PRESSURE: 80 MMHG | WEIGHT: 184 LBS | HEIGHT: 61 IN

## 2019-09-12 DIAGNOSIS — I48.0 PAROXYSMAL ATRIAL FIBRILLATION (HCC): Primary | ICD-10-CM

## 2019-09-12 DIAGNOSIS — R06.02 SHORTNESS OF BREATH: ICD-10-CM

## 2019-09-12 DIAGNOSIS — I27.20 PULMONARY HTN (HCC): ICD-10-CM

## 2019-09-12 PROCEDURE — 99213 OFFICE O/P EST LOW 20 MIN: CPT | Performed by: PHYSICIAN ASSISTANT

## 2019-09-12 PROCEDURE — 93000 ELECTROCARDIOGRAM COMPLETE: CPT | Performed by: PHYSICIAN ASSISTANT

## 2019-09-12 RX ORDER — PREDNISONE 20 MG/1
20 TABLET ORAL DAILY
COMMUNITY
End: 2020-02-19

## 2019-09-12 RX ORDER — WARFARIN SODIUM 5 MG/1
5 TABLET ORAL
COMMUNITY

## 2019-09-12 RX ORDER — HYDROCODONE BITARTRATE AND ACETAMINOPHEN 5; 325 MG/1; MG/1
1 TABLET ORAL EVERY 6 HOURS PRN
COMMUNITY
End: 2020-02-19

## 2019-09-12 RX ORDER — LORATADINE 10 MG/1
10 TABLET ORAL DAILY
COMMUNITY

## 2019-09-12 NOTE — PATIENT INSTRUCTIONS
"Fat and Cholesterol Restricted Eating Plan  Getting too much fat and cholesterol in your diet may cause health problems. Choosing the right foods helps keep your fat and cholesterol at normal levels. This can keep you from getting certain diseases.  Your doctor may recommend an eating plan that includes:  · Total fat: ______% or less of total calories a day.  · Saturated fat: ______% or less of total calories a day.  · Cholesterol: less than _________mg a day.  · Fiber: ______g a day.  What are tips for following this plan?  General tips    · Work with your doctor to lose weight if you need to.  · Avoid:  ? Foods with added sugar.  ? Fried foods.  ? Foods with partially hydrogenated oils.  · Limit alcohol intake to no more than 1 drink a day for nonpregnant women and 2 drinks a day for men. One drink equals 12 oz of beer, 5 oz of wine, or 1½ oz of hard liquor.  Reading food labels  · Check food labels for:  ? Trans fats.  ? Partially hydrogenated oils.  ? Saturated fat (g) in each serving.  ? Cholesterol (mg) in each serving.  ? Fiber (g) in each serving.  · Choose foods with healthy fats, such as:  ? Monounsaturated fats.  ? Polyunsaturated fats.  ? Omega-3 fats.  · Choose grain products that have whole grains. Look for the word \"whole\" as the first word in the ingredient list.  Cooking  · Cook foods using low-fat methods. These include baking, boiling, grilling, and broiling.  · Eat more home-cooked foods. Eat at restaurants and buffets less often.  · Avoid cooking using saturated fats, such as butter, cream, palm oil, palm kernel oil, and coconut oil.  Meal planning    · At meals, divide your plate into four equal parts:  ? Fill one-half of your plate with vegetables and green salads.  ? Fill one-fourth of your plate with whole grains.  ? Fill one-fourth of your plate with low-fat (lean) protein foods.  · Eat fish that is high in omega-3 fats at least two times a week. This includes mackerel, tuna, sardines, and " salmon.  · Eat foods that are high in fiber, such as whole grains, beans, apples, broccoli, carrots, peas, and barley.  Recommended foods  Grains  · Whole grains, such as whole wheat or whole grain breads, crackers, cereals, and pasta. Unsweetened oatmeal, bulgur, barley, quinoa, or brown rice. Corn or whole wheat flour tortillas.  Vegetables  · Fresh or frozen vegetables (raw, steamed, roasted, or grilled). Green salads.  Fruits  · All fresh, canned (in natural juice), or frozen fruits.  Meats and other protein foods  · Ground beef (85% or leaner), grass-fed beef, or beef trimmed of fat. Skinless chicken or turkey. Ground chicken or turkey. Pork trimmed of fat. All fish and seafood. Egg whites. Dried beans, peas, or lentils. Unsalted nuts or seeds. Unsalted canned beans. Nut butters without added sugar or oil.  Dairy  · Low-fat or nonfat dairy products, such as skim or 1% milk, 2% or reduced-fat cheeses, low-fat and fat-free ricotta or cottage cheese, or plain low-fat and nonfat yogurt.  Fats and oils  · Tub margarine without trans fats. Light or reduced-fat mayonnaise and salad dressings. Avocado. Olive, canola, sesame, or safflower oils.  The items listed above may not be a complete list of recommended foods or beverages. Contact your dietitian for more options.  The items listed above may not be a complete list of foods and beverages [you/your child] can eat. Contact a dietitian for more information.  Foods to avoid  Grains  · White bread. White pasta. White rice. Cornbread. Bagels, pastries, and croissants. Crackers and snack foods that contain trans fat and hydrogenated oils.  Vegetables  · Vegetables cooked in cheese, cream, or butter sauce. Fried vegetables.  Fruits  · Canned fruit in heavy syrup. Fruit in cream or butter sauce. Fried fruit.  Meats and other protein foods  · Fatty cuts of meat. Ribs, chicken wings, simpson, sausage, bologna, salami, chitterlings, fatback, hot dogs, bratwurst, and packaged  lunch meats. Liver and organ meats. Whole eggs and egg yolks. Chicken and turkey with skin. Fried meat.  Dairy  · Whole or 2% milk, cream, half-and-half, and cream cheese. Whole milk cheeses. Whole-fat or sweetened yogurt. Full-fat cheeses. Nondairy creamers and whipped toppings. Processed cheese, cheese spreads, and cheese curds.  Beverages  · Alcohol. Sugar-sweetened drinks such as sodas, lemonade, and fruit drinks.  Fats and oils  · Butter, stick margarine, lard, shortening, ghee, or simpson fat. Coconut, palm kernel, and palm oils.  Sweets and desserts  · Corn syrup, sugars, honey, and molasses. Candy. Jam and jelly. Syrup. Sweetened cereals. Cookies, pies, cakes, donuts, muffins, and ice cream.  The items listed above may not be a complete list of foods and beverages to avoid. Contact your dietitian for more information.  The items listed above may not be a complete list of foods and beverages [you/your child] should avoid. Contact a dietitian for more information.  Summary  · Choosing the right foods helps keep your fat and cholesterol at normal levels. This can keep you from getting certain diseases.  · At meals, fill one-half of your plate with vegetables and green salads.  · Eat high-fiber foods, like whole grains, beans, apples, carrots, peas, and barley.  · Limit added sugar, saturated fats, alcohol, and fried foods.  This information is not intended to replace advice given to you by your health care provider. Make sure you discuss any questions you have with your health care provider.  Document Released: 06/18/2013 Document Revised: 09/04/2018 Document Reviewed: 09/04/2018  [employer] Interactive Patient Education © 2019 [employer]  BMI for Adults    Body mass index (BMI) is a number that is calculated from a person's weight and height. BMI may help to estimate how much of a person's weight is composed of fat. BMI can help identify those who may be at higher risk for certain medical problems.  How is BMI  "used with adults?  BMI is used as a screening tool to identify possible weight problems. It is used to check whether a person is obese, overweight, healthy weight, or underweight.  How is BMI calculated?  BMI measures your weight and compares it to your height. This can be done either in English ([place] or metric measurements. Note that charts are available to help you find your BMI quickly and easily without having to do these calculations yourself.  To calculate your BMI in English ([place] measurements, your health care provider will:  1. Measure your weight in pounds (lb).  2. Multiply the number of pounds by 703.  ? For example, for a person who weighs 180 lb, multiply that number by 703, which equals 126,540.  3. Measure your height in inches (in). Then multiply that number by itself to get a measurement called \"inches squared.\"  ? For example, for a person who is 70 in tall, the \"inches squared\" measurement is 70 in x 70 in, which equals 4900 inches squared.  4. Divide the total from Step 2 (number of lb x 703) by the total from Step 3 (inches squared): 126,540 ÷ 4900 = 25.8. This is your BMI.  To calculate your BMI in metric measurements, your health care provider will:  1. Measure your weight in kilograms (kg).  2. Measure your height in meters (m). Then multiply that number by itself to get a measurement called \"meters squared.\"  ? For example, for a person who is 1.75 m tall, the \"meters squared\" measurement is 1.75 m x 1.75 m, which is equal to 3.1 meters squared.  3. Divide the number of kilograms (your weight) by the meters squared number. In this example: 70 ÷ 3.1 = 22.6. This is your BMI.  How is BMI interpreted?  To interpret your results, your health care provider will use BMI charts to identify whether you are underweight, normal weight, overweight, or obese. The following guidelines will be used:  · Underweight: BMI less than 18.5.  · Normal weight: BMI between 18.5 and 24.9.  · Overweight: BMI " between 25 and 29.9.  · Obese: BMI of 30 and above.  Please note:  · Weight includes both fat and muscle, so someone with a muscular build, such as an athlete, may have a BMI that is higher than 24.9. In cases like these, BMI is not an accurate measure of body fat.  · To determine if excess body fat is the cause of a BMI of 25 or higher, further assessments may need to be done by a health care provider.  · BMI is usually interpreted in the same way for men and women.  Why is BMI a useful tool?  BMI is useful in two ways:  · Identifying a weight problem that may be related to a medical condition, or that may increase the risk for medical problems.  · Promoting lifestyle and diet changes in order to reach a healthy weight.  Summary  · Body mass index (BMI) is a number that is calculated from a person's weight and height.  · BMI may help to estimate how much of a person's weight is composed of fat. BMI can help identify those who may be at higher risk for certain medical problems.  · BMI can be measured using English measurements or metric measurements.  · To interpret your results, your health care provider will use BMI charts to identify whether you are underweight, normal weight, overweight, or obese.  This information is not intended to replace advice given to you by your health care provider. Make sure you discuss any questions you have with your health care provider.  Document Released: 08/29/2005 Document Revised: 10/31/2018 Document Reviewed: 10/31/2018  [employer] Interactive Patient Education © 2019 [employer]

## 2019-09-12 NOTE — PROGRESS NOTES
Problem list     Subjective   Cari Harper is a 79 y.o. female     Chief Complaint   Patient presents with   • Atrial Fibrillation     Here for a follow up    • Congestive Heart Failure   • Shortness of Breath   Problem List:  1. Paroxysmal atrial fibrillation  2. Preserved systolic function  3. Low risk stress test with no evidence of ischemia.  4. Hypertension  5. Mild pulmonary hypertension  6. Dyslipidemia  7. Hypothyroidism  8. First degree AVB.    HPI  The patient presents in today for evaluation of follow-up.  Currently, the patient tells me she is doing fairly well.  We discontinued prior anticoagulation and change her to Coumadin because of cost constraints.  She has done well on that.  Her most recent INR was at 3.6 and that is being adjusted through her primary care provider.  Likewise, the patient has stable dyspnea.  She has infrequent palpitations.  She has nothing to suggest recurrent episodes of A. fib.  The patient has no chest pain.  She has no failure symptoms.  She denies dizziness or syncope.  Blood pressures well controlled.  She has no further complaints.    Current Outpatient Medications   Medication Sig Dispense Refill   • acetaminophen (TYLENOL) 500 MG tablet Take 500 mg by mouth Every 6 (Six) Hours As Needed for Mild Pain .     • bumetanide (BUMEX) 2 MG tablet Take 1 tablet by mouth Daily. 30 tablet 11   • clonazePAM (KlonoPIN) 1 MG tablet Take 1 mg by mouth Every Night.     • flecainide (TAMBOCOR) 50 MG tablet Take 1 tablet by mouth Every 12 (Twelve) Hours. 60 tablet 11   • FLUoxetine (PROzac) 20 MG capsule Take 20 mg by mouth 2 (two) times a day.     • Fluticasone-Umeclidin-Vilant (TRELEGY ELLIPTA) 100-62.5-25 MCG/INH aerosol powder  Inhale 1 each Daily.     • HYDROcodone-acetaminophen (NORCO) 5-325 MG per tablet Take 1 tablet by mouth Every 6 (Six) Hours As Needed.     • loratadine (CLARITIN) 10 MG tablet Take 10 mg by mouth Daily.     • O2 (OXYGEN) Inhale 2 L/min Every Night.     •  Omega-3 Fatty Acids (FISH OIL) 1200 MG capsule capsule Take 2,400 mg by mouth Every Evening.     • omeprazole (priLOSEC) 20 MG capsule Take 20 mg by mouth 2 (Two) Times a Day.     • Polyethylene Glycol 3350 (CLEARLAX PO) Take  by mouth.     • potassium chloride (K-DUR) 10 MEQ CR tablet Take 1 tablet by mouth Daily. 30 tablet 11   • predniSONE (DELTASONE) 20 MG tablet Take 20 mg by mouth Daily. For 5 days     • Prenatal Vit-Fe Fumarate-FA (PRENATAL, CLASSIC, VITAMIN) 28-0.8 MG tablet tablet Take  by mouth Daily.     • sucralfate (CARAFATE) 1 g tablet Take 1 g by mouth 2 (Two) Times a Day.     • Thyroid 60 MG PO tablet Take 60 mg by mouth Every Morning.     • warfarin (COUMADIN) 5 MG tablet Take 5 mg by mouth Daily. Takes 1/2 tablet daily     • budesonide-formoterol (SYMBICORT) 160-4.5 MCG/ACT inhaler Inhale 2 puffs 2 (Two) Times a Day.       No current facility-administered medications for this visit.        Patient has no known allergies.    Past Medical History:   Diagnosis Date   • Anxiety    • Arthritis    • Asthma    • Atrial fibrillation (CMS/HCC)    • Coronary artery disease    • Depression    • Disease of thyroid gland    • Diverticulitis    • DVT (deep venous thrombosis) (CMS/HCC)    • Eczema    • GERD (gastroesophageal reflux disease)    • HLD (hyperlipidemia) 2018   • Iron deficiency anemia    • On home oxygen therapy     2L at night   • Wears dentures    • Wears glasses        Social History     Socioeconomic History   • Marital status: Unknown     Spouse name: Not on file   • Number of children: Not on file   • Years of education: Not on file   • Highest education level: Not on file   Tobacco Use   • Smoking status: Former Smoker     Packs/day: 1.00     Years: 0.00     Pack years: 0.00     Types: Cigarettes     Start date: 1961     Last attempt to quit: 1986     Years since quittin.1   • Smokeless tobacco: Never Used   Substance and Sexual Activity   • Alcohol use: No   • Drug use:  "No   • Sexual activity: Defer       Family History   Problem Relation Age of Onset   • Cancer Father    • No Known Problems Mother        Review of Systems   Constitutional: Negative for chills and fever. Fatigue: stays tired. Has had 2 iron infusions    HENT: Positive for rhinorrhea. Negative for congestion and sore throat.    Eyes: Positive for visual disturbance (reading glasses ).   Respiratory: Positive for shortness of breath (SOA with exertion ). Negative for chest tightness.         Uses oxygen nightly    Cardiovascular: Positive for leg swelling (LE edema which mostly resolves overnight ). Negative for chest pain and palpitations.   Gastrointestinal: Negative.  Negative for abdominal pain, blood in stool, nausea and vomiting.   Endocrine: Positive for cold intolerance (always cold ). Negative for heat intolerance.   Genitourinary: Positive for frequency. Negative for dysuria, hematuria and urgency.   Musculoskeletal: Positive for back pain (Lower back pain ) and gait problem (uses a walker at home ). Negative for arthralgias.   Skin: Negative.  Negative for rash and wound.   Allergic/Immunologic: Negative.  Negative for environmental allergies and food allergies.   Neurological: Positive for tremors. Negative for dizziness, weakness and light-headedness.   Hematological: Bruises/bleeds easily (Bruises and bleeds easily ).   Psychiatric/Behavioral: Negative.  Negative for agitation, confusion and sleep disturbance (denies waking with smothering ). The patient is not nervous/anxious.        Objective   Vitals:    09/12/19 1344   BP: 130/80   BP Location: Left arm   Patient Position: Sitting   Pulse: 68   SpO2: 94%   Weight: 83.5 kg (184 lb)   Height: 154.9 cm (61\")      /80 (BP Location: Left arm, Patient Position: Sitting)   Pulse 68   Ht 154.9 cm (61\")   Wt 83.5 kg (184 lb)   SpO2 94%   BMI 34.77 kg/m²    Lab Results (most recent)     None        Physical Exam   Constitutional: She is oriented to " person, place, and time. She appears well-developed and well-nourished. No distress.   HENT:   Head: Normocephalic and atraumatic.   Eyes: Conjunctivae and EOM are normal. Pupils are equal, round, and reactive to light.   Neck: Normal range of motion. Neck supple. No JVD present. No tracheal deviation present.   Cardiovascular: Normal rate, regular rhythm, normal heart sounds and intact distal pulses.   Pulmonary/Chest: Effort normal. She has decreased breath sounds. She has no rales (Minimal bibasilar rales).   Abdominal: Soft. Bowel sounds are normal. She exhibits no distension and no mass. There is no tenderness. There is no rebound and no guarding.   Musculoskeletal: Normal range of motion. She exhibits edema. She exhibits no tenderness or deformity.   Neurological: She is alert and oriented to person, place, and time.   Skin: Skin is warm and dry. No rash noted. No erythema. No pallor.   Psychiatric: She has a normal mood and affect. Her behavior is normal. Judgment and thought content normal.   Nursing note and vitals reviewed.        Procedure     ECG 12 Lead  Date/Time: 9/12/2019 2:02 PM  Performed by: Jeovany Dotson PA  Authorized by: Jeovany Dotson PA   Comparison: compared with previous ECG from 9/10/2018  Comparison to previous ECG: Artifact throughout, baseline appears to be sinus rhythm with first-degree AV block, normal axis, minor nonspecific ST-T wave changes, no acute changes noted.  Comments: A fib                Assessment/Plan      Diagnosis Plan   1. Paroxysmal atrial fibrillation (CMS/HCC)  ECG 12 Lead   2. Shortness of breath     3. Pulmonary HTN (CMS/HCC)       1.  The patient is doing well with regards to treatment of A. fib with rhythm control medication and anticoagulation.  I will continue that without changes she is doing well.    2.  The patient's dyspnea is very much stable at this time.  Eventually, I do want to repeat an echocardiogram to evaluate systolic diastolic  parameters.  Will reevaluate pulmonary pressures as well given previous pulmonary hypertension per echo study.    3.  She would like to wait to have any further testing until her next appointment.  We will discuss this more detail with her then.  In the interim, she is stable and doing well on current medical regimen, I will continue that without change.  We will see her in 6 months, sooner if indicated by course.  She will call for any complications prior to follow-up.           Patient's Body mass index is 34.77 kg/m². BMI is above normal parameters. Recommendations include: educational material and referral to primary care.             Electronically signed by:

## 2019-11-20 ENCOUNTER — LAB (OUTPATIENT)
Dept: ONCOLOGY | Facility: CLINIC | Age: 79
End: 2019-11-20

## 2019-11-20 VITALS
SYSTOLIC BLOOD PRESSURE: 152 MMHG | TEMPERATURE: 97.3 F | HEART RATE: 64 BPM | DIASTOLIC BLOOD PRESSURE: 64 MMHG | RESPIRATION RATE: 18 BRPM | OXYGEN SATURATION: 93 %

## 2019-11-20 DIAGNOSIS — D50.9 IRON DEFICIENCY ANEMIA, UNSPECIFIED IRON DEFICIENCY ANEMIA TYPE: ICD-10-CM

## 2019-11-20 DIAGNOSIS — D47.2 MGUS (MONOCLONAL GAMMOPATHY OF UNKNOWN SIGNIFICANCE): ICD-10-CM

## 2019-11-20 DIAGNOSIS — D64.9 NORMOCYTIC ANEMIA: ICD-10-CM

## 2019-11-20 LAB
BASOPHILS # BLD AUTO: 0.03 10*3/MM3 (ref 0–0.2)
BASOPHILS NFR BLD AUTO: 0.4 % (ref 0–1.5)
CRP SERPL-MCNC: 9.66 MG/DL (ref 0–0.5)
DEPRECATED RDW RBC AUTO: 47.9 FL (ref 37–54)
EOSINOPHIL # BLD AUTO: 0.09 10*3/MM3 (ref 0–0.4)
EOSINOPHIL NFR BLD AUTO: 1.2 % (ref 0.3–6.2)
ERYTHROCYTE [DISTWIDTH] IN BLOOD BY AUTOMATED COUNT: 14.9 % (ref 12.3–15.4)
FERRITIN SERPL-MCNC: 508.7 NG/ML (ref 13–150)
HCT VFR BLD AUTO: 28.4 % (ref 34–46.6)
HGB BLD-MCNC: 8.9 G/DL (ref 12–15.9)
IMM GRANULOCYTES # BLD AUTO: 0.03 10*3/MM3 (ref 0–0.05)
IMM GRANULOCYTES NFR BLD AUTO: 0.4 % (ref 0–0.5)
IRON 24H UR-MRATE: 25 MCG/DL (ref 37–145)
IRON SATN MFR SERPL: 10 % (ref 20–50)
LYMPHOCYTES # BLD AUTO: 1.16 10*3/MM3 (ref 0.7–3.1)
LYMPHOCYTES NFR BLD AUTO: 15 % (ref 19.6–45.3)
MCH RBC QN AUTO: 27.2 PG (ref 26.6–33)
MCHC RBC AUTO-ENTMCNC: 31.3 G/DL (ref 31.5–35.7)
MCV RBC AUTO: 86.9 FL (ref 79–97)
MONOCYTES # BLD AUTO: 0.66 10*3/MM3 (ref 0.1–0.9)
MONOCYTES NFR BLD AUTO: 8.5 % (ref 5–12)
NEUTROPHILS # BLD AUTO: 5.78 10*3/MM3 (ref 1.7–7)
NEUTROPHILS NFR BLD AUTO: 74.5 % (ref 42.7–76)
NRBC BLD AUTO-RTO: 0 /100 WBC (ref 0–0.2)
PLATELET # BLD AUTO: 283 10*3/MM3 (ref 140–450)
PMV BLD AUTO: 9.9 FL (ref 6–12)
RBC # BLD AUTO: 3.27 10*6/MM3 (ref 3.77–5.28)
TIBC SERPL-MCNC: 259 MCG/DL (ref 298–536)
TRANSFERRIN SERPL-MCNC: 174 MG/DL (ref 200–360)
WBC NRBC COR # BLD: 7.75 10*3/MM3 (ref 3.4–10.8)

## 2019-11-20 PROCEDURE — 83540 ASSAY OF IRON: CPT | Performed by: INTERNAL MEDICINE

## 2019-11-20 PROCEDURE — 82728 ASSAY OF FERRITIN: CPT | Performed by: INTERNAL MEDICINE

## 2019-11-20 PROCEDURE — 85025 COMPLETE CBC W/AUTO DIFF WBC: CPT | Performed by: INTERNAL MEDICINE

## 2019-11-20 PROCEDURE — 84466 ASSAY OF TRANSFERRIN: CPT | Performed by: INTERNAL MEDICINE

## 2019-11-20 PROCEDURE — 86140 C-REACTIVE PROTEIN: CPT | Performed by: INTERNAL MEDICINE

## 2020-02-19 ENCOUNTER — OFFICE VISIT (OUTPATIENT)
Dept: ONCOLOGY | Facility: CLINIC | Age: 80
End: 2020-02-19

## 2020-02-19 ENCOUNTER — LAB (OUTPATIENT)
Dept: ONCOLOGY | Facility: CLINIC | Age: 80
End: 2020-02-19

## 2020-02-19 VITALS
OXYGEN SATURATION: 94 % | DIASTOLIC BLOOD PRESSURE: 58 MMHG | TEMPERATURE: 97.3 F | SYSTOLIC BLOOD PRESSURE: 131 MMHG | RESPIRATION RATE: 20 BRPM | BODY MASS INDEX: 32.88 KG/M2 | WEIGHT: 174 LBS | HEART RATE: 51 BPM

## 2020-02-19 DIAGNOSIS — D64.9 NORMOCYTIC ANEMIA: ICD-10-CM

## 2020-02-19 DIAGNOSIS — D47.2 MGUS (MONOCLONAL GAMMOPATHY OF UNKNOWN SIGNIFICANCE): ICD-10-CM

## 2020-02-19 DIAGNOSIS — D64.9 NORMOCYTIC ANEMIA: Primary | ICD-10-CM

## 2020-02-19 DIAGNOSIS — D50.9 IRON DEFICIENCY ANEMIA, UNSPECIFIED IRON DEFICIENCY ANEMIA TYPE: ICD-10-CM

## 2020-02-19 LAB
BASOPHILS # BLD AUTO: 0.02 10*3/MM3 (ref 0–0.2)
BASOPHILS NFR BLD AUTO: 0.3 % (ref 0–1.5)
CRP SERPL-MCNC: 0.55 MG/DL (ref 0–0.5)
DEPRECATED RDW RBC AUTO: 47.8 FL (ref 37–54)
EOSINOPHIL # BLD AUTO: 0.07 10*3/MM3 (ref 0–0.4)
EOSINOPHIL NFR BLD AUTO: 1 % (ref 0.3–6.2)
ERYTHROCYTE [DISTWIDTH] IN BLOOD BY AUTOMATED COUNT: 14.7 % (ref 12.3–15.4)
FERRITIN SERPL-MCNC: 352.9 NG/ML (ref 13–150)
HCT VFR BLD AUTO: 31.7 % (ref 34–46.6)
HGB BLD-MCNC: 9.6 G/DL (ref 12–15.9)
IMM GRANULOCYTES # BLD AUTO: 0.02 10*3/MM3 (ref 0–0.05)
IMM GRANULOCYTES NFR BLD AUTO: 0.3 % (ref 0–0.5)
IRON 24H UR-MRATE: 63 MCG/DL (ref 37–145)
IRON SATN MFR SERPL: 21 % (ref 20–50)
LYMPHOCYTES # BLD AUTO: 1.06 10*3/MM3 (ref 0.7–3.1)
LYMPHOCYTES NFR BLD AUTO: 14.8 % (ref 19.6–45.3)
MCH RBC QN AUTO: 26.6 PG (ref 26.6–33)
MCHC RBC AUTO-ENTMCNC: 30.3 G/DL (ref 31.5–35.7)
MCV RBC AUTO: 87.8 FL (ref 79–97)
MONOCYTES # BLD AUTO: 0.53 10*3/MM3 (ref 0.1–0.9)
MONOCYTES NFR BLD AUTO: 7.4 % (ref 5–12)
NEUTROPHILS # BLD AUTO: 5.45 10*3/MM3 (ref 1.7–7)
NEUTROPHILS NFR BLD AUTO: 76.2 % (ref 42.7–76)
NRBC BLD AUTO-RTO: 0 /100 WBC (ref 0–0.2)
PLATELET # BLD AUTO: 237 10*3/MM3 (ref 140–450)
PMV BLD AUTO: 10.6 FL (ref 6–12)
RBC # BLD AUTO: 3.61 10*6/MM3 (ref 3.77–5.28)
TIBC SERPL-MCNC: 302 MCG/DL (ref 298–536)
TRANSFERRIN SERPL-MCNC: 203 MG/DL (ref 200–360)
WBC NRBC COR # BLD: 7.15 10*3/MM3 (ref 3.4–10.8)

## 2020-02-19 PROCEDURE — 84155 ASSAY OF PROTEIN SERUM: CPT | Performed by: INTERNAL MEDICINE

## 2020-02-19 PROCEDURE — 83883 ASSAY NEPHELOMETRY NOT SPEC: CPT | Performed by: INTERNAL MEDICINE

## 2020-02-19 PROCEDURE — 86140 C-REACTIVE PROTEIN: CPT | Performed by: INTERNAL MEDICINE

## 2020-02-19 PROCEDURE — 86334 IMMUNOFIX E-PHORESIS SERUM: CPT | Performed by: INTERNAL MEDICINE

## 2020-02-19 PROCEDURE — 99214 OFFICE O/P EST MOD 30 MIN: CPT | Performed by: NURSE PRACTITIONER

## 2020-02-19 PROCEDURE — 84466 ASSAY OF TRANSFERRIN: CPT | Performed by: INTERNAL MEDICINE

## 2020-02-19 PROCEDURE — 82746 ASSAY OF FOLIC ACID SERUM: CPT | Performed by: INTERNAL MEDICINE

## 2020-02-19 PROCEDURE — 85025 COMPLETE CBC W/AUTO DIFF WBC: CPT | Performed by: INTERNAL MEDICINE

## 2020-02-19 PROCEDURE — 84165 PROTEIN E-PHORESIS SERUM: CPT | Performed by: INTERNAL MEDICINE

## 2020-02-19 PROCEDURE — 83540 ASSAY OF IRON: CPT | Performed by: INTERNAL MEDICINE

## 2020-02-19 PROCEDURE — 36415 COLL VENOUS BLD VENIPUNCTURE: CPT

## 2020-02-19 PROCEDURE — 82728 ASSAY OF FERRITIN: CPT | Performed by: INTERNAL MEDICINE

## 2020-02-19 PROCEDURE — 82784 ASSAY IGA/IGD/IGG/IGM EACH: CPT | Performed by: INTERNAL MEDICINE

## 2020-02-19 PROCEDURE — 82607 VITAMIN B-12: CPT | Performed by: INTERNAL MEDICINE

## 2020-02-19 NOTE — PROGRESS NOTES
Cari Harper  [id number]  1940  2/19/2020    Referring Provider:   ALYSA Arevalo    Reason for Follow Up:   Normocytic anemia  Iron deficiency anemia  Monoclonal gammopathy of undetermined significance (MGUS)     Chief Complaint:  Fatigue, generalized weakness    History of Present Illness   Cari Harper is a very pleasant 79 y.o.  female who presents in follow up appointment for further management and evaluation of normocytic anemia.    During her initial appointment Ms. Harper believed that her hemoglobin had been worsening for six months prior to her appointment with me. In review of her laboratory evaluation it appeared that she has had stable anemia with a hemoglobin in the 9 range since 2016. She had been more fatigued and dyspneic and recently underwent shoulder replacement surgery November 2018 and initially began following with me to help optimize her hemoglobin prior to surgery. She has never required a blood transfusion, and has been on oral ferrous sulfate 325mg once daily for one year and tolerating without any issues. She is also on Eliquis for history of atrial fibrillation and deep venous thrombosis several years ago. She denies of any abnormal or unusual bleeding. She believed her last colonoscopy was 2 or 3 years ago and was a routine colonoscopy and was normal as per family. She has continued to have stable anemia despite IV iron replacement. In workup for iron deficiency she was also found to have an elevated copper level despite stopping all multivitamins.      Treatment History:  IV injectafer: 10/23, 10/30/18      Interim History:  Ms. Harper presents today for follow up. Since her last visit, overall, she has been doing well. She has not required IV iron since September 2019. She remains on Coumadin which she is tolerating well and denies having bleeding from any source. Her main complaints today include ongoing fatigue and weakness particularly in BLE. She  denies any other complaints today.     No Known Allergies      Past Medical History:   Diagnosis Date   • Anxiety    • Arthritis    • Asthma    • Atrial fibrillation (CMS/HCC)    • Coronary artery disease    • Depression    • Disease of thyroid gland    • Diverticulitis    • DVT (deep venous thrombosis) (CMS/HCC)    • Eczema    • GERD (gastroesophageal reflux disease)    • HLD (hyperlipidemia) 2018   • Iron deficiency anemia    • On home oxygen therapy     2L at night   • Wears dentures    • Wears glasses    Denies of hypertension    Past Surgical History:   Procedure Laterality Date   • APPENDECTOMY     • CHOLECYSTECTOMY     • COLONOSCOPY     • COLONOSCOPY W/ BIOPSIES     • EYE SURGERY      cataracts   • TOTAL SHOULDER ARTHROPLASTY W/ DISTAL CLAVICLE EXCISION Right 2018    Procedure: RIGHT REVERSE TOTAL SHOULDER REPLACEMENT;  Surgeon: Robbie Silveira MD;  Location: [facility];  Service: Orthopedics   • TUBAL ABDOMINAL LIGATION         Social History     Socioeconomic History   • Marital status: Unknown     Spouse name: Not on file   • Number of children: Not on file   • Years of education: Not on file   • Highest education level: Not on file   Tobacco Use   • Smoking status: Former Smoker     Packs/day: 1.00     Years: 0.00     Pack years: 0.00     Types: Cigarettes     Start date: 1961     Last attempt to quit: 1986     Years since quittin.5   • Smokeless tobacco: Never Used   Substance and Sexual Activity   • Alcohol use: No   • Drug use: No   • Sexual activity: Defer   She is , lives by herself but her children live next to her. She was a previous smoker for 20-30 years and quit 30 years ago. No alcohol or illicit drug use. She is a .         Family History   Problem Relation Age of Onset   • Cancer Father    • No Known Problems Mother    Son -  at age of 49 from Sinus cancer    Son -  from Monica Gerhigs  Sister - brain tumor       Current Outpatient  Medications:   •  acetaminophen (TYLENOL) 500 MG tablet, Take 500 mg by mouth Every 6 (Six) Hours As Needed for Mild Pain ., Disp: , Rfl:   •  budesonide-formoterol (SYMBICORT) 160-4.5 MCG/ACT inhaler, Inhale 2 puffs 2 (Two) Times a Day., Disp: , Rfl:   •  bumetanide (BUMEX) 2 MG tablet, Take 1 tablet by mouth Daily., Disp: 30 tablet, Rfl: 11  •  clonazePAM (KlonoPIN) 1 MG tablet, Take 1 mg by mouth Every Night., Disp: , Rfl:   •  flecainide (TAMBOCOR) 50 MG tablet, Take 1 tablet by mouth Every 12 (Twelve) Hours., Disp: 60 tablet, Rfl: 11  •  FLUoxetine (PROzac) 20 MG capsule, Take 20 mg by mouth 2 (two) times a day., Disp: , Rfl:   •  loratadine (CLARITIN) 10 MG tablet, Take 10 mg by mouth Daily., Disp: , Rfl:   •  O2 (OXYGEN), Inhale 2 L/min Every Night., Disp: , Rfl:   •  Omega-3 Fatty Acids (FISH OIL) 1200 MG capsule capsule, Take 2,400 mg by mouth Every Evening., Disp: , Rfl:   •  omeprazole (priLOSEC) 20 MG capsule, Take 20 mg by mouth 2 (Two) Times a Day., Disp: , Rfl:   •  Polyethylene Glycol 3350 (CLEARLAX PO), Take  by mouth., Disp: , Rfl:   •  potassium chloride (K-DUR) 10 MEQ CR tablet, Take 1 tablet by mouth Daily., Disp: 30 tablet, Rfl: 11  •  Prenatal Vit-Fe Fumarate-FA (PRENATAL, CLASSIC, VITAMIN) 28-0.8 MG tablet tablet, Take  by mouth Daily., Disp: , Rfl:   •  sucralfate (CARAFATE) 1 g tablet, Take 1 g by mouth 2 (Two) Times a Day., Disp: , Rfl:   •  Thyroid 60 MG PO tablet, Take 60 mg by mouth Every Morning., Disp: , Rfl:   •  warfarin (COUMADIN) 5 MG tablet, Take 5 mg by mouth Daily. Takes 1/2 tablet daily, Disp: , Rfl:       Review of Systems  A comprehensive 14 point review of systems was performed.  Significant findings as mentioned above.  All other systems reviewed and are negative.     Physical Exam  Vital Signs: These were reviewed and listed as per patient’s electronic medical chart  Vitals:    02/19/20 1140   BP: 131/58   Pulse: 51   Resp: 20   Temp: 97.3 °F (36.3 °C)   SpO2: 94%      General: Awake, alert and oriented, in no distress, elderly, in a wheelchair  HEENT: Head is atraumatic, normocephalic, extraocular movements full, oropharynx clear, no scleral icterus, pink moist mucous membranes  Neck: supple, no jvd, lymphadenopathy or masses  Cardiovascular: S1, S2, no murmurs, rubs or gallops  Pulmonary: non-labored, clear to auscultation bilaterally, no wheezing  Abdomen: soft, non-tender, non-distended, normal active bowel sounds present  Extremities: No clubbing, cyanosis or edema  Lymph: No cervical, supraclavicular adenopathy  Neurologic: Mental status as above, alert, awake and oriented, grossly non-focal exam, in a wheelchair    Labs / Studies:    WBC   Date Value Ref Range Status   02/19/2020 7.15 3.40 - 10.80 10*3/mm3 Final     RBC   Date Value Ref Range Status   02/19/2020 3.61 (L) 3.77 - 5.28 10*6/mm3 Final     Hemoglobin   Date Value Ref Range Status   02/19/2020 9.6 (L) 12.0 - 15.9 g/dL Final     Hematocrit   Date Value Ref Range Status   02/19/2020 31.7 (L) 34.0 - 46.6 % Final     MCV   Date Value Ref Range Status   02/19/2020 87.8 79.0 - 97.0 fL Final     MCH   Date Value Ref Range Status   02/19/2020 26.6 26.6 - 33.0 pg Final     MCHC   Date Value Ref Range Status   02/19/2020 30.3 (L) 31.5 - 35.7 g/dL Final     RDW   Date Value Ref Range Status   02/19/2020 14.7 12.3 - 15.4 % Final     RDW-SD   Date Value Ref Range Status   02/19/2020 47.8 37.0 - 54.0 fl Final     MPV   Date Value Ref Range Status   02/19/2020 10.6 6.0 - 12.0 fL Final     Platelets   Date Value Ref Range Status   02/19/2020 237 140 - 450 10*3/mm3 Final     Neutrophil %   Date Value Ref Range Status   02/19/2020 76.2 (H) 42.7 - 76.0 % Final     Lymphocyte %   Date Value Ref Range Status   02/19/2020 14.8 (L) 19.6 - 45.3 % Final     Monocyte %   Date Value Ref Range Status   02/19/2020 7.4 5.0 - 12.0 % Final     Eosinophil %   Date Value Ref Range Status   02/19/2020 1.0 0.3 - 6.2 % Final     Basophil %   Date  Value Ref Range Status   2020 0.3 0.0 - 1.5 % Final     Immature Grans %   Date Value Ref Range Status   2020 0.3 0.0 - 0.5 % Final     Neutrophils, Absolute   Date Value Ref Range Status   2020 5.45 1.70 - 7.00 10*3/mm3 Final     Lymphocytes, Absolute   Date Value Ref Range Status   2020 1.06 0.70 - 3.10 10*3/mm3 Final     Monocytes, Absolute   Date Value Ref Range Status   2020 0.53 0.10 - 0.90 10*3/mm3 Final     Eosinophils, Absolute   Date Value Ref Range Status   2020 0.07 0.00 - 0.40 10*3/mm3 Final     Basophils, Absolute   Date Value Ref Range Status   2020 0.02 0.00 - 0.20 10*3/mm3 Final     Immature Grans, Absolute   Date Value Ref Range Status   2020 0.02 0.00 - 0.05 10*3/mm3 Final     nRBC   Date Value Ref Range Status   2020 0.0 0.0 - 0.2 /100 WBC Final     Lab Results   Component Value Date    GLUCOSE 122 (H) 2019    BUN 26 (H) 2019    CREATININE 0.86 2019    EGFRIFNONA 63 2019    BCR 30.2 (H) 2019    K 4.1 2019    CO2 28.5 2019    CALCIUM 9.1 2019    PROTENTOTREF 6.2 2019    ALBUMIN 3.96 2019    ALBUMIN 3.1 2019    LABIL2 1.1 2019    AST 13 2019    ALT 10 2019     Lab Results   Component Value Date    FERRITIN 352.90 (H) 2020    IRON 63 2020    TIBC 302 2020    LABIRON 21 2020    [id number] 765 01/10/2019    FOLATE >24.00 (H) 01/10/2019    HAPTOGLOBIN 298 (H) 10/19/2018    RETICCTPCT 1.23 10/19/2018    RETIC 0.0439 10/19/2018       PATHOLOGY:  18: Peripheral Smear:        18:              01/10/19:                        IMAGIN19: XR BONE SURVEY COMPLETE: Lytic lesions: None. Alignment: The bones are in normal anatomic alignment. There is no evidence of fracture or dislocation. Mineralization: Normal Joints: Right humeral arthroplasty. Degenerative changes. Degenerative disc disease: Degenerative changes of the cervical,  thoracic, and lumbar spines. Other findings: None IMPRESSION: Degenerative changes. No lytic bony lesions identified.                 Assessment/Plan   Cari Harper is a very pleasant 79 y.o.female with :     Normocytic anemia  Iron deficiency anemia    -Please see Dr. Hall's most recent follow up note from 8/21/19 for full details.   - She continued to have iron deficiency despite oral iron. Therefore she likely has malabsorption, possibly due to PPI, and have been replacing with IV iron as needed. Most recently, she received Feraheme in September 2019.   - B12 and folate levels show repletion.PBS was suggestive of underlying iron deficiency.   - LDH and retic count are normal thus less likely to be hemolysis, haptoglobin in fact elevated.  - Iron deficiency is likely due to erosive gastritis that was seen on EGD and she is currently on sucralfate. She also underwent colonoscopy and summarized above which showed no bleeding but noted internal hemorrhoids and diverticulosis.    - SPEP and serum free light chains were abnormal and further discussed below.  -CBC from today showing stable/improved Hg. Iron remains replete. Will continue to monitor an replace with IV iron as needed.     Monoclonal gammopathy of undetermined significance (MGUS)  - SPEP was significant for an abnormal immunofixation with an IgG monoclonal protein with lambda light specificity with an M-spike elevation at 0.3g/dL. While kappa light chain was minimally elevated the ratio was within normal limits.   - On repeat testing no M spike was seen on most recent SPEP. UPEP was essentially normal and presence of monoclonal protein that was previously seen is now unclear. While kappa light chain was mildly elevated the serum ratio was within normal limits. Skeletal survey was significant for degenerative changes otherwise no lytic bony lesions seen.  - Suspect that she likely has an MGUS based on her serum studies however given her ongoing  anemia, Dr. Hall had a long discussion with her and her sons that she would require a bone marrow biopsy to further assess the normocytic anemia and MGUS. She discussed this with her family and does not want to pursue bone marrow biopsy and would prefer to monitor her studies.  -Repeat SPEP/KEELEY and SFLC from today pending.   -Will monitor.   -She will follow up with Dr. Hall in 3 months with labs 1 week prior.     ACO / PILLO/Other  Quality measures  -  Cari Harper received 2019 flu vaccine.  -  Cari Harper reports a pain score of 0.   -  Current outpatient and discharge medications have been reconciled for the patient.  Reviewed by: Darcie Pope, ALYSA    I spent 25 minutes with Cari Beckerey today.  In the office today, more than 50% of this time was spent face-to-face with her  in counseling / coordination of care, reviewing her interim medical history and counseling on the current treatment plan.  All questions were answered to her satisfaction.         ALYSA Christensen  2/19/20  3:57 PM

## 2020-02-20 LAB
ALBUMIN SERPL-MCNC: 3.2 G/DL (ref 2.9–4.4)
ALBUMIN/GLOB SERPL: 1.1 {RATIO} (ref 0.7–1.7)
ALPHA1 GLOB FLD ELPH-MCNC: 0.3 G/DL (ref 0–0.4)
ALPHA2 GLOB SERPL ELPH-MCNC: 0.9 G/DL (ref 0.4–1)
B-GLOBULIN SERPL ELPH-MCNC: 1.2 G/DL (ref 0.7–1.3)
FOLATE SERPL-MCNC: >20 NG/ML (ref 4.78–24.2)
GAMMA GLOB SERPL ELPH-MCNC: 0.8 G/DL (ref 0.4–1.8)
GLOBULIN SER CALC-MCNC: 3.1 G/DL (ref 2.2–3.9)
IGA SERPL-MCNC: 307 MG/DL (ref 64–422)
IGG SERPL-MCNC: 745 MG/DL (ref 700–1600)
IGM SERPL-MCNC: 80 MG/DL (ref 26–217)
INTERPRETATION SERPL IEP-IMP: NORMAL
KAPPA LC SERPL-MCNC: 19.2 MG/L (ref 3.3–19.4)
KAPPA LC/LAMBDA SER: 1.54 {RATIO} (ref 0.26–1.65)
LAMBDA LC FREE SERPL-MCNC: 12.5 MG/L (ref 5.7–26.3)
Lab: NORMAL
M-SPIKE: NORMAL G/DL
PROT SERPL-MCNC: 6.3 G/DL (ref 6–8.5)
VIT B12 BLD-MCNC: >2000 PG/ML (ref 211–946)

## 2020-03-17 DIAGNOSIS — R06.02 SHORTNESS OF BREATH: ICD-10-CM

## 2020-03-17 DIAGNOSIS — I48.0 PAROXYSMAL ATRIAL FIBRILLATION (HCC): ICD-10-CM

## 2020-03-17 DIAGNOSIS — I50.9 CONGESTIVE HEART FAILURE (HCC): ICD-10-CM

## 2020-03-17 RX ORDER — FLECAINIDE ACETATE 50 MG/1
50 TABLET ORAL EVERY 12 HOURS SCHEDULED
Qty: 180 TABLET | Refills: 3 | Status: SHIPPED | OUTPATIENT
Start: 2020-03-17

## 2020-03-17 RX ORDER — POTASSIUM CHLORIDE 750 MG/1
10 TABLET, FILM COATED, EXTENDED RELEASE ORAL DAILY
Qty: 90 TABLET | Refills: 3 | Status: SHIPPED | OUTPATIENT
Start: 2020-03-17

## 2020-03-17 RX ORDER — BUMETANIDE 2 MG/1
2 TABLET ORAL DAILY
Qty: 90 TABLET | Refills: 3 | Status: SHIPPED | OUTPATIENT
Start: 2020-03-17

## 2020-05-04 ENCOUNTER — TELEPHONE (OUTPATIENT)
Dept: ONCOLOGY | Facility: CLINIC | Age: 80
End: 2020-05-04

## 2020-05-07 ENCOUNTER — LAB (OUTPATIENT)
Dept: ONCOLOGY | Facility: CLINIC | Age: 80
End: 2020-05-07

## 2020-05-07 DIAGNOSIS — D64.9 NORMOCYTIC ANEMIA: ICD-10-CM

## 2020-05-07 DIAGNOSIS — D50.9 IRON DEFICIENCY ANEMIA, UNSPECIFIED IRON DEFICIENCY ANEMIA TYPE: ICD-10-CM

## 2020-05-07 DIAGNOSIS — D47.2 MGUS (MONOCLONAL GAMMOPATHY OF UNKNOWN SIGNIFICANCE): ICD-10-CM

## 2020-05-07 LAB
ALBUMIN SERPL-MCNC: 3.74 G/DL (ref 3.5–5.2)
ALBUMIN/GLOB SERPL: 1.1 G/DL
ALP SERPL-CCNC: 145 U/L (ref 39–117)
ALT SERPL W P-5'-P-CCNC: 11 U/L (ref 1–33)
ANION GAP SERPL CALCULATED.3IONS-SCNC: 12.6 MMOL/L (ref 5–15)
AST SERPL-CCNC: 16 U/L (ref 1–32)
BASOPHILS # BLD AUTO: 0.04 10*3/MM3 (ref 0–0.2)
BASOPHILS NFR BLD AUTO: 0.6 % (ref 0–1.5)
BILIRUB SERPL-MCNC: 0.3 MG/DL (ref 0.2–1.2)
BUN BLD-MCNC: 20 MG/DL (ref 8–23)
BUN/CREAT SERPL: 20.2 (ref 7–25)
CALCIUM SPEC-SCNC: 9.3 MG/DL (ref 8.6–10.5)
CHLORIDE SERPL-SCNC: 96 MMOL/L (ref 98–107)
CO2 SERPL-SCNC: 32.4 MMOL/L (ref 22–29)
CREAT BLD-MCNC: 0.99 MG/DL (ref 0.57–1)
DEPRECATED RDW RBC AUTO: 51.3 FL (ref 37–54)
EOSINOPHIL # BLD AUTO: 0.19 10*3/MM3 (ref 0–0.4)
EOSINOPHIL NFR BLD AUTO: 2.6 % (ref 0.3–6.2)
ERYTHROCYTE [DISTWIDTH] IN BLOOD BY AUTOMATED COUNT: 16.4 % (ref 12.3–15.4)
FERRITIN SERPL-MCNC: 273.2 NG/ML (ref 13–150)
GFR SERPL CREATININE-BSD FRML MDRD: 54 ML/MIN/1.73
GLOBULIN UR ELPH-MCNC: 3.3 GM/DL
GLUCOSE BLD-MCNC: 112 MG/DL (ref 65–99)
HCT VFR BLD AUTO: 27.4 % (ref 34–46.6)
HGB BLD-MCNC: 7.9 G/DL (ref 12–15.9)
HYPOCHROMIA BLD QL: NORMAL
IMM GRANULOCYTES # BLD AUTO: 0.01 10*3/MM3 (ref 0–0.05)
IMM GRANULOCYTES NFR BLD AUTO: 0.1 % (ref 0–0.5)
IRON 24H UR-MRATE: 33 MCG/DL (ref 37–145)
IRON SATN MFR SERPL: 12 % (ref 20–50)
LYMPHOCYTES # BLD AUTO: 0.89 10*3/MM3 (ref 0.7–3.1)
LYMPHOCYTES NFR BLD AUTO: 12.3 % (ref 19.6–45.3)
MCH RBC QN AUTO: 25.1 PG (ref 26.6–33)
MCHC RBC AUTO-ENTMCNC: 28.8 G/DL (ref 31.5–35.7)
MCV RBC AUTO: 87 FL (ref 79–97)
MONOCYTES # BLD AUTO: 0.47 10*3/MM3 (ref 0.1–0.9)
MONOCYTES NFR BLD AUTO: 6.5 % (ref 5–12)
NEUTROPHILS # BLD AUTO: 5.61 10*3/MM3 (ref 1.7–7)
NEUTROPHILS NFR BLD AUTO: 77.9 % (ref 42.7–76)
NRBC BLD AUTO-RTO: 0 /100 WBC (ref 0–0.2)
OVALOCYTES BLD QL SMEAR: NORMAL
PLAT MORPH BLD: NORMAL
PLATELET # BLD AUTO: 267 10*3/MM3 (ref 140–450)
PMV BLD AUTO: 10.5 FL (ref 6–12)
POTASSIUM BLD-SCNC: 3.8 MMOL/L (ref 3.5–5.2)
PROT SERPL-MCNC: 7 G/DL (ref 6–8.5)
RBC # BLD AUTO: 3.15 10*6/MM3 (ref 3.77–5.28)
SODIUM BLD-SCNC: 141 MMOL/L (ref 136–145)
TIBC SERPL-MCNC: 286 MCG/DL (ref 298–536)
TRANSFERRIN SERPL-MCNC: 192 MG/DL (ref 200–360)
WBC NRBC COR # BLD: 7.21 10*3/MM3 (ref 3.4–10.8)

## 2020-05-07 PROCEDURE — 82728 ASSAY OF FERRITIN: CPT | Performed by: NURSE PRACTITIONER

## 2020-05-07 PROCEDURE — 85025 COMPLETE CBC W/AUTO DIFF WBC: CPT | Performed by: NURSE PRACTITIONER

## 2020-05-07 PROCEDURE — 80053 COMPREHEN METABOLIC PANEL: CPT | Performed by: NURSE PRACTITIONER

## 2020-05-07 PROCEDURE — 84466 ASSAY OF TRANSFERRIN: CPT | Performed by: NURSE PRACTITIONER

## 2020-05-07 PROCEDURE — 83883 ASSAY NEPHELOMETRY NOT SPEC: CPT | Performed by: NURSE PRACTITIONER

## 2020-05-07 PROCEDURE — 85007 BL SMEAR W/DIFF WBC COUNT: CPT | Performed by: NURSE PRACTITIONER

## 2020-05-07 PROCEDURE — 83540 ASSAY OF IRON: CPT | Performed by: NURSE PRACTITIONER

## 2020-05-07 PROCEDURE — 84165 PROTEIN E-PHORESIS SERUM: CPT | Performed by: NURSE PRACTITIONER

## 2020-05-07 PROCEDURE — 86334 IMMUNOFIX E-PHORESIS SERUM: CPT | Performed by: NURSE PRACTITIONER

## 2020-05-07 PROCEDURE — 82784 ASSAY IGA/IGD/IGG/IGM EACH: CPT | Performed by: NURSE PRACTITIONER

## 2020-05-08 LAB
ALBUMIN SERPL-MCNC: 3.1 G/DL (ref 2.9–4.4)
ALBUMIN/GLOB SERPL: 1 {RATIO} (ref 0.7–1.7)
ALPHA1 GLOB FLD ELPH-MCNC: 0.4 G/DL (ref 0–0.4)
ALPHA2 GLOB SERPL ELPH-MCNC: 1 G/DL (ref 0.4–1)
B-GLOBULIN SERPL ELPH-MCNC: 1.2 G/DL (ref 0.7–1.3)
GAMMA GLOB SERPL ELPH-MCNC: 0.8 G/DL (ref 0.4–1.8)
GLOBULIN SER CALC-MCNC: 3.4 G/DL (ref 2.2–3.9)
IGA SERPL-MCNC: 379 MG/DL (ref 64–422)
IGG SERPL-MCNC: 771 MG/DL (ref 586–1602)
IGM SERPL-MCNC: 98 MG/DL (ref 26–217)
INTERPRETATION SERPL IEP-IMP: NORMAL
KAPPA LC SERPL-MCNC: 40.9 MG/L (ref 3.3–19.4)
KAPPA LC/LAMBDA SER: 1.39 {RATIO} (ref 0.26–1.65)
LAMBDA LC FREE SERPL-MCNC: 29.5 MG/L (ref 5.7–26.3)
Lab: NORMAL
M-SPIKE: NORMAL G/DL
PROT SERPL-MCNC: 6.5 G/DL (ref 6–8.5)

## 2020-05-14 ENCOUNTER — TRANSCRIBE ORDERS (OUTPATIENT)
Dept: INFUSION THERAPY | Facility: HOSPITAL | Age: 80
End: 2020-05-14

## 2020-05-14 ENCOUNTER — OFFICE VISIT (OUTPATIENT)
Dept: ONCOLOGY | Facility: CLINIC | Age: 80
End: 2020-05-14

## 2020-05-14 VITALS
WEIGHT: 182 LBS | OXYGEN SATURATION: 91 % | TEMPERATURE: 96.2 F | BODY MASS INDEX: 34.39 KG/M2 | DIASTOLIC BLOOD PRESSURE: 72 MMHG | SYSTOLIC BLOOD PRESSURE: 119 MMHG | RESPIRATION RATE: 18 BRPM | HEART RATE: 94 BPM

## 2020-05-14 DIAGNOSIS — D64.9 NORMOCYTIC ANEMIA: Primary | ICD-10-CM

## 2020-05-14 DIAGNOSIS — D47.2 MGUS (MONOCLONAL GAMMOPATHY OF UNKNOWN SIGNIFICANCE): ICD-10-CM

## 2020-05-14 DIAGNOSIS — D64.9 ANEMIA, UNSPECIFIED TYPE: Primary | ICD-10-CM

## 2020-05-14 DIAGNOSIS — R63.0 DECREASED APPETITE: ICD-10-CM

## 2020-05-14 DIAGNOSIS — D50.9 IRON DEFICIENCY ANEMIA, UNSPECIFIED IRON DEFICIENCY ANEMIA TYPE: ICD-10-CM

## 2020-05-14 LAB
ABO GROUP BLD: NORMAL
BASOPHILS # BLD AUTO: 0.04 10*3/MM3 (ref 0–0.2)
BASOPHILS NFR BLD AUTO: 0.5 % (ref 0–1.5)
BLD GP AB SCN SERPL QL: NEGATIVE
DEPRECATED RDW RBC AUTO: 50.9 FL (ref 37–54)
EOSINOPHIL # BLD AUTO: 0.31 10*3/MM3 (ref 0–0.4)
EOSINOPHIL NFR BLD AUTO: 4.2 % (ref 0.3–6.2)
ERYTHROCYTE [DISTWIDTH] IN BLOOD BY AUTOMATED COUNT: 16.2 % (ref 12.3–15.4)
HCT VFR BLD AUTO: 25.9 % (ref 34–46.6)
HGB BLD-MCNC: 7.6 G/DL (ref 12–15.9)
IMM GRANULOCYTES # BLD AUTO: 0.02 10*3/MM3 (ref 0–0.05)
IMM GRANULOCYTES NFR BLD AUTO: 0.3 % (ref 0–0.5)
LYMPHOCYTES # BLD AUTO: 0.84 10*3/MM3 (ref 0.7–3.1)
LYMPHOCYTES NFR BLD AUTO: 11.5 % (ref 19.6–45.3)
MCH RBC QN AUTO: 25.3 PG (ref 26.6–33)
MCHC RBC AUTO-ENTMCNC: 29.3 G/DL (ref 31.5–35.7)
MCV RBC AUTO: 86.3 FL (ref 79–97)
MONOCYTES # BLD AUTO: 0.63 10*3/MM3 (ref 0.1–0.9)
MONOCYTES NFR BLD AUTO: 8.6 % (ref 5–12)
NEUTROPHILS # BLD AUTO: 5.46 10*3/MM3 (ref 1.7–7)
NEUTROPHILS NFR BLD AUTO: 74.9 % (ref 42.7–76)
NRBC BLD AUTO-RTO: 0 /100 WBC (ref 0–0.2)
PLATELET # BLD AUTO: 258 10*3/MM3 (ref 140–450)
PMV BLD AUTO: 10.4 FL (ref 6–12)
RBC # BLD AUTO: 3 10*6/MM3 (ref 3.77–5.28)
RH BLD: NEGATIVE
T&S EXPIRATION DATE: NORMAL
WBC NRBC COR # BLD: 7.3 10*3/MM3 (ref 3.4–10.8)

## 2020-05-14 PROCEDURE — 85025 COMPLETE CBC W/AUTO DIFF WBC: CPT | Performed by: INTERNAL MEDICINE

## 2020-05-14 PROCEDURE — 86920 COMPATIBILITY TEST SPIN: CPT

## 2020-05-14 PROCEDURE — 86901 BLOOD TYPING SEROLOGIC RH(D): CPT | Performed by: INTERNAL MEDICINE

## 2020-05-14 PROCEDURE — 86900 BLOOD TYPING SEROLOGIC ABO: CPT | Performed by: INTERNAL MEDICINE

## 2020-05-14 PROCEDURE — 86850 RBC ANTIBODY SCREEN: CPT | Performed by: INTERNAL MEDICINE

## 2020-05-14 PROCEDURE — 99214 OFFICE O/P EST MOD 30 MIN: CPT | Performed by: INTERNAL MEDICINE

## 2020-05-14 RX ORDER — DIPHENHYDRAMINE HCL 25 MG
25 CAPSULE ORAL ONCE
Status: CANCELLED | OUTPATIENT
Start: 2020-05-14 | End: 2020-05-14

## 2020-05-14 RX ORDER — ACETAMINOPHEN 325 MG/1
650 TABLET ORAL ONCE
Status: CANCELLED | OUTPATIENT
Start: 2020-05-14 | End: 2020-05-14

## 2020-05-14 RX ORDER — DRONABINOL 2.5 MG/1
2.5 CAPSULE ORAL
Qty: 60 CAPSULE | Refills: 0 | Status: SHIPPED | OUTPATIENT
Start: 2020-05-14

## 2020-05-14 RX ORDER — SODIUM CHLORIDE 9 MG/ML
250 INJECTION, SOLUTION INTRAVENOUS AS NEEDED
Status: CANCELLED | OUTPATIENT
Start: 2020-05-14

## 2020-05-14 NOTE — PROGRESS NOTES
Cari Harper  [id number]  1940  5/14/2020      Referring Provider:   ALYSA Arevalo    Reason for Follow Up:   Normocytic anemia  Iron deficiency anemia  Monoclonal gammopathy of undetermined significance (MGUS)     Chief Complaint:  Fatigue      History of Present Illness   Cari Harper is a very pleasant 79 y.o.  female who presents in follow up appointment for further management and evaluation of normocytic anemia.    During her initial appointment Ms. Harper believed that her hemoglobin had been worsening for six months prior to her appointment with me. In review of her laboratory evaluation it appeared that she has had stable anemia with a hemoglobin in the 9 range since 2016. She had been more fatigued and dyspneic and recently underwent shoulder replacement surgery November 2018 and initially began following with me to help optimize her hemoglobin prior to surgery. She has never required a blood transfusion, and has been on oral ferrous sulfate 325mg once daily for one year and tolerating without any issues. She is also on Eliquis for history of atrial fibrillation and deep venous thrombosis several years ago however this was later changed to Warfarin due to cost. She denies of any abnormal or unusual bleeding. She believed her last colonoscopy was 2 or 3 years ago and was a routine colonoscopy and was normal as per family. She has continued to have stable anemia despite IV iron replacement. In workup for iron deficiency she was also found to have an elevated copper level despite stopping all multivitamins. She has been intermittently taking oral iron as she has intermittently been receiving IV iron. She was recently seen by Dr. Contreras who performed an EGD and was significant for erosive gastritis which was felt to possibly be the cause for her iron deficiency. She also had a colonoscopy with polyps removed.    Treatment History:  IV injectafer: 10/23, 10/30/18  IV Feraheme:  , 19      Interim History:  Since her last visit she was hospitalized at [facility] for fluid overload per her son who is with her today. During that admission she did require blood transfusion. She denies of any bleeding. Her biggest complaint today is worsening lower extremity edema since she has been home.       The following portions of the patient's history were reviewed and updated as appropriate: allergies, current medications, past family history, past medical history, past social history, past surgical history and problem list.    No Known Allergies      Past Medical History:   Diagnosis Date   • Anxiety    • Arthritis    • Asthma    • Atrial fibrillation (CMS/HCC)    • Coronary artery disease    • Depression    • Disease of thyroid gland    • Diverticulitis    • DVT (deep venous thrombosis) (CMS/HCC)    • Eczema    • GERD (gastroesophageal reflux disease)    • HLD (hyperlipidemia) 2018   • Iron deficiency anemia    • On home oxygen therapy     2L at night   • Wears dentures    • Wears glasses    Denies of hypertension    Past Surgical History:   Procedure Laterality Date   • APPENDECTOMY     • CHOLECYSTECTOMY     • COLONOSCOPY     • COLONOSCOPY W/ BIOPSIES     • EYE SURGERY      cataracts   • TOTAL SHOULDER ARTHROPLASTY W/ DISTAL CLAVICLE EXCISION Right 2018    Procedure: RIGHT REVERSE TOTAL SHOULDER REPLACEMENT;  Surgeon: Robbie Silveira MD;  Location: [facility];  Service: Orthopedics   • TUBAL ABDOMINAL LIGATION         Social History     Socioeconomic History   • Marital status: Unknown     Spouse name: Not on file   • Number of children: Not on file   • Years of education: Not on file   • Highest education level: Not on file   Tobacco Use   • Smoking status: Former Smoker     Packs/day: 1.00     Years: 0.00     Pack years: 0.00     Types: Cigarettes     Start date: 1961     Last attempt to quit: 1986     Years since quittin.7   • Smokeless tobacco:  Never Used   Substance and Sexual Activity   • Alcohol use: No   • Drug use: No   • Sexual activity: Defer   She is , lives by herself but her children live next to her. She was a previous smoker for 20-30 years and quit 30 years ago. No alcohol or illicit drug use. She is a .         Family History   Problem Relation Age of Onset   • Cancer Father    • No Known Problems Mother    Son -  at age of 49 from Sinus cancer    Son -  from [facility]  Sister - brain tumor       Current Outpatient Medications:   •  acetaminophen (TYLENOL) 500 MG tablet, Take 500 mg by mouth Every 6 (Six) Hours As Needed for Mild Pain ., Disp: , Rfl:   •  budesonide-formoterol (SYMBICORT) 160-4.5 MCG/ACT inhaler, Inhale 2 puffs 2 (Two) Times a Day., Disp: , Rfl:   •  bumetanide (BUMEX) 2 MG tablet, Take 1 tablet by mouth Daily., Disp: 90 tablet, Rfl: 3  •  clonazePAM (KlonoPIN) 1 MG tablet, Take 1 mg by mouth Every Night., Disp: , Rfl:   •  flecainide (TAMBOCOR) 50 MG tablet, Take 1 tablet by mouth Every 12 (Twelve) Hours., Disp: 180 tablet, Rfl: 3  •  FLUoxetine (PROzac) 20 MG capsule, Take 20 mg by mouth 2 (two) times a day., Disp: , Rfl:   •  loratadine (CLARITIN) 10 MG tablet, Take 10 mg by mouth Daily., Disp: , Rfl:   •  O2 (OXYGEN), Inhale 2 L/min Every Night., Disp: , Rfl:   •  Omega-3 Fatty Acids (FISH OIL) 1200 MG capsule capsule, Take 2,400 mg by mouth Every Evening., Disp: , Rfl:   •  omeprazole (priLOSEC) 20 MG capsule, Take 20 mg by mouth 2 (Two) Times a Day., Disp: , Rfl:   •  Polyethylene Glycol 3350 (CLEARLAX PO), Take  by mouth., Disp: , Rfl:   •  potassium chloride (K-DUR) 10 MEQ CR tablet, Take 1 tablet by mouth Daily., Disp: 90 tablet, Rfl: 3  •  Prenatal Vit-Fe Fumarate-FA (PRENATAL, CLASSIC, VITAMIN) 28-0.8 MG tablet tablet, Take  by mouth Daily., Disp: , Rfl:   •  sucralfate (CARAFATE) 1 g tablet, Take 1 g by mouth 2 (Two) Times a Day., Disp: , Rfl:   •  Thyroid 60 MG PO tablet, Take  60 mg by mouth Every Morning., Disp: , Rfl:   •  warfarin (COUMADIN) 5 MG tablet, Take 5 mg by mouth Daily. Takes 1/2 tablet daily, Disp: , Rfl:         Review of Systems  A comprehensive 14 point review of systems was performed.  Significant findings as mentioned above.  All other systems reviewed and are negative with exception of ongoing knee pain and fatigue.        Physical Exam  Vital Signs: These were reviewed and listed as per patient’s electronic medical chart  Vitals:    05/14/20 1104   BP: 119/72   Pulse: 94   Resp: 18   Temp: 96.2 °F (35.7 °C)   SpO2: 91%     General: Awake, alert and oriented, in no distress, elderly, in a wheelchair, chronically ill appearing  HEENT: Head is atraumatic, normocephalic, extraocular movements full, oropharynx clear, no scleral icterus, pink moist mucous membranes  Neck: supple, no jvd, lymphadenopathy or masses  Cardiovascular: regular rate and rhythm without murmurs, rubs or gallops  Pulmonary: non-labored, crackles at bases  Abdomen: soft, non-tender, non-distended, normal active bowel sounds present  Extremities: No clubbing, cyanosis, bilateral pitting lower extremity edema  Lymph: No cervical, supraclavicular adenopathy  Neurologic: Mental status as above, alert, awake and oriented, grossly non-focal exam, in a wheelchair  Skin: warm, dry, intact, positive ecchymosis on extremities  Patient was examined on 05/14/20 and changes are reflected and noted above.      Labs / Studies:    Office Visit on 05/14/2020   Component Date Value   • WBC 05/14/2020 7.30    • RBC 05/14/2020 3.00*   • Hemoglobin 05/14/2020 7.6*   • Hematocrit 05/14/2020 25.9*   • MCV 05/14/2020 86.3    • MCH 05/14/2020 25.3*   • MCHC 05/14/2020 29.3*   • RDW 05/14/2020 16.2*   • RDW-SD 05/14/2020 50.9    • MPV 05/14/2020 10.4    • Platelets 05/14/2020 258    • Neutrophil % 05/14/2020 74.9    • Lymphocyte % 05/14/2020 11.5*   • Monocyte % 05/14/2020 8.6    • Eosinophil % 05/14/2020 4.2    • Basophil %  05/14/2020 0.5    • Immature Grans % 05/14/2020 0.3    • Neutrophils, Absolute 05/14/2020 5.46    • Lymphocytes, Absolute 05/14/2020 0.84    • Monocytes, Absolute 05/14/2020 0.63    • Eosinophils, Absolute 05/14/2020 0.31    • Basophils, Absolute 05/14/2020 0.04    • Immature Grans, Absolute 05/14/2020 0.02    • nRBC 05/14/2020 0.0    Lab on 05/07/2020   Component Date Value   • Glucose 05/07/2020 112*   • BUN 05/07/2020 20    • Creatinine 05/07/2020 0.99    • Sodium 05/07/2020 141    • Potassium 05/07/2020 3.8    • Chloride 05/07/2020 96*   • CO2 05/07/2020 32.4*   • Calcium 05/07/2020 9.3    • Total Protein 05/07/2020 7.0    • Albumin 05/07/2020 3.74    • ALT (SGPT) 05/07/2020 11    • AST (SGOT) 05/07/2020 16    • Alkaline Phosphatase 05/07/2020 145*   • Total Bilirubin 05/07/2020 0.3    • eGFR Non African Amer 05/07/2020 54*   • Globulin 05/07/2020 3.3    • A/G Ratio 05/07/2020 1.1    • BUN/Creatinine Ratio 05/07/2020 20.2    • Anion Gap 05/07/2020 12.6    • Ferritin 05/07/2020 273.20*   • Iron 05/07/2020 33*   • Iron Saturation 05/07/2020 12*   • Transferrin 05/07/2020 192*   • TIBC 05/07/2020 286*   • IgG 05/07/2020 771    • IgA 05/07/2020 379    • IgM 05/07/2020 98    • Total Protein 05/07/2020 6.5    • Albumin 05/07/2020 3.1    • Alpha-1-Globulin 05/07/2020 0.4    • Alpha-2-Globulin 05/07/2020 1.0    • Beta Globulin 05/07/2020 1.2    • Gamma Globulin 05/07/2020 0.8    • M-Jame 05/07/2020 Not Observed    • Globulin 05/07/2020 3.4    • A/G Ratio 05/07/2020 1.0    • Immunofixation Reflex, S* 05/07/2020 Comment    • Please note 05/07/2020 Comment    • Free Light Chain, Kappa 05/07/2020 40.9*   • Free Lambda Light Chains 05/07/2020 29.5*   • Kappa/Lambda Ratio 05/07/2020 1.39    • WBC 05/07/2020 7.21    • RBC 05/07/2020 3.15*   • Hemoglobin 05/07/2020 7.9*   • Hematocrit 05/07/2020 27.4*   • MCV 05/07/2020 87.0    • MCH 05/07/2020 25.1*   • MCHC 05/07/2020 28.8*   • RDW 05/07/2020 16.4*   • RDW-SD 05/07/2020 51.3     • MPV 05/07/2020 10.5    • Platelets 05/07/2020 267    • Neutrophil % 05/07/2020 77.9*   • Lymphocyte % 05/07/2020 12.3*   • Monocyte % 05/07/2020 6.5    • Eosinophil % 05/07/2020 2.6    • Basophil % 05/07/2020 0.6    • Immature Grans % 05/07/2020 0.1    • Neutrophils, Absolute 05/07/2020 5.61    • Lymphocytes, Absolute 05/07/2020 0.89    • Monocytes, Absolute 05/07/2020 0.47    • Eosinophils, Absolute 05/07/2020 0.19    • Basophils, Absolute 05/07/2020 0.04    • Immature Grans, Absolute 05/07/2020 0.01    • nRBC 05/07/2020 0.0    • Hypochromia 05/07/2020 Mod/2+    • Ovalocytes 05/07/2020 Slight/1+    • Platelet Morphology 05/07/2020 Normal    Lab on 02/19/2020   Component Date Value   • Iron 02/19/2020 63    • Iron Saturation 02/19/2020 21    • Transferrin 02/19/2020 203    • TIBC 02/19/2020 302    • Ferritin 02/19/2020 352.90*   • C-Reactive Protein 02/19/2020 0.55*   • Vitamin B-12 02/19/2020 >2,000*   • Folate 02/19/2020 >20.00    • IgG 02/19/2020 745    • IgA 02/19/2020 307    • IgM 02/19/2020 80    • Total Protein 02/19/2020 6.3    • Albumin 02/19/2020 3.2    • Alpha-1-Globulin 02/19/2020 0.3    • Alpha-2-Globulin 02/19/2020 0.9    • Beta Globulin 02/19/2020 1.2    • Gamma Globulin 02/19/2020 0.8    • M-Jame 02/19/2020 Not Observed    • Globulin 02/19/2020 3.1    • A/G Ratio 02/19/2020 1.1    • Immunofixation Reflex, S* 02/19/2020 Comment    • Please note 02/19/2020 Comment    • Free Light Chain, [employer] 02/19/2020 19.2    • Free Lambda Light Chains 02/19/2020 12.5    • Kappa/Lambda Ratio 02/19/2020 1.54    • WBC 02/19/2020 7.15    • RBC 02/19/2020 3.61*   • Hemoglobin 02/19/2020 9.6*   • Hematocrit 02/19/2020 31.7*   • MCV 02/19/2020 87.8    • MCH 02/19/2020 26.6    • MCHC 02/19/2020 30.3*   • RDW 02/19/2020 14.7    • RDW-SD 02/19/2020 47.8    • MPV 02/19/2020 10.6    • Platelets 02/19/2020 237    • Neutrophil % 02/19/2020 76.2*   • Lymphocyte % 02/19/2020 14.8*   • Monocyte % 02/19/2020 7.4    • Eosinophil  % 2020 1.0    • Basophil % 2020 0.3    • Immature Grans % 2020 0.3    • Neutrophils, Absolute 2020 5.45    • Lymphocytes, Absolute 2020 1.06    • Monocytes, Absolute 2020 0.53    • Eosinophils, Absolute 2020 0.07    • Basophils, Absolute 2020 0.02    • Immature Grans, Absolute 2020 0.02    • nRBC 2020 0.0    Lab on 2019   Component Date Value   • Iron 2019 25*   • Iron Saturation 2019 10*   • Transferrin 2019 174*   • TIBC 2019 259*   • Ferritin 2019 508.70*   • C-Reactive Protein 2019 9.66*   • WBC 2019 7.75    • RBC 2019 3.27*   • Hemoglobin 2019 8.9*   • Hematocrit 2019 28.4*   • MCV 2019 86.9    • MCH 2019 27.2    • MCHC 2019 31.3*   • RDW 2019 14.9    • RDW-SD 2019 47.9    • MPV 2019 9.9    • Platelets 2019 283    • Neutrophil % 2019 74.5    • Lymphocyte % 2019 15.0*   • Monocyte % 2019 8.5    • Eosinophil % 2019 1.2    • Basophil % 2019 0.4    • Immature Grans % 2019 0.4    • Neutrophils, Absolute 2019 5.78    • Lymphocytes, Absolute 2019 1.16    • Monocytes, Absolute 2019 0.66    • Eosinophils, Absolute 2019 0.09    • Basophils, Absolute 2019 0.03    • Immature Grans, Absolute 2019 0.03    • nRBC 2019 0.0         PATHOLOGY:  18: Peripheral Smear:        18:              01/10/19:                        IMAGIN19: XR BONE SURVEY COMPLETE: Lytic lesions: None. Alignment: The bones are in normal anatomic alignment. There is no evidence of fracture or dislocation. Mineralization: Normal Joints: Right humeral arthroplasty. Degenerative changes. Degenerative disc disease: Degenerative changes of the cervical, thoracic, and lumbar spines. Other findings: None IMPRESSION: Degenerative changes. No lytic bony lesions identified.                                    Assessment/Plan   Cari Harper is a very pleasant 80 y.o.  female who presents in follow up appointment for further management and evaluation of normocytic and iron deficiency anemia.     Normocytic anemia  Iron deficiency anemia  - We previously had spent a great deal of time discussing the differential for anemia and treatment for various diagnosis.  - She continued to have an iron deficiency despite oral iron therefore I suspect she likely has malabsorption, possibly due to PPI, and therefore she was started on IV injectafer. Tissue transglutaminase level and zinc were obtained to further evaluate iron deficiency and were normal. However copper level was found to be elevated and and further discussed below.   - B12 and folate levels show repletion  - LDH and retic count are normal thus less likely to be hemolysis, haptoglobin in fact elevated  - Her iron levels are more consistent with anemia of chronic inflammation. She previously had an iron deficiency that was likely due to erosive gastritis seen on EGD treated with sucralfate. If she continues to have declining hemoglobin she was advised to discuss the risk and benefits of anticoagulation with her cardiologist and primary provider.   - Given the ongoing anemia I did also obtain a peripheral smear which was significant for a normocytic anemia with frequent microcytes suggestive of underlying iron deficiency.   - SPEP and serum free light chains were abnormal and further discussed below.  - Will transfuse with one unit of blood today due to symptomatic anemia, will obtain records from her most recent hospitalization.   - Given her worsening anemia we discussed bone marrow biopsy and aspirate however she does not want to pursue and she would not want any treatment for any hematological malignancy. Therefore will continue to monitor CBC monthly and periodically check iron levels.     Monoclonal gammopathy of undetermined significance  (MGUS)  - SPEP was significant for an abnormal immunofixation with an IgG monoclonal protein with lambda light specificity with an M-spike elevation at 0.3g/dL. While kappa light chain was minimally elevated the ratio was within normal limits.   - On repeat testing no M spike was seen on most recent SPEP. UPEP was essentially normal and presence of monoclonal protein that was previously seen is now unclear. While kappa light chain was mildly elevated the serum ratio was within normal limits. Skeletal survey was significant for degenerative changes otherwise no lytic bony lesions seen.  - I suspect that she likely has an MGUS based on her serum studies however given her ongoing anemia I again had a long discussion with her and her sons that she would require a bone marrow biopsy to further assess the normocytic anemia and MGUS. We previously discussed bone marrow biopsy and aspirate procedure as well as its risks. She discussed this with her family and does not want to pursue bone marrow biopsy and would prefer to monitor her studies.  - Therefore will continue to monitor MGUS serum studies every 6 months to one year.    Low appetite  - Given her history of thrombosis will start Marinol over Megace. She was advised of possible side effects and agreeable to try.     ACO / PILLO/Other  Quality measures  -  Cari Harper received 2019 flu vaccine.  -  Cari Harper reports a pain score of 2. Given her pain assessment as noted, treatment options were discussed and the following options were decided upon as a follow-up plan to address the patient's pain: continuation of current treatment plan for pain. She has an appointment with her primary today.  -  Current outpatient and discharge medications have been reconciled for the patient.  Reviewed by: Inés Hall MD      I will have the patient return for laboratory evaluation once a month (CBC) and in follow up appointment in three months. She understands that should  she have any questions or concerns prior to her appointment she should give us a call at any time and I would be happy to see her sooner. It was a pleasure to see this patient in clinic today, thank you for allowing me to participate in the care of this patient.    Inés Hall MD  05/14/2020  13:46

## 2020-05-15 ENCOUNTER — HOSPITAL ENCOUNTER (OUTPATIENT)
Dept: INFUSION THERAPY | Facility: HOSPITAL | Age: 80
Discharge: HOME | End: 2020-05-15
Admitting: INTERNAL MEDICINE

## 2020-05-15 VITALS
DIASTOLIC BLOOD PRESSURE: 57 MMHG | RESPIRATION RATE: 18 BRPM | TEMPERATURE: 97.7 F | HEART RATE: 98 BPM | SYSTOLIC BLOOD PRESSURE: 129 MMHG

## 2020-05-15 DIAGNOSIS — D64.9 NORMOCYTIC ANEMIA: ICD-10-CM

## 2020-05-15 PROCEDURE — A9270 NON-COVERED ITEM OR SERVICE: HCPCS | Performed by: INTERNAL MEDICINE

## 2020-05-15 PROCEDURE — P9016 RBC LEUKOCYTES REDUCED: HCPCS

## 2020-05-15 PROCEDURE — 63710000001 ACETAMINOPHEN 325 MG TABLET: Performed by: INTERNAL MEDICINE

## 2020-05-15 PROCEDURE — 36430 TRANSFUSION BLD/BLD COMPNT: CPT

## 2020-05-15 PROCEDURE — 86900 BLOOD TYPING SEROLOGIC ABO: CPT

## 2020-05-15 PROCEDURE — 63710000001 DIPHENHYDRAMINE PER 50 MG: Performed by: INTERNAL MEDICINE

## 2020-05-15 RX ORDER — ACETAMINOPHEN 325 MG/1
650 TABLET ORAL ONCE
Status: COMPLETED | OUTPATIENT
Start: 2020-05-15 | End: 2020-05-15

## 2020-05-15 RX ORDER — SODIUM CHLORIDE 9 MG/ML
250 INJECTION, SOLUTION INTRAVENOUS AS NEEDED
Status: DISCONTINUED | OUTPATIENT
Start: 2020-05-15 | End: 2020-05-17 | Stop reason: HOSPADM

## 2020-05-15 RX ORDER — DIPHENHYDRAMINE HCL 25 MG
25 CAPSULE ORAL ONCE
Status: COMPLETED | OUTPATIENT
Start: 2020-05-15 | End: 2020-05-15

## 2020-05-15 RX ADMIN — DIPHENHYDRAMINE HYDROCHLORIDE 25 MG: 25 CAPSULE ORAL at 10:32

## 2020-05-15 RX ADMIN — ACETAMINOPHEN 650 MG: 325 TABLET ORAL at 10:32

## 2020-05-15 NOTE — PATIENT INSTRUCTIONS
Diphenhydramine capsules or tablets  What is this medicine?  DIPHENHYDRAMINE (dye nicolle ANAIS corea) is an antihistamine. It is used to treat the symptoms of an allergic reaction. It is also used to treat Parkinson's disease. This medicine is also used to prevent and to treat motion sickness and as a nighttime sleep aid.  This medicine may be used for other purposes; ask your health care provider or pharmacist if you have questions.  COMMON BRAND NAME(S): Ana-Chevak Plus Allergy, Aller-G-Time, Banophen, Benadryl Allergy, Benadryl Allergy Dye Free, Benadryl Allergy Kapgel, Benadryl Allergy Ultratab, Diphedryl, Diphenhist, Genahist, Amparo-Dryl, PHARBEDRYL, Q-Dryl, Sleepinal, Valu-Dryl, Vicks ZzzQuil Nightime Sleep-Aid  What should I tell my health care provider before I take this medicine?  They need to know if you have any of these conditions:  -asthma or lung disease  -glaucoma  -high blood pressure or heart disease  -liver disease  -pain or difficulty passing urine  -prostate trouble  -ulcers or other stomach problems  -an unusual or allergic reaction to diphenhydramine, other medicines foods, dyes, or preservatives such as sulfites  -pregnant or trying to get pregnant  -breast-feeding  How should I use this medicine?  Take this medicine by mouth with a full glass of water. Follow the directions on the prescription label. Take your doses at regular intervals. Do not take your medicine more often than directed. To prevent motion sickness start taking this medicine 30 to 60 minutes before you leave.  Talk to your pediatrician regarding the use of this medicine in children. Special care may be needed.  Patients over 65 years old may have a stronger reaction and need a smaller dose.  Overdosage: If you think you have taken too much of this medicine contact a poison control center or emergency room at once.  NOTE: This medicine is only for you. Do not share this medicine with others.  What if I miss a dose?  If you miss  a dose, take it as soon as you can. If it is almost time for your next dose, take only that dose. Do not take double or extra doses.  What may interact with this medicine?  Do not take this medicine with any of the following medications:  -MAOIs like Carbex, Eldepryl, Marplan, Nardil, and Parnate  This medicine may also interact with the following medications:  -alcohol  -barbiturates, like phenobarbital  -medicines for bladder spasm like oxybutynin, tolterodine  -medicines for blood pressure  -medicines for depression, anxiety, or psychotic disturbances  -medicines for movement abnormalities or Parkinson's disease  -medicines for sleep  -other medicines for cold, cough or allergy  -some medicines for the stomach like chlordiazepoxide, dicyclomine  This list may not describe all possible interactions. Give your health care provider a list of all the medicines, herbs, non-prescription drugs, or dietary supplements you use. Also tell them if you smoke, drink alcohol, or use illegal drugs. Some items may interact with your medicine.  What should I watch for while using this medicine?  Visit your doctor or health care professional for regular check ups. Tell your doctor if your symptoms do not improve or if they get worse.  Your mouth may get dry. Chewing sugarless gum or sucking hard candy, and drinking plenty of water may help. Contact your doctor if the problem does not go away or is severe.  This medicine may cause dry eyes and blurred vision. If you wear contact lenses you may feel some discomfort. Lubricating drops may help. See your eye doctor if the problem does not go away or is severe.  You may get drowsy or dizzy. Do not drive, use machinery, or do anything that needs mental alertness until you know how this medicine affects you. Do not stand or sit up quickly, especially if you are an older patient. This reduces the risk of dizzy or fainting spells. Alcohol may interfere with the effect of this medicine.  Avoid alcoholic drinks.  What side effects may I notice from receiving this medicine?  Side effects that you should report to your doctor or health care professional as soon as possible:  -allergic reactions like skin rash, itching or hives, swelling of the face, lips, or tongue  -changes in vision  -confused, agitated, nervous  -irregular or fast heartbeat  -tremor  -trouble passing urine  -unusual bleeding or bruising  -unusually weak or tired  Side effects that usually do not require medical attention (report to your doctor or health care professional if they continue or are bothersome):  -constipation, diarrhea  -drowsy  -headache  -loss of appetite  -stomach upset, vomiting  -thick mucous  This list may not describe all possible side effects. Call your doctor for medical advice about side effects. You may report side effects to FDA at 9-099-FDA-5136.  Where should I keep my medicine?  Keep out of the reach of children.  Store at room temperature between 15 and 30 degrees C (59 and 86 degrees F). Keep container closed tightly. Throw away any unused medicine after the expiration date.  NOTE: This sheet is a summary. It may not cover all possible information. If you have questions about this medicine, talk to your doctor, pharmacist, or health care provider.  © 2020 [employer]vier/Gold Standard (2009-04-06 17:06:22)  Acetaminophen tablets or caplets  What is this medicine?  ACETAMINOPHEN (a set a JOCELYN nata fen) is a pain reliever. It is used to treat mild pain and fever.  This medicine may be used for other purposes; ask your health care provider or pharmacist if you have questions.  COMMON BRAND NAME(S): Aceta, Actamin, Anacin Aspirin Free, Genapap, Genebs, Mapap, Pain & Fever, Pain and Fever, PAIN RELIEF, PAIN RELIEF Extra Strength, Pain Reliever, Panadol, PHARBETOL, Q-Pap, Q-Pap Extra Strength, Tylenol, Tylenol CrushableTablet, Tylenol Extra Strength, XS No Aspirin, XS Pain Reliever  What should I tell my health care  provider before I take this medicine?  They need to know if you have any of these conditions:  -if you often drink alcohol  -liver disease  -an unusual or allergic reaction to acetaminophen, other medicines, foods, dyes, or preservatives  -pregnant or trying to get pregnant  -breast-feeding  How should I use this medicine?  Take this medicine by mouth with a glass of water. Follow the directions on the package or prescription label. Take your medicine at regular intervals. Do not take your medicine more often than directed.  Talk to your pediatrician regarding the use of this medicine in children. While this drug may be prescribed for children as young as 6 years of age for selected conditions, precautions do apply.  Overdosage: If you think you have taken too much of this medicine contact a poison control center or emergency room at once.  NOTE: This medicine is only for you. Do not share this medicine with others.  What if I miss a dose?  If you miss a dose, take it as soon as you can. If it is almost time for your next dose, take only that dose. Do not take double or extra doses.  What may interact with this medicine?  -alcohol  -imatinib  -isoniazid  -other medicines with acetaminophen  This list may not describe all possible interactions. Give your health care provider a list of all the medicines, herbs, non-prescription drugs, or dietary supplements you use. Also tell them if you smoke, drink alcohol, or use illegal drugs. Some items may interact with your medicine.  What should I watch for while using this medicine?  Tell your doctor or health care professional if the pain lasts more than 10 days (5 days for children), if it gets worse, or if there is a new or different kind of pain. Also, check with your doctor if a fever lasts for more than 3 days.  Do not take other medicines that contain acetaminophen with this medicine. Always read labels carefully. If you have questions, ask your doctor or  pharmacist.  If you take too much acetaminophen get medical help right away. Too much acetaminophen can be very dangerous and cause liver damage. Even if you do not have symptoms, it is important to get help right away.  What side effects may I notice from receiving this medicine?  Side effects that you should report to your doctor or health care professional as soon as possible:  -allergic reactions like skin rash, itching or hives, swelling of the face, lips, or tongue  -breathing problems  -fever or sore throat  -redness, blistering, peeling or loosening of the skin, including inside the mouth  -trouble passing urine or change in the amount of urine  -unusual bleeding or bruising  -unusually weak or tired  -yellowing of the eyes or skin  Side effects that usually do not require medical attention (report to your doctor or health care professional if they continue or are bothersome):  -headache  -nausea, stomach upset  This list may not describe all possible side effects. Call your doctor for medical advice about side effects. You may report side effects to FDA at 1-596-FDA-2300.  Where should I keep my medicine?  Keep out of reach of children.  Store at room temperature between 20 and 25 degrees C (68 and 77 degrees F). Protect from moisture and heat. Throw away any unused medicine after the expiration date.  NOTE: This sheet is a summary. It may not cover all possible information. If you have questions about this medicine, talk to your doctor, pharmacist, or health care provider.  © 2020 [employer]/Gold Standard (2014-08-11 12:54:16)  Blood Transfusion, Adult, Care After  This sheet gives you information about how to care for yourself after your procedure. Your doctor may also give you more specific instructions. If you have problems or questions, contact your doctor.  Follow these instructions at home:    · Take over-the-counter and prescription medicines only as told by your doctor.  · Go back to your normal  activities as told by your doctor.  · Follow instructions from your doctor about how to take care of the area where an IV tube was put into your vein (insertion site). Make sure you:  ? Wash your hands with soap and water before you change your bandage (dressing). If there is no soap and water, use hand .  ? Change your bandage as told by your doctor.  · Check your IV insertion site every day for signs of infection. Check for:  ? More redness, swelling, or pain.  ? More fluid or blood.  ? Warmth.  ? Pus or a bad smell.  Contact a doctor if:  · You have more redness, swelling, or pain around the IV insertion site.  · You have more fluid or blood coming from the IV insertion site.  · Your IV insertion site feels warm to the touch.  · You have pus or a bad smell coming from the IV insertion site.  · Your pee (urine) turns pink, red, or brown.  · You feel weak after doing your normal activities.  Get help right away if:  · You have signs of a serious allergic or body defense (immune) system reaction, including:  ? Itchiness.  ? Hives.  ? Trouble breathing.  ? Anxiety.  ? Pain in your chest or lower back.  ? Fever, flushing, and chills.  ? Fast pulse.  ? Rash.  ? Watery poop (diarrhea).  ? Throwing up (vomiting).  ? Dark pee.  ? Serious headache.  ? Dizziness.  ? Stiff neck.  ? Yellow color in your face or the white parts of your eyes (jaundice).  Summary  · After a blood transfusion, return to your normal activities as told by your doctor.  · Every day, check for signs of infection where the IV tube was put into your vein.  · Some signs of infection are warm skin, more redness and pain, more fluid or blood, and pus or a bad smell where the needle went in.  · Contact your doctor if you feel weak or have any unusual symptoms.  This information is not intended to replace advice given to you by your health care provider. Make sure you discuss any questions you have with your health care provider.  Document  Released: 01/08/2016 Document Revised: 08/11/2017 Document Reviewed: 08/11/2017  Elsevier Interactive Patient Education © 2020 [employer]

## 2020-05-16 LAB
BH BB BLOOD EXPIRATION DATE: NORMAL
BH BB BLOOD TYPE BARCODE: 7300
BH BB DISPENSE STATUS: NORMAL
BH BB PRODUCT CODE: NORMAL
BH BB UNIT NUMBER: NORMAL
CROSSMATCH INTERPRETATION: NORMAL
UNIT  ABO: NORMAL
UNIT  RH: NORMAL

## 2020-05-21 ENCOUNTER — OFFICE VISIT (OUTPATIENT)
Dept: CARDIOLOGY | Facility: CLINIC | Age: 80
End: 2020-05-21

## 2020-05-21 VITALS
WEIGHT: 182 LBS | BODY MASS INDEX: 34.36 KG/M2 | TEMPERATURE: 97.1 F | DIASTOLIC BLOOD PRESSURE: 60 MMHG | SYSTOLIC BLOOD PRESSURE: 118 MMHG | OXYGEN SATURATION: 92 % | HEIGHT: 61 IN

## 2020-05-21 DIAGNOSIS — I48.91 ATRIAL FIBRILLATION, UNSPECIFIED TYPE (HCC): Primary | ICD-10-CM

## 2020-05-21 DIAGNOSIS — I42.0 DILATED CARDIOMYOPATHY (HCC): ICD-10-CM

## 2020-05-21 DIAGNOSIS — I27.20 PULMONARY HTN (HCC): ICD-10-CM

## 2020-05-21 DIAGNOSIS — R06.02 SHORTNESS OF BREATH: ICD-10-CM

## 2020-05-21 PROCEDURE — 93000 ELECTROCARDIOGRAM COMPLETE: CPT | Performed by: PHYSICIAN ASSISTANT

## 2020-05-21 PROCEDURE — 99213 OFFICE O/P EST LOW 20 MIN: CPT | Performed by: PHYSICIAN ASSISTANT

## 2020-05-21 RX ORDER — HYDROCODONE BITARTRATE AND ACETAMINOPHEN 7.5; 325 MG/1; MG/1
1 TABLET ORAL 3 TIMES DAILY PRN
COMMUNITY
Start: 2020-05-14

## 2020-05-21 NOTE — PATIENT INSTRUCTIONS
"Fat and Cholesterol Restricted Eating Plan  Getting too much fat and cholesterol in your diet may cause health problems. Choosing the right foods helps keep your fat and cholesterol at normal levels. This can keep you from getting certain diseases.  Your doctor may recommend an eating plan that includes:  · Total fat: ______% or less of total calories a day.  · Saturated fat: ______% or less of total calories a day.  · Cholesterol: less than _________mg a day.  · Fiber: ______g a day.  What are tips for following this plan?  Meal planning  · At meals, divide your plate into four equal parts:  ? Fill one-half of your plate with vegetables and green salads.  ? Fill one-fourth of your plate with whole grains.  ? Fill one-fourth of your plate with low-fat (lean) protein foods.  · Eat fish that is high in omega-3 fats at least two times a week. This includes mackerel, tuna, sardines, and salmon.  · Eat foods that are high in fiber, such as whole grains, beans, apples, broccoli, carrots, peas, and barley.  General tips    · Work with your doctor to lose weight if you need to.  · Avoid:  ? Foods with added sugar.  ? Fried foods.  ? Foods with partially hydrogenated oils.  · Limit alcohol intake to no more than 1 drink a day for nonpregnant women and 2 drinks a day for men. One drink equals 12 oz of beer, 5 oz of wine, or 1½ oz of hard liquor.  Reading food labels  · Check food labels for:  ? Trans fats.  ? Partially hydrogenated oils.  ? Saturated fat (g) in each serving.  ? Cholesterol (mg) in each serving.  ? Fiber (g) in each serving.  · Choose foods with healthy fats, such as:  ? Monounsaturated fats.  ? Polyunsaturated fats.  ? Omega-3 fats.  · Choose grain products that have whole grains. Look for the word \"whole\" as the first word in the ingredient list.  Cooking  · Cook foods using low-fat methods. These include baking, boiling, grilling, and broiling.  · Eat more home-cooked foods. Eat at restaurants and buffets " less often.  · Avoid cooking using saturated fats, such as butter, cream, palm oil, palm kernel oil, and coconut oil.  Recommended foods    Fruits  · All fresh, canned (in natural juice), or frozen fruits.  Vegetables  · Fresh or frozen vegetables (raw, steamed, roasted, or grilled). Green salads.  Grains  · Whole grains, such as whole wheat or whole grain breads, crackers, cereals, and pasta. Unsweetened oatmeal, bulgur, barley, quinoa, or brown rice. Corn or whole wheat flour tortillas.  Meats and other protein foods  · Ground beef (85% or leaner), grass-fed beef, or beef trimmed of fat. Skinless chicken or turkey. Ground chicken or turkey. Pork trimmed of fat. All fish and seafood. Egg whites. Dried beans, peas, or lentils. Unsalted nuts or seeds. Unsalted canned beans. Nut butters without added sugar or oil.  Dairy  · Low-fat or nonfat dairy products, such as skim or 1% milk, 2% or reduced-fat cheeses, low-fat and fat-free ricotta or cottage cheese, or plain low-fat and nonfat yogurt.  Fats and oils  · Tub margarine without trans fats. Light or reduced-fat mayonnaise and salad dressings. Avocado. Olive, canola, sesame, or safflower oils.  The items listed above may not be a complete list of foods and beverages you can eat. Contact a dietitian for more information.  Foods to avoid  Fruits  · Canned fruit in heavy syrup. Fruit in cream or butter sauce. Fried fruit.  Vegetables  · Vegetables cooked in cheese, cream, or butter sauce. Fried vegetables.  Grains  · White bread. White pasta. White rice. Cornbread. Bagels, pastries, and croissants. Crackers and snack foods that contain trans fat and hydrogenated oils.  Meats and other protein foods  · Fatty cuts of meat. Ribs, chicken wings, simpson, sausage, bologna, salami, chitterlings, fatback, hot dogs, bratwurst, and packaged lunch meats. Liver and organ meats. Whole eggs and egg yolks. Chicken and turkey with skin. Fried meat.  Dairy  · Whole or 2% milk, cream,  half-and-half, and cream cheese. Whole milk cheeses. Whole-fat or sweetened yogurt. Full-fat cheeses. Nondairy creamers and whipped toppings. Processed cheese, cheese spreads, and cheese curds.  Beverages  · Alcohol. Sugar-sweetened drinks such as sodas, lemonade, and fruit drinks.  Fats and oils  · Butter, stick margarine, lard, shortening, ghee, or simpson fat. Coconut, palm kernel, and palm oils.  Sweets and desserts  · Corn syrup, sugars, honey, and molasses. Candy. Jam and jelly. Syrup. Sweetened cereals. Cookies, pies, cakes, donuts, muffins, and ice cream.  The items listed above may not be a complete list of foods and beverages you should avoid. Contact a dietitian for more information.  Summary  · Choosing the right foods helps keep your fat and cholesterol at normal levels. This can keep you from getting certain diseases.  · At meals, fill one-half of your plate with vegetables and green salads.  · Eat high-fiber foods, like whole grains, beans, apples, carrots, peas, and barley.  · Limit added sugar, saturated fats, alcohol, and fried foods.  This information is not intended to replace advice given to you by your health care provider. Make sure you discuss any questions you have with your health care provider.  Document Released: 06/18/2013 Document Revised: 08/21/2019 Document Reviewed: 09/04/2018  Elsevier Patient Education © 2020 [employer]  BMI for Adults    Body mass index (BMI) is a number that is calculated from a person's weight and height. BMI may help to estimate how much of a person's weight is composed of fat. BMI can help identify those who may be at higher risk for certain medical problems.  How is BMI used with adults?  BMI is used as a screening tool to identify possible weight problems. It is used to check whether a person is obese, overweight, healthy weight, or underweight.  How is BMI calculated?  BMI measures your weight and compares it to your height. This can be done either in  "English ([place] or metric measurements. Note that charts are available to help you find your BMI quickly and easily without having to do these calculations yourself.  To calculate your BMI in English ([place] measurements, your health care provider will:  1. Measure your weight in pounds (lb).  2. Multiply the number of pounds by 703.  ? For example, for a person who weighs 180 lb, multiply that number by 703, which equals 126,540.  3. Measure your height in inches (in). Then multiply that number by itself to get a measurement called \"inches squared.\"  ? For example, for a person who is 70 in tall, the \"inches squared\" measurement is 70 in x 70 in, which equals 4900 inches squared.  4. Divide the total from Step 2 (number of lb x 703) by the total from Step 3 (inches squared): 126,540 ÷ 4900 = 25.8. This is your BMI.  To calculate your BMI in metric measurements, your health care provider will:  1. Measure your weight in kilograms (kg).  2. Measure your height in meters (m). Then multiply that number by itself to get a measurement called \"meters squared.\"  ? For example, for a person who is 1.75 m tall, the \"meters squared\" measurement is 1.75 m x 1.75 m, which is equal to 3.1 meters squared.  3. Divide the number of kilograms (your weight) by the meters squared number. In this example: 70 ÷ 3.1 = 22.6. This is your BMI.  How is BMI interpreted?  To interpret your results, your health care provider will use BMI charts to identify whether you are underweight, normal weight, overweight, or obese. The following guidelines will be used:  · Underweight: BMI less than 18.5.  · Normal weight: BMI between 18.5 and 24.9.  · Overweight: BMI between 25 and 29.9.  · Obese: BMI of 30 and above.  Please note:  · Weight includes both fat and muscle, so someone with a muscular build, such as an athlete, may have a BMI that is higher than 24.9. In cases like these, BMI is not an accurate measure of body fat.  · To determine if excess " body fat is the cause of a BMI of 25 or higher, further assessments may need to be done by a health care provider.  · BMI is usually interpreted in the same way for men and women.  Why is BMI a useful tool?  BMI is useful in two ways:  · Identifying a weight problem that may be related to a medical condition, or that may increase the risk for medical problems.  · Promoting lifestyle and diet changes in order to reach a healthy weight.  Summary  · Body mass index (BMI) is a number that is calculated from a person's weight and height.  · BMI may help to estimate how much of a person's weight is composed of fat. BMI can help identify those who may be at higher risk for certain medical problems.  · BMI can be measured using English measurements or metric measurements.  · To interpret your results, your health care provider will use BMI charts to identify whether you are underweight, normal weight, overweight, or obese.  This information is not intended to replace advice given to you by your health care provider. Make sure you discuss any questions you have with your health care provider.  Document Released: 08/29/2005 Document Revised: 11/30/2018 Document Reviewed: 10/31/2018  Elsevier Patient Education © 2020 [employer]

## 2020-05-21 NOTE — PROGRESS NOTES
Problem list     Subjective   Cari Harper is a 80 y.o. female     Chief Complaint   Patient presents with   • Atrial Fibrillation     Here for a follow up    • Pulmonary hypertension   Problem List:  1. Paroxysmal atrial fibrillation  2.  Mild dilated cardiomyopathy, estimated EF at 45% per recent echocardiogram.  3. Low risk stress test with no evidence of ischemia.  4. Hypertension  5. Mild pulmonary hypertension  6. Dyslipidemia  7. Hypothyroidism  8. First degree AVB.  9.  Pulmonary hypertension, PA systolic pressures at approximately 60 mmHg.    HPI  The patient presents in today for follow-up post recent hospitalization.  She had an extended hospitalization mostly for noncardiac complications.  She did have an echocardiogram during hospitalization which suggested mild decline in systolic function from previous baseline, estimated EF at 45 to 50%.  There was evidence of moderate to severe pulmonary hypertension with PA systolic pressures at approximately 60 mmHg.  Valvular morphologies otherwise were largely stable.  By EKG today, the patient appears to have A. fib.  There is marked artifact at baseline however which would preclude definitive reading of the EKG.  Symptomatically, the patient feels that she is doing very well.  She is in with the help of her son today.  He advises that they do titrate diuretics as needed at home with success.  The patient has had no chest pain of any significance.  She has rather severe dyspnea.  She has no PND or orthopnea.  The patient has stable lower extremity edema.  She is tolerating medications at home.  Is not entirely clear that she has been entirely compliant with flecainide throughout the hospitalization, but she was very much compliant with that at home by family's help.    Current Outpatient Medications on File Prior to Visit   Medication Sig Dispense Refill   • acetaminophen (TYLENOL) 500 MG tablet Take 500 mg by mouth Every 6 (Six) Hours As Needed for Mild Pain  .     • budesonide-formoterol (SYMBICORT) 160-4.5 MCG/ACT inhaler Inhale 2 puffs 2 (Two) Times a Day.     • bumetanide (BUMEX) 2 MG tablet Take 1 tablet by mouth Daily. 90 tablet 3   • clonazePAM (KlonoPIN) 1 MG tablet Take 1 mg by mouth Every Night.     • dronabinol (Marinol) 2.5 MG capsule Take 1 capsule by mouth 2 (Two) Times a Day Before Meals. 60 capsule 0   • flecainide (TAMBOCOR) 50 MG tablet Take 1 tablet by mouth Every 12 (Twelve) Hours. 180 tablet 3   • FLUoxetine (PROzac) 20 MG capsule Take 20 mg by mouth 2 (two) times a day.     • HYDROcodone-acetaminophen (NORCO) 7.5-325 MG per tablet Take 1 tablet by mouth 3 (Three) Times a Day As Needed.     • loratadine (CLARITIN) 10 MG tablet Take 10 mg by mouth Daily.     • O2 (OXYGEN) Inhale 2 L/min Every Night.     • Omega-3 Fatty Acids (FISH OIL) 1200 MG capsule capsule Take 2,400 mg by mouth Every Evening.     • omeprazole (priLOSEC) 20 MG capsule Take 20 mg by mouth 2 (Two) Times a Day.     • Polyethylene Glycol 3350 (CLEARLAX PO) Take  by mouth.     • potassium chloride (K-DUR) 10 MEQ CR tablet Take 1 tablet by mouth Daily. 90 tablet 3   • Prenatal Vit-Fe Fumarate-FA (PRENATAL, CLASSIC, VITAMIN) 28-0.8 MG tablet tablet Take  by mouth Daily.     • Thyroid 60 MG PO tablet Take 60 mg by mouth Every Morning.     • warfarin (COUMADIN) 5 MG tablet Take 5 mg by mouth Daily. Takes 1/2 tablet on Tuesday and Thursday and one tablet all other days     • sucralfate (CARAFATE) 1 g tablet Take 1 g by mouth 2 (Two) Times a Day.       No current facility-administered medications on file prior to visit.        Patient has no known allergies.    Past Medical History:   Diagnosis Date   • Anxiety    • Arthritis    • Asthma    • Atrial fibrillation (CMS/HCC)    • Coronary artery disease    • Depression    • Disease of thyroid gland    • Diverticulitis    • DVT (deep venous thrombosis) (CMS/HCC)    • Eczema    • GERD (gastroesophageal reflux disease)    • HLD (hyperlipidemia)  2018   • Iron deficiency anemia    • On home oxygen therapy     2L at night   • Wears dentures    • Wears glasses        Social History     Socioeconomic History   • Marital status: Unknown     Spouse name: Not on file   • Number of children: Not on file   • Years of education: Not on file   • Highest education level: Not on file   Tobacco Use   • Smoking status: Former Smoker     Packs/day: 1.00     Years: 0.00     Pack years: 0.00     Types: Cigarettes     Start date: 1961     Last attempt to quit: 1986     Years since quittin.8   • Smokeless tobacco: Never Used   Substance and Sexual Activity   • Alcohol use: No   • Drug use: No   • Sexual activity: Defer       Family History   Problem Relation Age of Onset   • Cancer Father    • No Known Problems Mother        Review of Systems   Constitutional: Positive for fatigue (recently in hospital for blood transfusions ). Negative for chills and fever.   HENT: Positive for congestion. Negative for rhinorrhea and sore throat.    Eyes: Positive for visual disturbance (reading glasses ).   Respiratory: Positive for shortness of breath. Negative for chest tightness.         Uses oxygen at 2lpm during the day and 3lpm at night    Cardiovascular: Positive for leg swelling (LE edema ).   Gastrointestinal: Positive for nausea. Negative for abdominal pain, blood in stool and vomiting.   Endocrine: Positive for cold intolerance. Negative for heat intolerance.   Genitourinary: Positive for dysuria. Negative for frequency, hematuria and urgency.   Musculoskeletal: Positive for arthralgias (knees ), back pain (mid to lower back pain ) and gait problem (uses a walker or wheelchair for ambulation ).   Skin: Negative.  Negative for rash and wound.   Allergic/Immunologic: Negative.  Negative for environmental allergies and food allergies.   Neurological: Positive for weakness (generalized weakness ). Negative for dizziness and light-headedness.   Hematological:  "Bruises/bleeds easily.   Psychiatric/Behavioral: Negative.  Negative for agitation, confusion and sleep disturbance (denies waking with smothering ). The patient is not nervous/anxious.        Objective   Vitals:    05/21/20 0938   BP: 118/60   BP Location: Left arm   Patient Position: Sitting   Temp: 97.1 °F (36.2 °C)   SpO2: 92%   Weight: 82.6 kg (182 lb)   Height: 154.9 cm (61\")      /60 (BP Location: Left arm, Patient Position: Sitting)   Temp 97.1 °F (36.2 °C)   Ht 154.9 cm (61\")   Wt 82.6 kg (182 lb) Comment: pt reported that she was weighed this morning at home  SpO2 92%   BMI 34.39 kg/m²    Lab Results (most recent)     None        Physical Exam   Constitutional: She is oriented to person, place, and time. She appears well-developed and well-nourished. No distress.   HENT:   Head: Normocephalic and atraumatic.   Eyes: Pupils are equal, round, and reactive to light. Conjunctivae and EOM are normal.   Neck: Normal range of motion. Neck supple. No JVD present. No tracheal deviation present.   Cardiovascular: Normal rate, normal heart sounds and intact distal pulses. An irregularly irregular rhythm present.   Pulmonary/Chest: Effort normal. She has decreased breath sounds. She has no rales (Minimal bibasilar rales).   Abdominal: Soft. Bowel sounds are normal. She exhibits no distension and no mass. There is no tenderness. There is no rebound and no guarding.   Musculoskeletal: Normal range of motion. She exhibits edema. She exhibits no tenderness or deformity.   Neurological: She is alert and oriented to person, place, and time.   Skin: Skin is warm and dry. No rash noted. No erythema. No pallor.   Psychiatric: She has a normal mood and affect. Her behavior is normal. Judgment and thought content normal.   Nursing note and vitals reviewed.        Procedure     ECG 12 Lead  Date/Time: 5/21/2020 10:16 AM  Performed by: Jeovany Dotson PA  Authorized by: eJovany Dotson PA   Comparison: compared with " previous ECG from 9/12/2019  Comparison to previous ECG: Marked artifact, but the patient does appear to be in A. fib, there is little other useful information that can be gleaned from the study today.                 Assessment/Plan      Diagnosis Plan   1. Atrial fibrillation, unspecified type (CMS/HCC)  ECG 12 Lead   2. Shortness of breath     3. Pulmonary HTN (CMS/HCC)     4. Dilated cardiomyopathy (CMS/HCC)       1.  The patient appears to have A. fib today, all as above.  She has been compliant with flecainide therapy.  I would be hesitant to make changes at this time because compliancy of medications are not entirely known at this time.  There may have been some time during hospitalization that the patient was not receiving flecainide.  In that setting, I would like for her to continue flecainide as dosed for the next couple of weeks and we will repeat an EKG.  We can adjust at that time if needed if A. fib is still noted.  Otherwise, I would continue anticoagulation otherwise as dosed.    2.  Recent echo suggested a slight decline in systolic function and elevation further in pulmonary pressures.  EF was felt to be at 45% and PA pressures up to 60 mmHg.  I will forward her echo findings on to her pulmonologist who will be seeing her for pulmonary hypertension and issues otherwise.  I would be hesitant to address and adjust further cardioprotective therapies at this time because of intermittent hypotension.  In that setting, I really would make no further changes for now.  Again we can reevaluate when we see her in 2 to 3 weeks for follow-up of A. fib at that time as well.    3.  For any issues whatsoever, the patient will call immediately.  For now, as above, we will continue medical regimen.  We will be following her very closely and will adjust and modify further as needed and as suggested by clinical course.           Cari Harper  reports that she quit smoking about 33 years ago. Her smoking use  included cigarettes. She started smoking about 58 years ago. She smoked 1.00 pack per day for 0.00 years. She has never used smokeless tobacco..           Patient's Body mass index is 34.39 kg/m². BMI is above normal parameters. Recommendations include: educational material and referral to primary care.             Electronically signed by:

## 2025-05-27 NOTE — ED NOTES
Attempted to reach pt, left vm to call office back.  [place] Risk bracelet placed on patient's left arm.      Jess Higgins, RN  10/04/18 0275

## (undated) DEVICE — T-MAX DISPOSABLE FACE MASK 8 PER BOX

## (undated) DEVICE — SYR LUERLOK 50ML

## (undated) DEVICE — NDL SPINE 18G 31/2IN PNK

## (undated) DEVICE — GLV SURG DERMASSURE GRN LF PF 7.0

## (undated) DEVICE — PROXIMATE RH ROTATING HEAD SKIN STAPLERS (35 WIDE) CONTAINS 35 STAINLESS STEEL STAPLES: Brand: PROXIMATE

## (undated) DEVICE — GLV SURG TRIUMPH LT PF LTX 8 STRL

## (undated) DEVICE — GLV SURG SIGNATURE TOUCH PF LTX 8 STRL BX/50

## (undated) DEVICE — GLV SURG TRIUMPH ORTHO W/ALOE PF LTX 7.0

## (undated) DEVICE — PK MAJ SHLDR SPLT 10

## (undated) DEVICE — T4 PULLOVER TOGA, LARGE

## (undated) DEVICE — SYS SKIN CLS DERMABOND PRINEO W/22CM MESH TP

## (undated) DEVICE — ANTIBACTERIAL UNDYED BRAIDED (POLYGLACTIN 910), SYNTHETIC ABSORBABLE SUTURE: Brand: COATED VICRYL

## (undated) DEVICE — NDL HYPO ECLPS SFTY 18G 1 1/2IN

## (undated) DEVICE — CVR HNDL LT SURG ACCSSRY BLU STRL

## (undated) DEVICE — NDL HYPO ECLPS SFTY 25G 1 1/2IN

## (undated) DEVICE — DRSNG WND GZ PAD BORDERED 4X8IN STRL

## (undated) DEVICE — STRYKER PERFORMANCE SERIES SAGITTAL BLADE: Brand: STRYKER PERFORMANCE SERIES

## (undated) DEVICE — ST PIN FIX TEMP UNIVERS REVERS W/OSTEO/GUIDE/PIN 2.4MM STRL

## (undated) DEVICE — SHOULDER STABILIZATION KIT,                                    DISPOSABLE 12 PER BOX